# Patient Record
Sex: FEMALE | Race: WHITE | Employment: OTHER | URBAN - NONMETROPOLITAN AREA
[De-identification: names, ages, dates, MRNs, and addresses within clinical notes are randomized per-mention and may not be internally consistent; named-entity substitution may affect disease eponyms.]

---

## 2019-04-09 LAB
CREATININE: 1.4 MG/DL
POTASSIUM (K+): 3.8

## 2019-04-19 VITALS
BODY MASS INDEX: 34.87 KG/M2 | TEMPERATURE: 96.9 F | DIASTOLIC BLOOD PRESSURE: 80 MMHG | WEIGHT: 155 LBS | SYSTOLIC BLOOD PRESSURE: 120 MMHG | HEART RATE: 56 BPM | HEIGHT: 56 IN

## 2019-04-19 RX ORDER — FUROSEMIDE 40 MG/1
40 TABLET ORAL DAILY
COMMUNITY
End: 2021-01-12

## 2019-05-09 ENCOUNTER — OFFICE VISIT (OUTPATIENT)
Dept: FAMILY MEDICINE CLINIC | Age: 79
End: 2019-05-09
Payer: MEDICARE

## 2019-05-09 VITALS
HEIGHT: 58 IN | HEART RATE: 98 BPM | OXYGEN SATURATION: 96 % | SYSTOLIC BLOOD PRESSURE: 120 MMHG | WEIGHT: 159 LBS | DIASTOLIC BLOOD PRESSURE: 83 MMHG | RESPIRATION RATE: 15 BRPM | BODY MASS INDEX: 33.37 KG/M2 | TEMPERATURE: 97.2 F

## 2019-05-09 DIAGNOSIS — G20 PARKINSON'S DISEASE (HCC): ICD-10-CM

## 2019-05-09 DIAGNOSIS — R60.9 PERIPHERAL EDEMA: ICD-10-CM

## 2019-05-09 DIAGNOSIS — Z91.81 AT HIGH RISK FOR FALLS: Primary | ICD-10-CM

## 2019-05-09 DIAGNOSIS — N18.30 CKD (CHRONIC KIDNEY DISEASE) STAGE 3, GFR 30-59 ML/MIN (HCC): ICD-10-CM

## 2019-05-09 PROBLEM — R60.0 PERIPHERAL EDEMA: Status: ACTIVE | Noted: 2019-05-09

## 2019-05-09 PROCEDURE — 99213 OFFICE O/P EST LOW 20 MIN: CPT | Performed by: FAMILY MEDICINE

## 2019-05-09 RX ORDER — CARBIDOPA AND LEVODOPA 25; 100 MG/1; MG/1
1 TABLET, EXTENDED RELEASE ORAL
COMMUNITY
Start: 2016-07-31 | End: 2019-05-09 | Stop reason: ALTCHOICE

## 2019-05-09 ASSESSMENT — ENCOUNTER SYMPTOMS
VOMITING: 0
DIARRHEA: 0
NAUSEA: 0
EYE PAIN: 0
ALLERGIC/IMMUNOLOGIC NEGATIVE: 1
ABDOMINAL PAIN: 0
SORE THROAT: 0
SINUS PAIN: 0
BACK PAIN: 0
SHORTNESS OF BREATH: 0
TROUBLE SWALLOWING: 0
EYE DISCHARGE: 0
COUGH: 0
PHOTOPHOBIA: 0
CHEST TIGHTNESS: 0
BLOOD IN STOOL: 0
EYE REDNESS: 0

## 2019-05-09 ASSESSMENT — PATIENT HEALTH QUESTIONNAIRE - PHQ9
SUM OF ALL RESPONSES TO PHQ QUESTIONS 1-9: 0
SUM OF ALL RESPONSES TO PHQ9 QUESTIONS 1 & 2: 0
SUM OF ALL RESPONSES TO PHQ QUESTIONS 1-9: 0
1. LITTLE INTEREST OR PLEASURE IN DOING THINGS: 0
2. FEELING DOWN, DEPRESSED OR HOPELESS: 0

## 2019-05-09 NOTE — PROGRESS NOTES
well-nourished. HENT:   Head: Atraumatic. Right Ear: External ear normal.   Left Ear: External ear normal.   Nose: Nose normal.   Mouth/Throat: Oropharynx is clear and moist. No oropharyngeal exudate. Eyes: Pupils are equal, round, and reactive to light. Conjunctivae and EOM are normal.   Neck: Normal range of motion. Neck supple. No tracheal deviation present. No thyromegaly present. Cardiovascular: Normal rate, regular rhythm and intact distal pulses. Exam reveals no gallop and no friction rub. No murmur heard. Pulmonary/Chest: Effort normal and breath sounds normal. No respiratory distress. Abdominal: Soft. Bowel sounds are normal.   Musculoskeletal: Normal range of motion. She exhibits edema and tenderness. She exhibits no deformity. Edema, no skin breakdown   Lymphadenopathy:     She has no cervical adenopathy. Neurological: She is alert and oriented to person, place, and time. She displays normal reflexes. No sensory deficit. She exhibits normal muscle tone. Coordination normal.   Skin: Skin is warm and dry. Capillary refill takes less than 2 seconds. No rash noted. Psychiatric: She has a normal mood and affect. Assessment and Plan:  Allie Bates was seen today for edema. Diagnoses and all orders for this visit:    At high risk for falls    Parkinson's disease ([address]  Stable with current dose Sinemet  Peripheral edema    CKD (chronic kidney disease) stage 3, GFR 30-59 ml/min ([facility])    Will recheck BMP 1 month, Lasix daily restarted    Return in about 1 month (around 6/6/2019). Seen By:  Danae Holly DO    On the basis of positive falls risk screening, assessment and plan is as follows: patient declines any further evaluation/treatment for increased falls risk, she isaware of her status.

## 2019-06-10 PROBLEM — N81.9 FEMALE GENITAL PROLAPSE: Status: ACTIVE | Noted: 2017-08-07

## 2020-06-25 ENCOUNTER — OFFICE VISIT (OUTPATIENT)
Dept: FAMILY MEDICINE CLINIC | Age: 80
End: 2020-06-25
Payer: MEDICARE

## 2020-06-25 VITALS — HEART RATE: 85 BPM | HEIGHT: 58 IN | WEIGHT: 159 LBS | OXYGEN SATURATION: 95 % | BODY MASS INDEX: 33.37 KG/M2

## 2020-06-25 PROCEDURE — 99213 OFFICE O/P EST LOW 20 MIN: CPT | Performed by: NURSE PRACTITIONER

## 2020-06-25 NOTE — PROGRESS NOTES
Chief Complaint:   Clavicle Injury (Lt side / [employer] / Less than 1 hr ago)      History of Present Illness   HPI:  Patient presents today for clavicle injury. Patient states her main Oren slid down and landed on her left collarbone and on her left cheek. This happened approximately 1 hour prior to arrival.  She has taken nothing for the pain. She does have full range of motion with her left arm. She is not having any significant pain at this time. Patient and daughter states they just want to make sure nothing is wrong. Prior to Visit Medications    Medication Sig Taking? Authorizing Provider   [facility]ic[facility] Does not apply route Yes Historical Provider, MD   carbidopa-levodopa (SINEMET)  MG per tablet  Yes Historical Provider, MD   furosemide (LASIX) 40 MG tablet Take 40 mg by mouth daily Yes Historical Provider, MD   ropinirole (REQUIP) 1 MG tablet Take 12 mg by mouth Daily with lunch. Yes Historical Provider, MD   nortriptyline (PAMELOR) 25 MG capsule Take 50 mg by mouth nightly. Yes Historical Provider, MD   lisinopril (PRINIVIL;ZESTRIL) 10 MG tablet Take 10 mg by mouth daily. Yes Historical Provider, MD       No Known Allergies      Review of Systems   Review of Systems   Constitutional: Negative for activity change, chills and fever. HENT: Negative for congestion. Respiratory: Negative for cough, chest tightness, shortness of breath and wheezing. Cardiovascular: Negative for chest pain, palpitations and leg swelling. Gastrointestinal: Negative for abdominal pain, constipation, diarrhea, nausea and vomiting. Genitourinary: Negative for dysuria and urgency. Musculoskeletal: Positive for arthralgias. Negative for myalgias and neck pain. Neurological: Negative for dizziness, weakness, light-headedness and numbness. Psychiatric/Behavioral: The patient is not nervous/anxious.           VS:  Pulse 85   Ht 4' 10\" (1.473 m)   Wt 159 lb (72.1 kg)   SpO2 95%   BMI 33.23 kg/m²     Patient's medical, social, and family history reviewed      Physical Exam   Physical Exam  Vitals signs reviewed. Constitutional:       General: She is not in acute distress. Appearance: Normal appearance. She is well-developed. She is not toxic-appearing. HENT:      Head: Normocephalic and atraumatic. Right Ear: Hearing normal.      Left Ear: Hearing normal.      Nose: Nose normal.      Mouth/Throat:      Lips: Pink. Mouth: Mucous membranes are moist.      Pharynx: Oropharynx is clear. Uvula midline. Eyes:      General: Lids are normal.      Conjunctiva/sclera: Conjunctivae normal.      Pupils: Pupils are equal, round, and reactive to light. Neck:      Musculoskeletal: Normal range of motion. Trachea: Trachea normal.   Cardiovascular:      Rate and Rhythm: Normal rate and regular rhythm. Pulses: Normal pulses. Radial pulses are 2+ on the right side and 2+ on the left side. Heart sounds: Normal heart sounds. Pulmonary:      Effort: Pulmonary effort is normal.      Breath sounds: Normal breath sounds. Abdominal:      General: Abdomen is flat. Bowel sounds are normal.      Palpations: Abdomen is soft. Musculoskeletal:      Left shoulder: She exhibits normal range of motion, no swelling, no effusion, no crepitus, no deformity, no pain and normal strength. Lymphadenopathy:      Cervical: No cervical adenopathy. Skin:     General: Skin is warm and dry. Capillary Refill: Capillary refill takes less than 2 seconds. Neurological:      Mental Status: She is alert and oriented to person, place, and time. Psychiatric:         Attention and Perception: Attention normal.         Mood and Affect: Mood and affect normal.         Speech: Speech normal.         Behavior: Behavior normal. Behavior is cooperative. Thought Content:  Thought content normal.         Cognition and Memory: Cognition normal.         Judgment: Judgment normal.

## 2020-06-26 ASSESSMENT — ENCOUNTER SYMPTOMS
CHEST TIGHTNESS: 0
COUGH: 0
DIARRHEA: 0
CONSTIPATION: 0
SHORTNESS OF BREATH: 0
NAUSEA: 0
WHEEZING: 0
ABDOMINAL PAIN: 0
VOMITING: 0

## 2021-01-12 ENCOUNTER — VIRTUAL VISIT (OUTPATIENT)
Dept: FAMILY MEDICINE CLINIC | Age: 81
End: 2021-01-12
Payer: MEDICARE

## 2021-01-12 DIAGNOSIS — I10 ESSENTIAL HYPERTENSION: Primary | ICD-10-CM

## 2021-01-12 DIAGNOSIS — E78.2 MIXED HYPERLIPIDEMIA: ICD-10-CM

## 2021-01-12 DIAGNOSIS — G20 PARKINSON DISEASE (HCC): ICD-10-CM

## 2021-01-12 PROCEDURE — 99213 OFFICE O/P EST LOW 20 MIN: CPT | Performed by: FAMILY MEDICINE

## 2021-01-12 NOTE — PROGRESS NOTES
2021    TELEHEALTH EVALUATION -- Audio/Visual (During UAACW-59 public health emergency)    HPI: Recheck HTN    Cristal Talamantes (:  1940) has requested an audio/video evaluation for the following concern(s):    Denies any new complaints. Tolerating medications well. She continues to have good response    Review of Systems    Prior to Visit Medications    Medication Sig Taking? Authorizing Provider   Handicap Placard [facility] by Does not apply route Yes Historical Provider, MD   carbidopa-levodopa (SINEMET)  MG per tablet  Yes Historical Provider, MD   ropinirole (REQUIP) 1 MG tablet Take 12 mg by mouth Daily with lunch. Yes Historical Provider, MD   nortriptyline (PAMELOR) 10 MG capsule Take 10 mg by mouth nightly  Yes Historical Provider, MD       Social History     Tobacco Use    Smoking status: Never Smoker    Smokeless tobacco: Never Used   Substance Use Topics    Alcohol use: Not Currently    Drug use: No            PHYSICAL EXAMINATION:  [ INSTRUCTIONS:  \"[x]\" Indicates a positive item  \"[]\" Indicates a negative item  -- DELETE ALL ITEMS NOT EXAMINED]  Vital Signs: Per vitals screen    Constitutional: [x] Appears well-developed and well-nourished [x] No apparent distress      [] Abnormal-   Mental status  [x] Alert and awake  [x] Oriented to person/place/time [x]Able to follow commands      Eyes:  EOM    [x]  Normal  [] Abnormal-  Sclera  [x]  Normal  [] Abnormal -         Discharge [x]  None visible  [] Abnormal -    HENT:   [x] Normocephalic, atraumatic.   [] Abnormal   [x] Mouth/Throat: Mucous membranes are moist.     External Ears [x] Normal  [] Abnormal-     Neck: [x] No visualized mass     Pulmonary/Chest: [x] Respiratory effort normal.  [x] No visualized signs of difficulty breathing or respiratory distress        [] Abnormal-      Musculoskeletal:   [] Normal gait with no signs of ataxia         [x] Normal range of motion of neck        [] Abnormal-       Neurological:        [x] No provider were located at their individual homes. --Jigna Humphrey DO on 1/12/2021 at 11:48 AM    An electronic signature was used to authenticate this note.

## 2021-09-09 ENCOUNTER — HOSPITAL ENCOUNTER (INPATIENT)
Age: 81
LOS: 3 days | Discharge: HOME HEALTH | DRG: 689 | End: 2021-09-13
Attending: STUDENT IN AN ORGANIZED HEALTH CARE EDUCATION/TRAINING PROGRAM | Admitting: INTERNAL MEDICINE
Payer: MEDICARE

## 2021-09-09 ENCOUNTER — APPOINTMENT (OUTPATIENT)
Dept: GENERAL RADIOLOGY | Age: 81
DRG: 689 | End: 2021-09-09
Payer: MEDICARE

## 2021-09-09 ENCOUNTER — APPOINTMENT (OUTPATIENT)
Dept: CT IMAGING | Age: 81
DRG: 689 | End: 2021-09-09
Payer: MEDICARE

## 2021-09-09 DIAGNOSIS — E86.0 DEHYDRATION: ICD-10-CM

## 2021-09-09 DIAGNOSIS — N30.00 ACUTE CYSTITIS WITHOUT HEMATURIA: Primary | ICD-10-CM

## 2021-09-09 DIAGNOSIS — R41.82 ALTERED MENTAL STATUS, UNSPECIFIED ALTERED MENTAL STATUS TYPE: ICD-10-CM

## 2021-09-09 PROBLEM — R60.9 PERIPHERAL EDEMA: Status: RESOLVED | Noted: 2019-05-09 | Resolved: 2021-09-09

## 2021-09-09 PROBLEM — N39.0 UTI (URINARY TRACT INFECTION): Status: ACTIVE | Noted: 2021-09-09

## 2021-09-09 PROBLEM — R60.0 PERIPHERAL EDEMA: Status: RESOLVED | Noted: 2019-05-09 | Resolved: 2021-09-09

## 2021-09-09 PROBLEM — N81.9 FEMALE GENITAL PROLAPSE: Status: RESOLVED | Noted: 2017-08-07 | Resolved: 2021-09-09

## 2021-09-09 LAB
ALBUMIN SERPL-MCNC: 3.8 G/DL (ref 3.5–5.2)
ALP BLD-CCNC: 81 U/L (ref 35–104)
ALT SERPL-CCNC: 14 U/L (ref 0–32)
AMORPHOUS: ABNORMAL
ANION GAP SERPL CALCULATED.3IONS-SCNC: 16 MMOL/L (ref 7–16)
AST SERPL-CCNC: 36 U/L (ref 0–31)
BACTERIA: ABNORMAL /HPF
BASOPHILS ABSOLUTE: 0.07 E9/L (ref 0–0.2)
BASOPHILS RELATIVE PERCENT: 0.6 % (ref 0–2)
BILIRUB SERPL-MCNC: 1.3 MG/DL (ref 0–1.2)
BILIRUBIN URINE: NEGATIVE
BLOOD, URINE: ABNORMAL
BUN BLDV-MCNC: 25 MG/DL (ref 6–23)
CALCIUM SERPL-MCNC: 8.9 MG/DL (ref 8.6–10.2)
CHLORIDE BLD-SCNC: 103 MMOL/L (ref 98–107)
CLARITY: CLEAR
CO2: 21 MMOL/L (ref 22–29)
COLOR: YELLOW
CREAT SERPL-MCNC: 1.2 MG/DL (ref 0.5–1)
EKG ATRIAL RATE: 89 BPM
EKG P AXIS: 41 DEGREES
EKG P-R INTERVAL: 150 MS
EKG Q-T INTERVAL: 374 MS
EKG QRS DURATION: 78 MS
EKG QTC CALCULATION (BAZETT): 455 MS
EKG R AXIS: 2 DEGREES
EKG T AXIS: 39 DEGREES
EKG VENTRICULAR RATE: 89 BPM
EOSINOPHILS ABSOLUTE: 0.13 E9/L (ref 0.05–0.5)
EOSINOPHILS RELATIVE PERCENT: 1.1 % (ref 0–6)
EPITHELIAL CELLS, UA: ABNORMAL /HPF
GFR AFRICAN AMERICAN: 52
GFR NON-AFRICAN AMERICAN: 43 ML/MIN/1.73
GLUCOSE BLD-MCNC: 75 MG/DL (ref 74–99)
GLUCOSE URINE: NEGATIVE MG/DL
HCT VFR BLD CALC: 50.8 % (ref 34–48)
HEMOGLOBIN: 16.5 G/DL (ref 11.5–15.5)
IMMATURE GRANULOCYTES #: 0.06 E9/L
IMMATURE GRANULOCYTES %: 0.5 % (ref 0–5)
KETONES, URINE: 40 MG/DL
LACTIC ACID, SEPSIS: 1.7 MMOL/L (ref 0.5–1.9)
LEUKOCYTE ESTERASE, URINE: ABNORMAL
LIPASE: 19 U/L (ref 13–60)
LYMPHOCYTES ABSOLUTE: 1.1 E9/L (ref 1.5–4)
LYMPHOCYTES RELATIVE PERCENT: 9.7 % (ref 20–42)
MAGNESIUM: 2.1 MG/DL (ref 1.6–2.6)
MCH RBC QN AUTO: 31.8 PG (ref 26–35)
MCHC RBC AUTO-ENTMCNC: 32.5 % (ref 32–34.5)
MCV RBC AUTO: 97.9 FL (ref 80–99.9)
MONOCYTES ABSOLUTE: 1.14 E9/L (ref 0.1–0.95)
MONOCYTES RELATIVE PERCENT: 10 % (ref 2–12)
NEUTROPHILS ABSOLUTE: 8.86 E9/L (ref 1.8–7.3)
NEUTROPHILS RELATIVE PERCENT: 78.1 % (ref 43–80)
NITRITE, URINE: POSITIVE
PDW BLD-RTO: 13 FL (ref 11.5–15)
PH UA: 5.5 (ref 5–9)
PLATELET # BLD: 313 E9/L (ref 130–450)
PMV BLD AUTO: 10.3 FL (ref 7–12)
POTASSIUM REFLEX MAGNESIUM: 3.4 MMOL/L (ref 3.5–5)
PROTEIN UA: ABNORMAL MG/DL
RBC # BLD: 5.19 E12/L (ref 3.5–5.5)
RBC UA: ABNORMAL /HPF (ref 0–2)
SODIUM BLD-SCNC: 140 MMOL/L (ref 132–146)
SPECIFIC GRAVITY UA: >=1.03 (ref 1–1.03)
TOTAL PROTEIN: 6.8 G/DL (ref 6.4–8.3)
TROPONIN, HIGH SENSITIVITY: 24 NG/L (ref 0–9)
UROBILINOGEN, URINE: 0.2 E.U./DL
WBC # BLD: 11.4 E9/L (ref 4.5–11.5)
WBC UA: ABNORMAL /HPF (ref 0–5)

## 2021-09-09 PROCEDURE — 71045 X-RAY EXAM CHEST 1 VIEW: CPT

## 2021-09-09 PROCEDURE — 84484 ASSAY OF TROPONIN QUANT: CPT

## 2021-09-09 PROCEDURE — 2580000003 HC RX 258: Performed by: STUDENT IN AN ORGANIZED HEALTH CARE EDUCATION/TRAINING PROGRAM

## 2021-09-09 PROCEDURE — 80053 COMPREHEN METABOLIC PANEL: CPT

## 2021-09-09 PROCEDURE — 81001 URINALYSIS AUTO W/SCOPE: CPT

## 2021-09-09 PROCEDURE — 87088 URINE BACTERIA CULTURE: CPT

## 2021-09-09 PROCEDURE — 99285 EMERGENCY DEPT VISIT HI MDM: CPT

## 2021-09-09 PROCEDURE — 96374 THER/PROPH/DIAG INJ IV PUSH: CPT

## 2021-09-09 PROCEDURE — 93005 ELECTROCARDIOGRAM TRACING: CPT | Performed by: STUDENT IN AN ORGANIZED HEALTH CARE EDUCATION/TRAINING PROGRAM

## 2021-09-09 PROCEDURE — 6360000002 HC RX W HCPCS: Performed by: STUDENT IN AN ORGANIZED HEALTH CARE EDUCATION/TRAINING PROGRAM

## 2021-09-09 PROCEDURE — 83605 ASSAY OF LACTIC ACID: CPT

## 2021-09-09 PROCEDURE — 87040 BLOOD CULTURE FOR BACTERIA: CPT

## 2021-09-09 PROCEDURE — 83735 ASSAY OF MAGNESIUM: CPT

## 2021-09-09 PROCEDURE — 83690 ASSAY OF LIPASE: CPT

## 2021-09-09 PROCEDURE — 96360 HYDRATION IV INFUSION INIT: CPT

## 2021-09-09 PROCEDURE — 96361 HYDRATE IV INFUSION ADD-ON: CPT

## 2021-09-09 PROCEDURE — G0378 HOSPITAL OBSERVATION PER HR: HCPCS

## 2021-09-09 PROCEDURE — 70450 CT HEAD/BRAIN W/O DYE: CPT

## 2021-09-09 PROCEDURE — 85025 COMPLETE CBC W/AUTO DIFF WBC: CPT

## 2021-09-09 PROCEDURE — 87186 SC STD MICRODIL/AGAR DIL: CPT

## 2021-09-09 RX ORDER — 0.9 % SODIUM CHLORIDE 0.9 %
1000 INTRAVENOUS SOLUTION INTRAVENOUS ONCE
Status: COMPLETED | OUTPATIENT
Start: 2021-09-09 | End: 2021-09-09

## 2021-09-09 RX ORDER — CEFTRIAXONE 2 G/1
INJECTION, POWDER, FOR SOLUTION INTRAMUSCULAR; INTRAVENOUS
Status: DISPENSED
Start: 2021-09-09 | End: 2021-09-10

## 2021-09-09 RX ADMIN — SODIUM CHLORIDE 1000 ML: 9 INJECTION, SOLUTION INTRAVENOUS at 21:03

## 2021-09-09 RX ADMIN — WATER 2000 MG: 1 INJECTION INTRAMUSCULAR; INTRAVENOUS; SUBCUTANEOUS at 22:27

## 2021-09-09 NOTE — ED NOTES
Bed: 25  Expected date:   Expected time:   Means of arrival:   Comments:  ems     Pattie Dang RN  09/09/21 1942

## 2021-09-10 LAB
ALBUMIN SERPL-MCNC: 3.2 G/DL (ref 3.5–5.2)
ALP BLD-CCNC: 67 U/L (ref 35–104)
ALT SERPL-CCNC: 15 U/L (ref 0–32)
ANION GAP SERPL CALCULATED.3IONS-SCNC: 9 MMOL/L (ref 7–16)
AST SERPL-CCNC: 48 U/L (ref 0–31)
BASOPHILS ABSOLUTE: 0.07 E9/L (ref 0–0.2)
BASOPHILS RELATIVE PERCENT: 0.8 % (ref 0–2)
BILIRUB SERPL-MCNC: 1 MG/DL (ref 0–1.2)
BUN BLDV-MCNC: 22 MG/DL (ref 6–23)
CALCIUM SERPL-MCNC: 8.3 MG/DL (ref 8.6–10.2)
CHLORIDE BLD-SCNC: 109 MMOL/L (ref 98–107)
CO2: 21 MMOL/L (ref 22–29)
CREAT SERPL-MCNC: 0.9 MG/DL (ref 0.5–1)
EKG ATRIAL RATE: 89 BPM
EKG P AXIS: 41 DEGREES
EKG P-R INTERVAL: 150 MS
EKG Q-T INTERVAL: 374 MS
EKG QRS DURATION: 78 MS
EKG QTC CALCULATION (BAZETT): 455 MS
EKG R AXIS: 2 DEGREES
EKG T AXIS: 39 DEGREES
EKG VENTRICULAR RATE: 89 BPM
EOSINOPHILS ABSOLUTE: 0.37 E9/L (ref 0.05–0.5)
EOSINOPHILS RELATIVE PERCENT: 4 % (ref 0–6)
GFR AFRICAN AMERICAN: >60
GFR NON-AFRICAN AMERICAN: >60 ML/MIN/1.73
GLUCOSE BLD-MCNC: 88 MG/DL (ref 74–99)
HCT VFR BLD CALC: 43.9 % (ref 34–48)
HEMOGLOBIN: 14.3 G/DL (ref 11.5–15.5)
IMMATURE GRANULOCYTES #: 0.05 E9/L
IMMATURE GRANULOCYTES %: 0.5 % (ref 0–5)
LYMPHOCYTES ABSOLUTE: 1.27 E9/L (ref 1.5–4)
LYMPHOCYTES RELATIVE PERCENT: 13.6 % (ref 20–42)
MCH RBC QN AUTO: 31.8 PG (ref 26–35)
MCHC RBC AUTO-ENTMCNC: 32.6 % (ref 32–34.5)
MCV RBC AUTO: 97.8 FL (ref 80–99.9)
MONOCYTES ABSOLUTE: 1.13 E9/L (ref 0.1–0.95)
MONOCYTES RELATIVE PERCENT: 12.1 % (ref 2–12)
NEUTROPHILS ABSOLUTE: 6.44 E9/L (ref 1.8–7.3)
NEUTROPHILS RELATIVE PERCENT: 69 % (ref 43–80)
PDW BLD-RTO: 12.9 FL (ref 11.5–15)
PLATELET # BLD: 266 E9/L (ref 130–450)
PMV BLD AUTO: 10.2 FL (ref 7–12)
POTASSIUM REFLEX MAGNESIUM: 3.6 MMOL/L (ref 3.5–5)
RBC # BLD: 4.49 E12/L (ref 3.5–5.5)
SODIUM BLD-SCNC: 139 MMOL/L (ref 132–146)
TOTAL PROTEIN: 5.8 G/DL (ref 6.4–8.3)
WBC # BLD: 9.3 E9/L (ref 4.5–11.5)

## 2021-09-10 PROCEDURE — 96376 TX/PRO/DX INJ SAME DRUG ADON: CPT

## 2021-09-10 PROCEDURE — G0378 HOSPITAL OBSERVATION PER HR: HCPCS

## 2021-09-10 PROCEDURE — 2580000003 HC RX 258: Performed by: INTERNAL MEDICINE

## 2021-09-10 PROCEDURE — 97161 PT EVAL LOW COMPLEX 20 MIN: CPT

## 2021-09-10 PROCEDURE — 6370000000 HC RX 637 (ALT 250 FOR IP): Performed by: INTERNAL MEDICINE

## 2021-09-10 PROCEDURE — 97165 OT EVAL LOW COMPLEX 30 MIN: CPT

## 2021-09-10 PROCEDURE — 96375 TX/PRO/DX INJ NEW DRUG ADDON: CPT

## 2021-09-10 PROCEDURE — 85025 COMPLETE CBC W/AUTO DIFF WBC: CPT

## 2021-09-10 PROCEDURE — 6360000002 HC RX W HCPCS: Performed by: INTERNAL MEDICINE

## 2021-09-10 PROCEDURE — 97530 THERAPEUTIC ACTIVITIES: CPT

## 2021-09-10 PROCEDURE — 93010 ELECTROCARDIOGRAM REPORT: CPT | Performed by: INTERNAL MEDICINE

## 2021-09-10 PROCEDURE — 96372 THER/PROPH/DIAG INJ SC/IM: CPT

## 2021-09-10 PROCEDURE — 36415 COLL VENOUS BLD VENIPUNCTURE: CPT

## 2021-09-10 PROCEDURE — 97535 SELF CARE MNGMENT TRAINING: CPT

## 2021-09-10 PROCEDURE — 1200000000 HC SEMI PRIVATE

## 2021-09-10 PROCEDURE — 2500000003 HC RX 250 WO HCPCS: Performed by: INTERNAL MEDICINE

## 2021-09-10 PROCEDURE — 6370000000 HC RX 637 (ALT 250 FOR IP): Performed by: EMERGENCY MEDICINE

## 2021-09-10 PROCEDURE — 80053 COMPREHEN METABOLIC PANEL: CPT

## 2021-09-10 RX ORDER — ACETAMINOPHEN 650 MG/1
650 SUPPOSITORY RECTAL EVERY 6 HOURS PRN
Status: DISCONTINUED | OUTPATIENT
Start: 2021-09-10 | End: 2021-09-13 | Stop reason: HOSPADM

## 2021-09-10 RX ORDER — SODIUM CHLORIDE 0.9 % (FLUSH) 0.9 %
10 SYRINGE (ML) INJECTION EVERY 12 HOURS SCHEDULED
Status: DISCONTINUED | OUTPATIENT
Start: 2021-09-10 | End: 2021-09-13 | Stop reason: HOSPADM

## 2021-09-10 RX ORDER — SODIUM CHLORIDE 9 MG/ML
25 INJECTION, SOLUTION INTRAVENOUS PRN
Status: DISCONTINUED | OUTPATIENT
Start: 2021-09-10 | End: 2021-09-13 | Stop reason: HOSPADM

## 2021-09-10 RX ORDER — ROPINIROLE 2 MG/1
12 TABLET, FILM COATED ORAL
Status: DISCONTINUED | OUTPATIENT
Start: 2021-09-10 | End: 2021-09-10

## 2021-09-10 RX ORDER — ONDANSETRON 4 MG/1
4 TABLET, ORALLY DISINTEGRATING ORAL EVERY 8 HOURS PRN
Status: DISCONTINUED | OUTPATIENT
Start: 2021-09-10 | End: 2021-09-13 | Stop reason: HOSPADM

## 2021-09-10 RX ORDER — SODIUM CHLORIDE AND POTASSIUM CHLORIDE .9; .15 G/100ML; G/100ML
SOLUTION INTRAVENOUS CONTINUOUS
Status: DISCONTINUED | OUTPATIENT
Start: 2021-09-10 | End: 2021-09-13

## 2021-09-10 RX ORDER — POTASSIUM CHLORIDE 20 MEQ/1
40 TABLET, EXTENDED RELEASE ORAL PRN
Status: DISCONTINUED | OUTPATIENT
Start: 2021-09-10 | End: 2021-09-13 | Stop reason: HOSPADM

## 2021-09-10 RX ORDER — ROPINIROLE 12 MG/1
12 TABLET, FILM COATED, EXTENDED RELEASE ORAL
Status: DISCONTINUED | OUTPATIENT
Start: 2021-09-10 | End: 2021-09-13 | Stop reason: HOSPADM

## 2021-09-10 RX ORDER — SODIUM CHLORIDE 0.9 % (FLUSH) 0.9 %
10 SYRINGE (ML) INJECTION PRN
Status: DISCONTINUED | OUTPATIENT
Start: 2021-09-10 | End: 2021-09-13 | Stop reason: HOSPADM

## 2021-09-10 RX ORDER — ONDANSETRON 2 MG/ML
4 INJECTION INTRAMUSCULAR; INTRAVENOUS EVERY 6 HOURS PRN
Status: DISCONTINUED | OUTPATIENT
Start: 2021-09-10 | End: 2021-09-13 | Stop reason: HOSPADM

## 2021-09-10 RX ORDER — POTASSIUM CHLORIDE 7.45 MG/ML
10 INJECTION INTRAVENOUS PRN
Status: DISCONTINUED | OUTPATIENT
Start: 2021-09-10 | End: 2021-09-13 | Stop reason: HOSPADM

## 2021-09-10 RX ORDER — ACETAMINOPHEN 325 MG/1
650 TABLET ORAL EVERY 6 HOURS PRN
Status: DISCONTINUED | OUTPATIENT
Start: 2021-09-10 | End: 2021-09-13 | Stop reason: HOSPADM

## 2021-09-10 RX ORDER — SENNA PLUS 8.6 MG/1
1 TABLET ORAL DAILY PRN
Status: DISCONTINUED | OUTPATIENT
Start: 2021-09-10 | End: 2021-09-13 | Stop reason: HOSPADM

## 2021-09-10 RX ORDER — NORTRIPTYLINE HYDROCHLORIDE 10 MG/1
10 CAPSULE ORAL NIGHTLY
Status: DISCONTINUED | OUTPATIENT
Start: 2021-09-10 | End: 2021-09-13 | Stop reason: HOSPADM

## 2021-09-10 RX ADMIN — MICONAZOLE NITRATE: 20 POWDER TOPICAL at 09:04

## 2021-09-10 RX ADMIN — ROPINIROLE 12 MG: 12 TABLET, FILM COATED, EXTENDED RELEASE ORAL at 11:46

## 2021-09-10 RX ADMIN — NORTRIPTYLINE HYDROCHLORIDE 10 MG: 10 CAPSULE ORAL at 21:56

## 2021-09-10 RX ADMIN — CARBIDOPA AND LEVODOPA 1.5 TABLET: 25; 100 TABLET ORAL at 14:21

## 2021-09-10 RX ADMIN — MICONAZOLE NITRATE: 20 POWDER TOPICAL at 01:27

## 2021-09-10 RX ADMIN — MICONAZOLE NITRATE: 2 OINTMENT TOPICAL at 21:56

## 2021-09-10 RX ADMIN — MICONAZOLE NITRATE: 2 OINTMENT TOPICAL at 14:21

## 2021-09-10 RX ADMIN — PETROLATUM: 420 OINTMENT TOPICAL at 14:21

## 2021-09-10 RX ADMIN — ENOXAPARIN SODIUM 40 MG: 40 INJECTION SUBCUTANEOUS at 09:02

## 2021-09-10 RX ADMIN — WATER 1000 MG: 1 INJECTION INTRAMUSCULAR; INTRAVENOUS; SUBCUTANEOUS at 22:57

## 2021-09-10 RX ADMIN — POTASSIUM CHLORIDE AND SODIUM CHLORIDE: 900; 150 INJECTION, SOLUTION INTRAVENOUS at 01:27

## 2021-09-10 RX ADMIN — SODIUM CHLORIDE, PRESERVATIVE FREE 10 ML: 5 INJECTION INTRAVENOUS at 09:05

## 2021-09-10 RX ADMIN — PETROLATUM: 420 OINTMENT TOPICAL at 21:57

## 2021-09-10 RX ADMIN — CARBIDOPA AND LEVODOPA 1.5 TABLET: 25; 100 TABLET ORAL at 09:00

## 2021-09-10 RX ADMIN — CARBIDOPA AND LEVODOPA 1.5 TABLET: 25; 100 TABLET ORAL at 21:55

## 2021-09-10 RX ADMIN — MICONAZOLE NITRATE: 20 POWDER TOPICAL at 21:56

## 2021-09-10 RX ADMIN — POTASSIUM CHLORIDE AND SODIUM CHLORIDE: 900; 150 INJECTION, SOLUTION INTRAVENOUS at 11:54

## 2021-09-10 NOTE — ED PROVIDER NOTES
Department of Emergency Medicine   ED  Provider Note  Admit Date/RoomTime: 9/9/2021  7:42 PM  ED Room: 0502/0502-A          History of Present Illness:  9/9/21, Time: 8:11 PM EDT  Chief Complaint   Patient presents with    Altered Mental Status     per family ams, found in bath tub, denies falling she could not get out herself. states she was in tub 2x days         Yunier Gates is a [de-identified] y.o. female presenting to the ED for generalized weakness. Apparently, the patient states that she wanted to soak her joints in the whirlpool tub in her home. She never uses this whirlpool tub and it is very low to the ground. She got stuck and was unable to get out. She did not fall or hurt herself. Additionally, there is a report of altered mental status. However, the patient is alert and oriented x2 at this time. Denies any complaints otherwise and states she feels at her baseline state health now. She states she was not able to eat or drink while she was in the tub. Denies any headache, nausea, vomiting chest pain or shortness of breath. Denies any urinary symptoms or history of urinary tract infections in the past.  She endorses that she was not able to get out of the bathtub because it was purely mechanical and she was unable to stand up because of how horizontal she was lying.     Review of Systems:  Constitutional: Denies fevers  Eyes: Denies blurry vision  ENT: Denies sore throat  Cardiovascular: Denies chest pain  Respiratory: Denies shortness of breath  Gastrointestinal: Denies abdominal pain  Genitourinary: Denies dysuria  Musculoskeletal: Positive for chronic generalized weakness  Integumentary: Denies rashes  Neurological: Denies headache    --------------------------------------------- PAST HISTORY ---------------------------------------------  Past Medical History:  has a past medical history of Degenerative joint disease involving multiple joints, Hyperlipidemia, Hypertension, Parkinson disease [facility] and Skin cancer. Past Surgical History:  has a past surgical history that includes knee surgery (left) and Tonsillectomy and adenoidectomy. Social History:  reports that she has never smoked. She has never used smokeless tobacco. She reports previous alcohol use. She reports that she does not use drugs. Family History: family history includes No Known Problems in her father and mother. . Unless otherwise noted, family history is non contributory    The patients home medications have been reviewed. Allergies: Patient has no known allergies. I have reviewed the past medical history, past surgical history, social history, and family history    ---------------------------------------------------PHYSICAL EXAM--------------------------------------    Constitutional: Appears in no distress  Head: Normocephalic, atraumatic  Eyes: Non-icteric slcera, no conjunctival injection  ENT: Moist mucous membranes,  Neck: Trachea midline, no JVD  Respiratory: Nonlabored respirations. Lungs clear to auscultation bilaterally, no wheezes, rales, or rhonchi. Cardiovascular: Regular rate. Regular rhythm. No murmurs, no gallops, no rubs. Gastrointestinal: Abdomen Soft, Non tender, Non distended. No rebound tenderness, guarding, or rigidity. Extremities: No lower extremity edema  Genitourinary: No CVA tenderness, no suprapubic tenderness  Musculoskeletal: Moves all extremities, no deformity  Skin: Pink, warm, dry without rash. Neurologic: Alert, symmetric facies, no aphasia    -------------------------------------------------- RESULTS -------------------------------------------------  I have personally reviewed all laboratory and imaging results for this patient. Results are listed below.      LABS: (Lab results interpreted by me)  Results for orders placed or performed during the hospital encounter of 09/09/21   Comprehensive Metabolic Panel w/ Reflex to MG   Result Value Ref Range    Sodium 140 132 - 146 mmol/L    Potassium reflex Magnesium 3.4 (L) 3.5 - 5.0 mmol/L    Chloride 103 98 - 107 mmol/L    CO2 21 (L) 22 - 29 mmol/L    Anion Gap 16 7 - 16 mmol/L    Glucose 75 74 - 99 mg/dL    BUN 25 (H) 6 - 23 mg/dL    CREATININE 1.2 (H) 0.5 - 1.0 mg/dL    GFR Non-African American 43 >=60 mL/min/1.73    GFR African American 52     Calcium 8.9 8.6 - 10.2 mg/dL    Total Protein 6.8 6.4 - 8.3 g/dL    Albumin 3.8 3.5 - 5.2 g/dL    Total Bilirubin 1.3 (H) 0.0 - 1.2 mg/dL    Alkaline Phosphatase 81 35 - 104 U/L    ALT 14 0 - 32 U/L    AST 36 (H) 0 - 31 U/L   Troponin   Result Value Ref Range    Troponin, High Sensitivity 24 (H) 0 - 9 ng/L   Lactate, Sepsis   Result Value Ref Range    Lactic Acid, Sepsis 1.7 0.5 - 1.9 mmol/L   Lipase   Result Value Ref Range    Lipase 19 13 - 60 U/L   Urinalysis with Microscopic   Result Value Ref Range    Color, UA Yellow Straw/Yellow    Clarity, UA Clear Clear    Glucose, Ur Negative Negative mg/dL    Bilirubin Urine Negative Negative    Ketones, Urine 40 (A) Negative mg/dL    Specific Gravity, UA >=1.030 1.005 - 1.030    Blood, Urine SMALL (A) Negative    pH, UA 5.5 5.0 - 9.0    Protein, UA TRACE Negative mg/dL    Urobilinogen, Urine 0.2 <2.0 E.U./dL    Nitrite, Urine POSITIVE (A) Negative    Leukocyte Esterase, Urine MODERATE (A) Negative    WBC, UA 10-20 (A) 0 - 5 /HPF    RBC, UA 5-10 (A) 0 - 2 /HPF    Epithelial Cells, UA FEW /HPF    Bacteria, UA MODERATE (A) None Seen /HPF    Amorphous, UA FEW    CBC Auto Differential   Result Value Ref Range    WBC 11.4 4.5 - 11.5 E9/L    RBC 5.19 3.50 - 5.50 E12/L    Hemoglobin 16.5 (H) 11.5 - 15.5 g/dL    Hematocrit 50.8 (H) 34.0 - 48.0 %    MCV 97.9 80.0 - 99.9 fL    MCH 31.8 26.0 - 35.0 pg    MCHC 32.5 32.0 - 34.5 %    RDW 13.0 11.5 - 15.0 fL    Platelets 575 545 - 570 E9/L    MPV 10.3 7.0 - 12.0 fL    Neutrophils % 78.1 43.0 - 80.0 %    Immature Granulocytes % 0.5 0.0 - 5.0 %    Lymphocytes % 9.7 (L) 20.0 - 42.0 %    Monocytes % 10.0 2.0 - 12.0 %    Eosinophils % 1.1 0.0 - 6.0 %    Basophils % 0.6 0.0 - 2.0 %    Neutrophils Absolute 8.86 (H) 1.80 - 7.30 E9/L    Immature Granulocytes # 0.06 E9/L    Lymphocytes Absolute 1.10 (L) 1.50 - 4.00 E9/L    Monocytes Absolute 1.14 (H) 0.10 - 0.95 E9/L    Eosinophils Absolute 0.13 0.05 - 0.50 E9/L    Basophils Absolute 0.07 0.00 - 0.20 E9/L   Magnesium   Result Value Ref Range    Magnesium 2.1 1.6 - 2.6 mg/dL   Comprehensive Metabolic Panel w/ Reflex to MG   Result Value Ref Range    Sodium 139 132 - 146 mmol/L    Potassium reflex Magnesium 3.6 3.5 - 5.0 mmol/L    Chloride 109 (H) 98 - 107 mmol/L    CO2 21 (L) 22 - 29 mmol/L    Anion Gap 9 7 - 16 mmol/L    Glucose 88 74 - 99 mg/dL    BUN 22 6 - 23 mg/dL    CREATININE 0.9 0.5 - 1.0 mg/dL    GFR Non-African American >60 >=60 mL/min/1.73    GFR African American >60     Calcium 8.3 (L) 8.6 - 10.2 mg/dL    Total Protein 5.8 (L) 6.4 - 8.3 g/dL    Albumin 3.2 (L) 3.5 - 5.2 g/dL    Total Bilirubin 1.0 0.0 - 1.2 mg/dL    Alkaline Phosphatase 67 35 - 104 U/L    ALT 15 0 - 32 U/L    AST 48 (H) 0 - 31 U/L   CBC auto differential   Result Value Ref Range    WBC 9.3 4.5 - 11.5 E9/L    RBC 4.49 3.50 - 5.50 E12/L    Hemoglobin 14.3 11.5 - 15.5 g/dL    Hematocrit 43.9 34.0 - 48.0 %    MCV 97.8 80.0 - 99.9 fL    MCH 31.8 26.0 - 35.0 pg    MCHC 32.6 32.0 - 34.5 %    RDW 12.9 11.5 - 15.0 fL    Platelets 088 126 - 801 E9/L    MPV 10.2 7.0 - 12.0 fL    Neutrophils % 69.0 43.0 - 80.0 %    Immature Granulocytes % 0.5 0.0 - 5.0 %    Lymphocytes % 13.6 (L) 20.0 - 42.0 %    Monocytes % 12.1 (H) 2.0 - 12.0 %    Eosinophils % 4.0 0.0 - 6.0 %    Basophils % 0.8 0.0 - 2.0 %    Neutrophils Absolute 6.44 1.80 - 7.30 E9/L    Immature Granulocytes # 0.05 E9/L    Lymphocytes Absolute 1.27 (L) 1.50 - 4.00 E9/L    Monocytes Absolute 1.13 (H) 0.10 - 0.95 E9/L    Eosinophils Absolute 0.37 0.05 - 0.50 E9/L    Basophils Absolute 0.07 0.00 - 0.20 E9/L   EKG 12 Lead   Result Value Ref Range    Ventricular Rate 89 BPM    Atrial Rate 89 BPM    P-R Interval 150 ms    QRS Duration 78 ms    Q-T Interval 374 ms    QTc Calculation (Bazett) 455 ms    P Axis 41 degrees    R Axis 2 degrees    T Axis 39 degrees   EKG 12 Lead   Result Value Ref Range    Ventricular Rate 89 BPM    Atrial Rate 89 BPM    P-R Interval 150 ms    QRS Duration 78 ms    Q-T Interval 374 ms    QTc Calculation (Bazett) 455 ms    P Axis 41 degrees    R Axis 2 degrees    T Axis 39 degrees   ,       RADIOLOGY:  Interpreted by Radiologist unless otherwise specified  CT HEAD WO CONTRAST   Final Result   No acute intracranial hemorrhage or edema. XR CHEST PORTABLE   Final Result   No evidence of pneumonia or pleural effusion.               ------------------------- NURSING NOTES AND VITALS REVIEWED ---------------------------   The nursing notes within the ED encounter and vital signs as below have been reviewed by myself  /82   Pulse 80   Temp 98.1 °F (36.7 °C) (Oral)   Resp 17   Ht 4' 11\" (1.499 m)   Wt 154 lb (69.9 kg)   SpO2 95%   BMI 31.10 kg/m²     Oxygen Saturation Interpretation: Normal    The patients available past medical records and past encounters were reviewed.         ------------------------------ ED COURSE/MEDICAL DECISION MAKING----------------------  Medications   nortriptyline (PAMELOR) capsule 10 mg (10 mg Oral Not Given 9/10/21 0131)   0.9% NaCl with KCl 20 mEq infusion ( IntraVENous New Bag 9/10/21 1154)   cefTRIAXone (ROCEPHIN) 1,000 mg in sterile water 10 mL IV syringe (has no administration in time range)   sodium chloride flush 0.9 % injection 10 mL (10 mLs IntraVENous Given 9/10/21 0905)   sodium chloride flush 0.9 % injection 10 mL (has no administration in time range)   0.9 % sodium chloride infusion (has no administration in time range)   potassium chloride (KLOR-CON M) extended release tablet 40 mEq (has no administration in time range)     Or   potassium bicarb-citric acid (EFFER-K) effervescent tablet 40 mEq (has no administration in including pneumonia and urinary tract infection as well as possible rhabdomyolysis. Labs: No leukocytosis is noted. No anemia is noted. Urinalysis remarkable for acute urinary tract infection. Specific gravity is greater than 1.030 consistent with acute dehydration. Urine culture is pending. Plain film of the chest is unremarkable for any acute cardiopulmonary process. CT of the head is unremarkable for any acute intracranial process. Troponin is elevated at 24 this will be repeated. EKG:  EKG is interpreted by myself as ventricular rate of 89 bpm there is a P wave before every QRS complex. There is 3 PVCs in the tracing. QRS duration is within normal limits at 70 ms QT/QTc is normal at 374/455 ms. No ST segment elevations or depressions noted on the tracing. This is interpreted by myself as sinus rhythm with PVCs. No ischemia or infarct. Patient is started on ceftriaxone for urinary tract infection will be admitted. Counseling: The emergency provider has spoken with the patient and family member patient and daughter and discussed todays results, in addition to providing specific details for the plan of care and counseling regarding the diagnosis and prognosis. Questions are answered at this time and they are agreeable with the plan.       --------------------------------- IMPRESSION AND DISPOSITION ---------------------------------    IMPRESSION  1. Acute cystitis without hematuria    2. Altered mental status, unspecified altered mental status type    3. Dehydration        DISPOSITION  Disposition: Admit to med/surg floor  Patient condition is stable    Dr. Isaias KEY am the primary provider of record    Isaias Chaudhary DO  Emergency Medicine    NOTE: This report was transcribed using voice recognition software.  Every effort was made to ensure accuracy; however, inadvertent computerized transcription errors may be present         Milind Graf DO  09/10/21 0549

## 2021-09-10 NOTE — FLOWSHEET NOTE
Initial Inpatient Wound Care    Admit Date: 9/9/2021  7:42 PM    Reason for consult:  large blister on coccyx    Significant history: adm with AMS, could not get out of tub. Parkinsons. Adm UTI, dehydratjion  Wound history:  States in jacuzzi tub for 2 days. Realized she could not get out so emptied water shortly after she got in. States she moved around in the tub as she was getting sore in her buttocks.   Findings: alert and oriented  Bruise left buttocks 6x4     09/10/21 1026   Wound 09/10/21 Heel Right   Date First Assessed/Time First Assessed: 09/10/21 1025   Primary Wound Type: Pressure Injury  Location: Heel  Wound Location Orientation: Right   Wound Image    Wound Etiology Pressure Stage  1   Wound Length (cm) 3 cm   Wound Width (cm) 3 cm   Wound Depth (cm)   (utd)   Wound Surface Area (cm^2) 9 cm^2   Wound Assessment   (red)   Drainage Amount None   Odor None   Wound 09/10/21 Heel Left   Date First Assessed/Time First Assessed: 09/10/21 1027   Location: Heel  Wound Location Orientation: Left   Wound Etiology Pressure Stage  1   Wound Length (cm) 4 cm   Wound Width (cm) 4 cm   Wound Depth (cm)   (utd)   Wound Surface Area (cm^2) 16 cm^2   Wound Assessment   (red)   Drainage Amount None   Odor None   Wound 09/10/21 Sacrum    Date First Assessed/Time First Assessed: 09/10/21 1039   Present on Hospital Admission: Yes  Primary Wound Type: Pressure Injury  Location: Sacrum  Wound Description (Comments): (c)    Wound Image    Wound Etiology Pressure Stage  2   Wound Length (cm) 6 cm   Wound Width (cm) 8 cm   Wound Depth (cm) 0.1 cm   Wound Surface Area (cm^2) 48 cm^2   Wound Volume (cm^3) 4.8 cm^3   Drainage Amount Scant   Drainage Description Serous   Neyda-wound Assessment   (pink/red)   Wound 09/10/21 Back Right;Upper   Date First Assessed/Time First Assessed: 09/10/21 1043   Present on Hospital Admission: Yes  Location: Back  Wound Location Orientation: Right;Upper   Wound Image      Under breasts some redness.  Antifungal powder in place  Left groin red line  Perianal area- fungal, moist red    Impression:  Bilateral stage 1 pressure injuries heels  Stage 2 sacral ulcer  IAD,   Small areas torn skin rt posterior shoulder    Plan: SOS  Lo air loss  Sure prep to heels  aquaphor to buttocks  antifungal to perianal area      JITENDRA Aragon - CNS 9/10/2021 11:25 AM

## 2021-09-10 NOTE — PROGRESS NOTES
Epifanio Powell was ordered rOPINIRole (REQUIP XL) which is a nonformulary medication. The patient has indicated that the home supply of this medication will be brought in to the hospital for inpatient use. If the medication has not been administered by 1400 on the following day from the time the order was placed, a pharmacist will follow-up with the nurse of the patient to assess the capability of the patient to bring in the medication. If it is determined that the patient cannot supply the medication and it is not available to be dispensed from the pharmacy, a call will be placed to the ordering provider to discuss alternative options.     Kay Castro [facility]  9/10/2021  12:32 AM

## 2021-09-10 NOTE — PATIENT CARE CONFERENCE
P Quality Flow/Interdisciplinary Rounds Progress Note        Quality Flow Rounds held on September 10, 2021    Disciplines Attending:  Bedside Nurse, ,  and Nursing Unit Leadership    Merlin Notice was admitted on 9/9/2021  7:42 PM    Anticipated Discharge Date:  Expected Discharge Date: 09/14/21    Disposition:    Enrrique Score:  Enrrique Scale Score: 16    Readmission Risk              Risk of Unplanned Readmission:  0           Discussed patient goal for the day, patient clinical progression, and barriers to discharge.   The following Goal(s) of the Day/Commitment(s) have been identified:  Diagnostics - Report Results      Thierry Estrada RN  September 10, 2021

## 2021-09-10 NOTE — PLAN OF CARE
Problem: Falls - Risk of:  Goal: Will remain free from falls  Description: Will remain free from falls  9/10/2021 1036 by Kody Becerra RN  Outcome: Met This Shift  9/10/2021 0250 by Dexter Allen RN  Outcome: Met This Shift  Goal: Absence of physical injury  Description: Absence of physical injury  9/10/2021 1036 by Kody Becerra RN  Outcome: Met This Shift  9/10/2021 0250 by Dexter Allen RN  Outcome: Met This Shift     Problem: Skin Integrity:  Goal: Will show no infection signs and symptoms  Description: Will show no infection signs and symptoms  9/10/2021 1036 by Kody Becerra RN  Outcome: Met This Shift  9/10/2021 0250 by Dexter Allen RN  Outcome: Ongoing  Goal: Absence of new skin breakdown  Description: Absence of new skin breakdown  9/10/2021 1036 by Kody Becerra RN  Outcome: Met This Shift  9/10/2021 0250 by Dexter Allen RN  Outcome: Ongoing     Problem: Urinary Elimination:  Goal: Signs and symptoms of infection will decrease  Description: Signs and symptoms of infection will decrease  Outcome: Met This Shift  Goal: Complications related to the disease process, condition or treatment will be avoided or minimized  Description: Complications related to the disease process, condition or treatment will be avoided or minimized  Outcome: Met This Shift     Problem: Sensory:  Goal: General experience of comfort will improve  Description: General experience of comfort will improve  9/10/2021 1036 by Kody Becerra RN  Outcome: Ongoing  9/10/2021 0250 by Dexter Allen RN  Outcome: Met This Shift     Problem: Urinary Elimination:  Goal: Ability to reestablish a normal urinary elimination pattern will improve - after catheter removal  Description: Ability to reestablish a normal urinary elimination pattern will improve  Outcome: Ongoing

## 2021-09-10 NOTE — CARE COORDINATION
Social Work discharge planning   Sw consult for discharge planning noted. Sw spoke to pt, who lives alone and said she was \"stuck in the tub until I didn't show up for a volleyball game and my daughter came to check on me\". Pt said she lives in 1 story home, has std cane and std walker and drives. Pt denies recent hhc or rehab. SW discussed her PT AMPAC from today of 12/24. We discussed SNF and hhc. Sw advised she would likley need 24 hour care from family at home if they decide on home. Pt said she wants to talk to her daughters before making any decisions. Sw offered to talk to her daughters as well. Pt said she will talk to her daughters first and let Sw know. Sw gave her SNF list for [facility] and [facility] to review for choices with her daughters. Await pt's discussion with family.    Electronically signed by Blake Watson on 9/10/2021 at 10:06 AM

## 2021-09-10 NOTE — PROGRESS NOTES
Occupational Therapy  OCCUPATIONAL THERAPY INITIAL EVALUATION  [facility]  [address] [facility], [place]    Date: 9/10/2021     Patient Name: Kenneth Velez  MRN: [id number]  : 1940  Room: [address] [place] [address]    Evaluating OT: Rosa M Rdz, OTR/L - [id number]    Referring Provider: Ashvin Borjas DO  Specific Provider Orders/Date: \"OT eval and treat\" - 9/10/2021    Diagnosis: UTI (urinary tract infection) [N39.0]     Patient presented to ED s/p being unable to get out of her jacuzzi tub for two days. Pertinent Medical History: Parkinson's disease, skin cancer, HTN, DJD     Precautions: fall risk, bed/chair alarms, skin integrity    Assessment of Current Deficits:    [x] Functional mobility   [x]ADLs  [x] Strength               [x]Cognition   [x] Functional transfers   [x] IADLs         [x] Safety Awareness   [x]Endurance   [] Fine Coordination              [x] Balance      [] Vision/perception   [x]Sensation    []Gross Motor Coordination  [] ROM  [] Delirium                   [] Motor Control     OT PLAN OF CARE   OT POC is based on physician orders, patient diagnosis, and results of clinical assessment.   Frequency/Duration 2-5 days/week for 2 weeks PRN   Specific OT Treatment Interventions to Include:   * Instruction/training on adapted ADL techniques and AE recommendations to increase functional independence within precautions       * Training on energy conservation strategies, correct breathing pattern and techniques to improve independence/tolerance for self-care routine  * Functional transfer/mobility training/DME recommendations for increased independence, safety, and fall prevention  * Patient/Family education to increase follow through with safety techniques and functional independence  * Recommendation of environmental modifications for increased safety with functional transfers/mobility and ADLs  * Therapeutic exercise to improve motor endurance, ROM, and functional strength for ADLs/functional transfers  * Therapeutic activities to facilitate/challenge dynamic balance, stand tolerance for increased safety and independence with ADLs  * Neuro-muscular re-education: facilitation of righting/equilibrium reactions, midline orientation, scapular stability/mobility, normalization of muscle tone, and facilitation of volitional active controled movement  * Positioning to improve skin integrity, interaction with environment and functional independence    Recommended Adaptive Equipment: TBD    Home Living: Patient lives alone in a two-floor home; patient's bedroom and bathroom are on the main living level. Laundry is on the main living level. Bathroom Setup: walk-in shower (with seat, grab bars, and handheld shower head), jacuzzi tub (which patient does not typically use)  Equipment Owned: cane    Prior Level of Function (PLOF): Patient was independent with ADLs, IADLs, and functional mobility (with use of cane within the community) prior to this hospitalization. Driving: Yes    Pain Level: Patient reported experiencing pain in her buttocks, but did not rate her pain. Cognition: Patient alert and oriented grossly. WFL command follow demonstrated. Patient pleasant, cooperative, and motivated to return to PLOF. Memory: Fair  Sequencing: WFL  Problem Solving: WFL  Judgement/Safety: Fair+    Functional Assessment:  AM-PAC Daily Activity Raw Score: 15/24   Initial Eval Status  Date: 9/10/2021 Treatment Status  Date:  Short Term Goals = Long Term Goals   Feeding Setup, SBA, and increased time needed with lunch tray items. [facility] noted.      Grooming Min A  Mod I  (seated/standign at sink)   UB Dressing Min A  Setup   LB Dressing Max A  Min A - with use of AE, as needed/appropriate   Bathing Max A  Min A - with use of AE/DME, as needed/appropriate   Toileting Max A  Min A   Bed Mobility  Supine-to-Sit: Min A      Functional Transfers Sit-to-Stand: Min A from EOB and bedside chair  Cues needed to maximize safety. Independent   Functional Mobility Min A   (with walker) within patient's room and bathroom. Cues needed occasionally to maximize safety with management of walker. Mod I with functional mobility (with device, as needed/appropriate) in order to maximize independence with ADLs/IADLs and other functional tasks. Balance Sitting: Good  (at EOB)  Standing: Fair-  (with walker)  Fair+ dynamic standing balance during completion of ADLs/IADLs and other functional tasks. Activity Tolerance Fair  Patient will demonstrate Good understanding and consistent implementation of energy conservation techniques and work simplification techniques into ADL/IADL routines. Visual/  Perceptual WFL grossly     N/A   B UE Strength 3+/5  Patient will demonstrate 4/5 B UE strength in order to maximize independence with ADLs/IADLs and functional transfers. Additional Long-Term Goal: Patient will increase functional independence to PLOF in order to allow patient to live in least restrictive environment. ROM: Additional Information:    R UE  WFL Mild tremors noted occasionally in R hand. Fair [address] noted. L UE WFL Mild tremors noted occasionally in L hand. Hearing: [facility]  Sensation: No complaints of numbness/tingling in B UEs. Tone: WFL  Edema: No    Comments: RN approved patient's participation in [address] [place] [address] activities. Upon arrival, patient supine in bed. At end of session, patient seated in bedside chair with call light and phone within reach, waffle cushion in place, B LEs elevated on ottoman (with heels floated), patient's daughter present, and all lines and tubes intact (nursing aware). Patient would benefit from continued skilled OT to increase safety and independence with completion of ADL/IADL tasks for functional independence and quality of life.      Treatment: OT treatment provided this date included:    Instruction/training on safety and adapted techniques for completion of ADLs.   Instruction/training on safe functional mobility/transfer techniques. Patient education provided regardin) importance of having staff assistance with ADLs and other OOB activities to prevent falls/injury during hospitalization, 2) techniques to maximize safety with management of walker, particularly during ADLs. Patient indicated [address] understanding. Further skilled OT treatment indicated to increase patient's safety and independence with completion of ADL/IADL tasks in order to maximize patient's functional independence and quality of life. Rehab Potential: Good for established goals. Patient / Family Goal: Patient indicated that she wants to return to her PLOF. Patient and/or family were instructed on functional diagnosis, prognosis/goals, and OT plan of care. Demonstrated Good understanding. Eval Complexity: Low    Time In: 1055  Time Out: 1140  Total Treatment Time: 25 minutes      Minutes Units   OT Eval Low 13626 20 1   OT Eval Medium 56339     OT Eval High 90052     OT Re-Eval [id number]     Therapeutic Ex 59682     Therapeutic Activities 89496 15 1   ADL/Self Care 01219 10 1   Orthotic Management 94112     Neuro Re-Ed 68774     Non-Billable Time N/A ---     Evaluation time includes thorough review of current medical information, gathering information on past medical history/social history and prior level of function, completion of standardized testing/informal observation of tasks, assessment of data, and education on plan of care and goals. YULISSA Morris/L  License Number: [id number]

## 2021-09-10 NOTE — PROGRESS NOTES
Physical Therapy    Facility/Department: [facility] MED SURG  Initial Assessment    NAME: Tello Felotn  : 1940  MRN: [id number]    Date of Service: 9/10/2021      Attending Provider:  Olag Lidia Blue DO    Evaluating PT:  Michael Fairbanks, P.T. Room #:  1095/8759-Q  Diagnosis:  UTI (urinary tract infection) [N39.0], got stuck in her whirlpool tub x2 days. She was unable to get out on her own. Pertinent PMHx/PSHx:  Parkinson's disease, DJD multiple joints  Precautions:  Falls, bed/chair alarm    SUBJECTIVE:    Pt lives alone in a 2 story home with 1 step to enter. Her bed and bath are on the first floor. Pt ambulated with a cane PTA. OBJECTIVE:   Initial Evaluation  Date: 9/10/21 Treatment Short Term/ Long Term   Goals   Was pt agreeable to Eval/treatment? yes     Does pt have pain? No c/o pain     Bed Mobility  Rolling: MAX A  Supine to sit: MAX A  Sit to supine: MAX A  Scooting: MAX A  Independent    Transfers Sit to stand: MOD A  Stand to sit: MIN A  Stand pivot: NA  supervision   Ambulation   7 small side steps with ww toward HOB with MOD A  150 feet with AAD with SBA   Stair negotiation: ascended and descended NA  1 step with 1 rail SBA   AM-PAC 6 Clicks 59/86       BLE ROM is WFL. BLE strength is grossly 2/5 to 3-/5. Sensation:  Pt denies numbness and tingling to extremities  Edema:  None noted  Balance: sitting is MOD A initially, but improved to SBA. Standing with ww is MIN A/MOD A  Endurance: fair-    Patient education  Pt educated on hand placement during bed mobility, transfers and weight shifts while attempting to side step toward HOB.     Patient response to education:   Pt verbalized understanding Pt demonstrated skill Pt requires further education in this area   yes Yes with VCs and assist yes     ASSESSMENT:    Conditions Requiring Skilled Therapeutic Intervention:    [x]Decreased strength     []Decreased ROM  [x]Decreased functional mobility  [x]Decreased balance   [x]Decreased endurance   []Decreased posture  []Decreased sensation  []Decreased coordination   []Decreased vision  []Decreased safety awareness   []Increased pain   Comments:  Pt was found supine in bed and agreeable to PT. She is very weak and required MOD A/MAX A for most functional mobility today. Pt was noted to have resting tremors B ankles and LUE and also freezing episodes when attempting to side step with ww which she reports is from her Parkinson's disease. Pt has decreased strength and endurance that is limiting her functional mobility at this time. Pt was left supine in bed with call light left by patient. Chair/bed alarm: bed alarm was re-activated. Pt's/ family goals   1. To get stronger and go home. Patient and or family understand(s) diagnosis, prognosis, and plan of care. PHYSICAL THERAPY PLAN OF CARE:    PT POC is established based on physician order and patient diagnosis     Referring provider/PT Order:  PT eval and treat  Diagnosis:  UTI (urinary tract infection) [N39.0]  Specific instructions for next treatment:  Work on improving pt's strength with functional mobility.      Current Treatment Recommendations:     [x] Strengthening to improve independence with functional mobility   [] ROM to improve ROM and decrease spasm and pain which will help promote independence with functional mobility   [x] Balance Training to improve static/dynamic balance and to reduce fall risk  [x] Endurance Training to improve activity tolerance during functional mobility   [x] Transfer Training to improve safety and independence with all functional transfers   [x] Gait Training to improve gait mechanics, endurance and assess need for appropriate assistive device  [x] Stair Training in preparation for safe discharge home and/or into the community   [] Positioning to prevent skin breakdown and contractures  [] Safety and Education Training   [x] Patient/Caregiver Education   [] HEP  [] Other     PT long term treatment goals are located in above grid    Frequency of treatments: 2-5x/week x 1-2 weeks. Time in  08:25  Time out  08:45    Evaluation Time includes thorough review of current medical information, gathering information on past medical history/social history and prior level of function, completion of standardized testing/informal observation of tasks, assessment of data and education on plan of care and goals. CPT codes:  [x] Low Complexity PT evaluation 32765  [] Moderate Complexity PT evaluation 62357  [] High Complexity PT evaluation 50197  [] PT Re-evaluation 33253  [] Gait training 17162 ** minutes  [] Manual therapy 57987 ** minutes  [] Therapeutic activities 43508 ** minutes  [] Therapeutic exercises 87434 ** minutes  [] Neuromuscular reeducation 35267 ** minutes     Ean Mccord., P.T.   License Number: [id number]

## 2021-09-10 NOTE — ED NOTES
Spoke to Lilli at ext [phone], confirmed sbar. Pt prepared for transport.      Jia Patel RN  09/09/21 2685

## 2021-09-10 NOTE — PROGRESS NOTES
Requip XL 12 mg tablet  to be administered from patients own supply, which has been verified by pharmacy and ready for  on 9/10/2021 11:33 AM -[place] from [address]

## 2021-09-10 NOTE — H&P
Internal Medicine History & Physical     Name: Bri Melgoza  : 1940  Chief Complaint: Altered Mental Status (per family ams, found in bath tub, denies falling she could not get out herself. states she was in tub 2x days)  Primary Care Physician: Aiden Wright DO  Admission date: 2021  Date of service: 9/10/2021     History of Present Illness  Elva Recio is a [de-identified]y.o. year old female with past medical history of hyperlipidemia, Parkinson's, hypertension who went to the emergency department yesterday after being stuck in a whirlpool bath tub over the past 2 days. She said that she was in the tub and was too weak to get out, there were no specific focal deficits she did not hit her head she was just too weak to lift her self out. She did drain the water but did not eat or drink for 2 days. Her daughter went to check on her yesterday and called EMS and they brought her to the hospital.  Patient was initially altered and confused on arrival to the emergency department, she is alert and oriented today. She does not have any specific complaints. Lab work in the emergency department showed initial BUN of 25 creatinine 1.2, potassium was still a decreased at 3.4. Her troponin was mildly elevated and her liver enzymes as well as CBC were within normal limits. Blood cultures and urine cultures are pending, her urine did show white blood cells, red blood cells, bacteria, leukocyte esterase. She also had substantial ketones in her urine as well. CT head and chest x-ray did not show any abnormalities. She has been going to the  to get stronger and was in the whirlpool to relax, she does normally ambulate with a cane at home. She would be agreeable to going to rehab after hospital stay. She still feeling weak but is resting comfortably this morning. The patient presents with weakness that has been going on for 3 days. These symptoms are improving in severity. Symptoms are made better by resting.  Symptoms are made worse by exertion. Associated symptoms include nothing. There are 1 family or friends at bedside, her daughter. The history is provided by the patient and her daughter. Kaden Reilly is felt to be a good historian. ED course:   Initial blood work and imaging studies performed. Admission recommended by ED physician. Case discussed with ED provider. Meds in ED consisted of the following:  Medications   0.9 % sodium chloride bolus (1,000 mLs IntraVENous New Bag 9/9/21 2103)   cefTRIAXone (ROCEPHIN) 2,000 mg in sterile water 20 mL IV syringe (has no administration in time range)       Past Medical History:   Diagnosis Date    Degenerative joint disease involving multiple joints     Hyperlipidemia     Hypertension     Parkinson disease ([address]     Skin cancer     scc       Past Surgical History:   Procedure Laterality Date    KNEE SURGERY  left    TONSILLECTOMY AND ADENOIDECTOMY         Family History   Problem Relation Age of Onset    No Known Problems Mother     No Known Problems Father        Social History  Patient lives at home alone. Employment: none  Illicit drug use- denies  TOBACCO:   reports that she has never smoked. She has never used smokeless tobacco.  ETOH:   reports previous alcohol use. Home Medications  Prior to Admission medications    Medication Sig Start Date End Date Taking? Authorizing Provider   carbidopa-levodopa (SINEMET)  MG per tablet Take 1.5 tablets by mouth 3 times daily  4/26/19  Yes Historical Provider, MD   ropinirole (REQUIP) 1 MG tablet Take 12 mg by mouth every morning (before breakfast)    Yes Historical Provider, MD   nortriptyline (PAMELOR) 10 MG capsule Take 10 mg by mouth nightly    Yes Historical Provider, MD   Handicap Placard MISC by Does not apply route    Historical Provider, MD       Allergies  No Known Allergies    Review of Systems  Please see HPI above. All bolded are positive. All un-bolded are negative.   Constitutional Symptoms: fever, chills, fatigue, generalized weakness, diaphoresis, increase in thirst, loss of appetite  Eyes: vision change   Ears, Nose, Mouth, Throat: hearing loss, nasal congestion, sores in the mouth  Cardiovascular: chest pain, chest heaviness, palpitations  Respiratory: shortness of breath, wheezing, coughing  Gastrointestinal: abdominal pain, nausea, vomiting, diarrhea, constipation, melena, hematochezia, hematemesis  Genitourinary: dysuria, hematuria, increased frequency  Musculoskeletal: lower extremity edema, myalgias, arthralgias, back pain  Integumentary: rashes, itching   Neurological: headache, lightheadedness, dizziness, confusion, syncope, numbness, tingling, focal weakness  Psychiatric: depression, suicidal ideation, anxiety  Endocrine: unintentional weight change  Hematologic/Lymphatic: lymphadenopathy, easy bruising, easy bleeding   Allergic/Immunologic: recurrent infections      Objective  VITALS:  /82   Pulse 80   Temp 98.1 °F (36.7 °C) (Oral)   Resp 17   Ht 4' 11\" (1.499 m)   Wt 154 lb (69.9 kg)   SpO2 95%   BMI 31.10 kg/m²     Physical Exam:  General: awake, alert, oriented to person, place, time, and purpose, appears stated age, cooperative, no acute distress, pleasant, appropriate mood  Eyes: conjunctivae/corneas clear, sclera non icteric, EOMI  Ears: no obvious scars, no lesions, no masses, hearing intact  Mouth: mucous membranes moist, no obvious oral sores  Head: normocephalic, atraumatic  Neck: no JVD, no adenopathy, no thyromegaly, neck is supple, trachea is midline  Back: ROM normal, no CVA tenderness.   Chest: no pain on palpation  Lungs: clear to auscultation bilaterally, without rhonchi, crackle, wheezing, or rale, no retractions or use of accessory muscles  Heart: regular rate and regular rhythm, no murmur, normal S1, S2  Abdomen: soft, non-tender; bowel sounds normal; no masses, no organomegaly  : Deferred   Extremities: no lower extremity edema, extremities atraumatic, no cyanosis, no clubbing, 2+ pedal pulses palpated  Skin: normal color, normal texture, normal turgor, no rashes, no lesions  Neurologic: reduced muscle strength throughout, normal muscle tone throughout, face symmetric, hearing intact, tongue midline, speech appropriate without slurring, sensation to fine touch intact in upper and lower extremities, generalized weakness but no focal deficits    Labs-   Lab Results   Component Value Date    WBC 9.3 09/10/2021    HGB 14.3 09/10/2021    HCT 43.9 09/10/2021     09/10/2021     09/10/2021    K 3.6 09/10/2021     (H) 09/10/2021    CREATININE 0.9 09/10/2021    BUN 22 09/10/2021    CO2 21 (L) 09/10/2021    GLUCOSE 88 09/10/2021    ALT 15 09/10/2021    AST 48 (H) 09/10/2021     No results found for: CKTOTAL, CKMB, CKMBINDEX, TROPONINI      Recent Radiological Studies:  CT HEAD WO CONTRAST   Final Result   No acute intracranial hemorrhage or edema. XR CHEST PORTABLE   Final Result   No evidence of pneumonia or pleural effusion. Assessment  Active Hospital Problems    Diagnosis     UTI (urinary tract infection) [N39.0]     Hypertension [I10]     At high risk for falls [Z91.81]     Hyperlipidemia [E78.5]     Degenerative joint disease involving multiple joints [M15.9]     Parkinson disease ([address] [G20]        Patient Active Problem List    Diagnosis Date Noted    UTI (urinary tract infection) 09/09/2021    Hypertension     At high risk for falls 05/09/2019    Hyperlipidemia 10/28/2014    Degenerative joint disease involving multiple joints 03/19/2014    Parkinson disease ([address] 03/19/2014       Plan  UTI/metabolic encephalopathy: UA noted. Urine cx pending. IV Rocephin. Dehydration, improving: IVF. · Continue home medications  · PT/OT-PT AM PAC 12 out of 24  · Follow labs  · DVT prophylaxis. · Please see orders for further management and care.   ·  for discharge planning  · Discharge plan: SNF will be katrin Bacon DO PGY-1  9/10/2021  9:10 AM    I can be reached through [employer]. NOTE:  This report was transcribed using voice recognition software. Every effort was made to ensure accuracy; however, inadvertent computerized transcription errors may be present. Addendum: I have personally participated in a face-to-face history and physical exam on the date of service with the patient. I have discussed the case with the resident. I also participated in medical decision making with the resident on the date of service and I agree with all of the pertinent clinical information unless indicated in my editing of the note. I have reviewed and edited the note above based on my findings during my history, exam, and decision making on the same day of service. Electronically signed by Meg Palomares DO on 9/10/2021 at 9:11 AM    I can be reached through [facility].

## 2021-09-11 LAB
ALBUMIN SERPL-MCNC: 3.1 G/DL (ref 3.5–5.2)
ALP BLD-CCNC: 63 U/L (ref 35–104)
ALT SERPL-CCNC: <5 U/L (ref 0–32)
ANION GAP SERPL CALCULATED.3IONS-SCNC: 9 MMOL/L (ref 7–16)
AST SERPL-CCNC: 37 U/L (ref 0–31)
BASOPHILS ABSOLUTE: 0.08 E9/L (ref 0–0.2)
BASOPHILS RELATIVE PERCENT: 1 % (ref 0–2)
BILIRUB SERPL-MCNC: 0.5 MG/DL (ref 0–1.2)
BUN BLDV-MCNC: 20 MG/DL (ref 6–23)
CALCIUM SERPL-MCNC: 8.5 MG/DL (ref 8.6–10.2)
CHLORIDE BLD-SCNC: 110 MMOL/L (ref 98–107)
CO2: 22 MMOL/L (ref 22–29)
CREAT SERPL-MCNC: 0.9 MG/DL (ref 0.5–1)
EOSINOPHILS ABSOLUTE: 0.44 E9/L (ref 0.05–0.5)
EOSINOPHILS RELATIVE PERCENT: 5.8 % (ref 0–6)
GFR AFRICAN AMERICAN: >60
GFR NON-AFRICAN AMERICAN: >60 ML/MIN/1.73
GLUCOSE BLD-MCNC: 82 MG/DL (ref 74–99)
HCT VFR BLD CALC: 41.9 % (ref 34–48)
HEMOGLOBIN: 13.3 G/DL (ref 11.5–15.5)
IMMATURE GRANULOCYTES #: 0.06 E9/L
IMMATURE GRANULOCYTES %: 0.8 % (ref 0–5)
LYMPHOCYTES ABSOLUTE: 1.6 E9/L (ref 1.5–4)
LYMPHOCYTES RELATIVE PERCENT: 21 % (ref 20–42)
MCH RBC QN AUTO: 31.8 PG (ref 26–35)
MCHC RBC AUTO-ENTMCNC: 31.7 % (ref 32–34.5)
MCV RBC AUTO: 100.2 FL (ref 80–99.9)
MONOCYTES ABSOLUTE: 0.87 E9/L (ref 0.1–0.95)
MONOCYTES RELATIVE PERCENT: 11.4 % (ref 2–12)
NEUTROPHILS ABSOLUTE: 4.58 E9/L (ref 1.8–7.3)
NEUTROPHILS RELATIVE PERCENT: 60 % (ref 43–80)
PDW BLD-RTO: 12.9 FL (ref 11.5–15)
PLATELET # BLD: 240 E9/L (ref 130–450)
PMV BLD AUTO: 10 FL (ref 7–12)
POTASSIUM REFLEX MAGNESIUM: 4.3 MMOL/L (ref 3.5–5)
RBC # BLD: 4.18 E12/L (ref 3.5–5.5)
SODIUM BLD-SCNC: 141 MMOL/L (ref 132–146)
TOTAL PROTEIN: 5.6 G/DL (ref 6.4–8.3)
WBC # BLD: 7.6 E9/L (ref 4.5–11.5)

## 2021-09-11 PROCEDURE — 96372 THER/PROPH/DIAG INJ SC/IM: CPT

## 2021-09-11 PROCEDURE — 80053 COMPREHEN METABOLIC PANEL: CPT

## 2021-09-11 PROCEDURE — 85025 COMPLETE CBC W/AUTO DIFF WBC: CPT

## 2021-09-11 PROCEDURE — 6370000000 HC RX 637 (ALT 250 FOR IP): Performed by: INTERNAL MEDICINE

## 2021-09-11 PROCEDURE — G0378 HOSPITAL OBSERVATION PER HR: HCPCS

## 2021-09-11 PROCEDURE — 96376 TX/PRO/DX INJ SAME DRUG ADON: CPT

## 2021-09-11 PROCEDURE — 6360000002 HC RX W HCPCS: Performed by: INTERNAL MEDICINE

## 2021-09-11 PROCEDURE — 1200000000 HC SEMI PRIVATE

## 2021-09-11 PROCEDURE — 2580000003 HC RX 258: Performed by: INTERNAL MEDICINE

## 2021-09-11 PROCEDURE — 6370000000 HC RX 637 (ALT 250 FOR IP): Performed by: EMERGENCY MEDICINE

## 2021-09-11 PROCEDURE — 36415 COLL VENOUS BLD VENIPUNCTURE: CPT

## 2021-09-11 RX ADMIN — ENOXAPARIN SODIUM 40 MG: 40 INJECTION SUBCUTANEOUS at 08:03

## 2021-09-11 RX ADMIN — MICONAZOLE NITRATE: 2 OINTMENT TOPICAL at 20:31

## 2021-09-11 RX ADMIN — CARBIDOPA AND LEVODOPA 1.5 TABLET: 25; 100 TABLET ORAL at 08:03

## 2021-09-11 RX ADMIN — MICONAZOLE NITRATE: 20 POWDER TOPICAL at 20:31

## 2021-09-11 RX ADMIN — CARBIDOPA AND LEVODOPA 1.5 TABLET: 25; 100 TABLET ORAL at 21:11

## 2021-09-11 RX ADMIN — PETROLATUM: 420 OINTMENT TOPICAL at 08:04

## 2021-09-11 RX ADMIN — WATER 1000 MG: 1 INJECTION INTRAMUSCULAR; INTRAVENOUS; SUBCUTANEOUS at 22:05

## 2021-09-11 RX ADMIN — POTASSIUM CHLORIDE AND SODIUM CHLORIDE: 900; 150 INJECTION, SOLUTION INTRAVENOUS at 04:44

## 2021-09-11 RX ADMIN — ROPINIROLE 12 MG: 12 TABLET, FILM COATED, EXTENDED RELEASE ORAL at 08:07

## 2021-09-11 RX ADMIN — POTASSIUM CHLORIDE AND SODIUM CHLORIDE: 900; 150 INJECTION, SOLUTION INTRAVENOUS at 14:01

## 2021-09-11 RX ADMIN — MICONAZOLE NITRATE: 2 OINTMENT TOPICAL at 08:04

## 2021-09-11 RX ADMIN — ACETAMINOPHEN 650 MG: 325 TABLET ORAL at 11:29

## 2021-09-11 RX ADMIN — PETROLATUM: 420 OINTMENT TOPICAL at 20:31

## 2021-09-11 RX ADMIN — CARBIDOPA AND LEVODOPA 1.5 TABLET: 25; 100 TABLET ORAL at 13:50

## 2021-09-11 RX ADMIN — NORTRIPTYLINE HYDROCHLORIDE 10 MG: 10 CAPSULE ORAL at 21:12

## 2021-09-11 RX ADMIN — MICONAZOLE NITRATE: 20 POWDER TOPICAL at 08:04

## 2021-09-11 ASSESSMENT — PAIN DESCRIPTION - ONSET: ONSET: ON-GOING

## 2021-09-11 ASSESSMENT — PAIN SCALES - GENERAL
PAINLEVEL_OUTOF10: 3
PAINLEVEL_OUTOF10: 0
PAINLEVEL_OUTOF10: 0

## 2021-09-11 ASSESSMENT — PAIN DESCRIPTION - PROGRESSION: CLINICAL_PROGRESSION: NOT CHANGED

## 2021-09-11 ASSESSMENT — PAIN DESCRIPTION - LOCATION: LOCATION: LEG

## 2021-09-11 ASSESSMENT — PAIN DESCRIPTION - DESCRIPTORS: DESCRIPTORS: ACHING

## 2021-09-11 ASSESSMENT — PAIN DESCRIPTION - FREQUENCY: FREQUENCY: INTERMITTENT

## 2021-09-11 ASSESSMENT — PAIN DESCRIPTION - ORIENTATION: ORIENTATION: RIGHT;LEFT

## 2021-09-11 ASSESSMENT — PAIN DESCRIPTION - PAIN TYPE: TYPE: ACUTE PAIN

## 2021-09-11 ASSESSMENT — PAIN - FUNCTIONAL ASSESSMENT: PAIN_FUNCTIONAL_ASSESSMENT: ACTIVITIES ARE NOT PREVENTED

## 2021-09-11 NOTE — PLAN OF CARE
Problem: Falls - Risk of:  Goal: Will remain free from falls  Description: Will remain free from falls  Outcome: Met This Shift  Goal: Absence of physical injury  Description: Absence of physical injury  Outcome: Met This Shift     Problem: Skin Integrity:  Goal: Will show no infection signs and symptoms  Description: Will show no infection signs and symptoms  Outcome: Met This Shift  Goal: Absence of new skin breakdown  Description: Absence of new skin breakdown  Outcome: Met This Shift     Problem: Sensory:  Goal: General experience of comfort will improve  Description: General experience of comfort will improve  Outcome: Met This Shift     Problem: Urinary Elimination:  Goal: Signs and symptoms of infection will decrease  Description: Signs and symptoms of infection will decrease  Outcome: Met This Shift  Goal: Ability to reestablish a normal urinary elimination pattern will improve - after catheter removal  Description: Ability to reestablish a normal urinary elimination pattern will improve  Outcome: Met This Shift  Goal: Complications related to the disease process, condition or treatment will be avoided or minimized  Description: Complications related to the disease process, condition or treatment will be avoided or minimized  Outcome: Met This Shift

## 2021-09-11 NOTE — PROGRESS NOTES
Internal Medicine Progress Note    Patient's name: Tello Felton  : 1940  Chief complaints (on day of admission): Altered Mental Status (per family ams, found in bath tub, denies falling she could not get out herself. states she was in tub 2x days)  Admission date: 2021  Date of service: 2021   Room: [address] SURG  Primary care physician: Ragini Ross DO  Reason for visit: Follow-up for UTI    Subjective  Gianna was seen and examined. She is doing very well today, eating lunch without issues and has no new complaints    She states that she would prefer to go home at [place] but we did discuss her low PT score, the reason she came to the hospital and the safest option being a SNF - she states she will think about it    Review of Systems  There are no new complaints of chest pain, shortness of breath, abdominal pain, nausea, vomiting, diarrhea, constipation.     Hospital Medications  Current Facility-Administered Medications   Medication Dose Route Frequency Provider Last Rate Last Admin    nortriptyline (PAMELOR) capsule 10 mg  10 mg Oral Nightly Christian LATHA Volino, DO   10 mg at 09/10/21 2156    0.9% NaCl with KCl 20 mEq infusion   IntraVENous Continuous Christian LATHA Volino,  mL/hr at 21 0444 New Bag at 21 0444    cefTRIAXone (ROCEPHIN) 1,000 mg in sterile water 10 mL IV syringe  1,000 mg IntraVENous Q24H Christian E Volino, DO   1,000 mg at 09/10/21 2257    sodium chloride flush 0.9 % injection 10 mL  10 mL IntraVENous 2 times per day Christian E Volino, DO   10 mL at 09/10/21 0905    sodium chloride flush 0.9 % injection 10 mL  10 mL IntraVENous PRN Christian E Volino, DO        0.9 % sodium chloride infusion  25 mL IntraVENous PRN Christian E Volino, DO        potassium chloride (KLOR-CON M) extended release tablet 40 mEq  40 mEq Oral PRN Christian E Volino, DO        Or    potassium bicarb-citric acid (EFFER-K) effervescent tablet 40 mEq  40 mEq Oral PRN Christian E Volino, DO        Or    potassium chloride 10 mEq/100 mL IVPB (Peripheral Line)  10 mEq IntraVENous PRN Christian Gutierrezino, DO        enoxaparin (LOVENOX) injection 40 mg  40 mg SubCUTAneous Daily Christian Kurtz, DO   40 mg at 09/10/21 0902    ondansetron (ZOFRAN-ODT) disintegrating tablet 4 mg  4 mg Oral Q8H PRN Christian Kurtz, DO        Or    ondansetron (ZOFRAN) injection 4 mg  4 mg IntraVENous Q6H PRN Christian Kurtz, DO        senna (SENOKOT) tablet 8.6 mg  1 tablet Oral Daily PRN Christian Gutierrezino, DO        acetaminophen (TYLENOL) tablet 650 mg  650 mg Oral Q6H PRN Christian Gutierrezino, DO        Or    acetaminophen (TYLENOL) suppository 650 mg  650 mg Rectal Q6H PRN Christian Kurtz, DO        miconazole (MICOTIN) 2 % powder   Topical BID Christian Kurtz, DO   Given at 09/10/21 2156    rOPINIRole (REQUIP XL) extended release tablet 12 mg  12 mg Oral Daily with breakfast Christian Kurtz, DO   12 mg at 09/10/21 1146    carbidopa-levodopa (SINEMET)  MG per tablet 1.5 tablet  1.5 tablet Oral TID Brianna Bongo, DO   1.5 tablet at 09/10/21 2155    miconazole nitrate 2 % ointment   Topical BID Christian Kurtz, DO   Given at 09/10/21 2156    white petrolatum ointment   Topical BID Christian Kurtz, DO   Given at 09/10/21 2157       PRN Medications  sodium chloride flush, sodium chloride, potassium chloride **OR** potassium alternative oral replacement **OR** potassium chloride, ondansetron **OR** ondansetron, senna, acetaminophen **OR** acetaminophen    Objective  Most Recent Recorded Vitals  BP (!) 149/86   Pulse 74   Temp 98.5 °F (36.9 °C) (Oral)   Resp 18   Ht 4' 11\" (1.499 m)   Wt 165 lb (74.8 kg)   SpO2 94%   BMI 33.33 kg/m²   I/O last 3 completed shifts: In: 5 [P.O.:420]  Out: 400 [Urine:400]  No intake/output data recorded.     Physical Exam:  General: AAO to person/place/time/purpose, NAD, no labored breathing  Eyes: conjunctivae/corneas clear, sclera non icteric  Skin: color/texture/turgor normal, no rashes or lesions  Lungs: CTAB, no retractions/use of accessory muscles, no vocal fremitus, no rhonchi, no crackle, no rales  Heart: regular rate, regular rhythm, no murmur  Abdomen: soft, NT, bowel sounds normal  Extremities: atraumatic, no edema  Neurologic: cranial nerves 2-12 grossly intact, no slurred speech    Most Recent Labs  Lab Results   Component Value Date    WBC 7.6 09/11/2021    HGB 13.3 09/11/2021    HCT 41.9 09/11/2021     09/11/2021     09/11/2021    K 4.3 09/11/2021     (H) 09/11/2021    CREATININE 0.9 09/11/2021    BUN 20 09/11/2021    CO2 22 09/11/2021    GLUCOSE 82 09/11/2021    ALT <5 09/11/2021    AST 37 (H) 09/11/2021    TSH 1.946 05/09/2012       CT HEAD WO CONTRAST   Final Result   No acute intracranial hemorrhage or edema. XR CHEST PORTABLE   Final Result   No evidence of pneumonia or pleural effusion. Assessment   Active Hospital Problems    Diagnosis     UTI (urinary tract infection) [N39.0]     Hypertension [I10]     At high risk for falls [Z91.81]     Hyperlipidemia [E78.5]     Degenerative joint disease involving multiple joints [M15.9]     [address] disease [address] [address] [G20]          Plan  · UTI/metabolic encephalopathy: UA noted. Urine cx pending. IV Rocephin. · Dehydration, improving: IVF slow rate   · Weakness/physical deconditioning - pt deciding on placement, sw following  · PT [facility] 12/24  · Follow labs   · DVT prophylaxis  · Please see orders for further management and care. · Discharge plan: Needs [facility] - sw for assistance     Electronically signed by Efraín Stafford MD on 9/11/2021 at 4:54 AM    I can be reached through [facility].

## 2021-09-12 LAB
ALBUMIN SERPL-MCNC: 3.1 G/DL (ref 3.5–5.2)
ALP BLD-CCNC: 60 U/L (ref 35–104)
ALT SERPL-CCNC: <5 U/L (ref 0–32)
ANION GAP SERPL CALCULATED.3IONS-SCNC: 6 MMOL/L (ref 7–16)
AST SERPL-CCNC: 30 U/L (ref 0–31)
BASOPHILS ABSOLUTE: 0.08 E9/L (ref 0–0.2)
BASOPHILS RELATIVE PERCENT: 1.3 % (ref 0–2)
BILIRUB SERPL-MCNC: 0.4 MG/DL (ref 0–1.2)
BUN BLDV-MCNC: 16 MG/DL (ref 6–23)
CALCIUM SERPL-MCNC: 8.4 MG/DL (ref 8.6–10.2)
CHLORIDE BLD-SCNC: 107 MMOL/L (ref 98–107)
CO2: 22 MMOL/L (ref 22–29)
CREAT SERPL-MCNC: 1 MG/DL (ref 0.5–1)
EOSINOPHILS ABSOLUTE: 0.4 E9/L (ref 0.05–0.5)
EOSINOPHILS RELATIVE PERCENT: 6.3 % (ref 0–6)
GFR AFRICAN AMERICAN: >60
GFR NON-AFRICAN AMERICAN: 53 ML/MIN/1.73
GLUCOSE BLD-MCNC: 92 MG/DL (ref 74–99)
HCT VFR BLD CALC: 40.4 % (ref 34–48)
HEMOGLOBIN: 13 G/DL (ref 11.5–15.5)
IMMATURE GRANULOCYTES #: 0.05 E9/L
IMMATURE GRANULOCYTES %: 0.8 % (ref 0–5)
LYMPHOCYTES ABSOLUTE: 1.34 E9/L (ref 1.5–4)
LYMPHOCYTES RELATIVE PERCENT: 21.1 % (ref 20–42)
MCH RBC QN AUTO: 31.9 PG (ref 26–35)
MCHC RBC AUTO-ENTMCNC: 32.2 % (ref 32–34.5)
MCV RBC AUTO: 99.3 FL (ref 80–99.9)
MONOCYTES ABSOLUTE: 0.8 E9/L (ref 0.1–0.95)
MONOCYTES RELATIVE PERCENT: 12.6 % (ref 2–12)
NEUTROPHILS ABSOLUTE: 3.69 E9/L (ref 1.8–7.3)
NEUTROPHILS RELATIVE PERCENT: 57.9 % (ref 43–80)
ORGANISM: ABNORMAL
PDW BLD-RTO: 12.8 FL (ref 11.5–15)
PLATELET # BLD: 242 E9/L (ref 130–450)
PMV BLD AUTO: 10.3 FL (ref 7–12)
POTASSIUM REFLEX MAGNESIUM: 4.1 MMOL/L (ref 3.5–5)
RBC # BLD: 4.07 E12/L (ref 3.5–5.5)
SODIUM BLD-SCNC: 135 MMOL/L (ref 132–146)
TOTAL PROTEIN: 5.7 G/DL (ref 6.4–8.3)
URINE CULTURE, ROUTINE: ABNORMAL
WBC # BLD: 6.4 E9/L (ref 4.5–11.5)

## 2021-09-12 PROCEDURE — 96372 THER/PROPH/DIAG INJ SC/IM: CPT

## 2021-09-12 PROCEDURE — G0378 HOSPITAL OBSERVATION PER HR: HCPCS

## 2021-09-12 PROCEDURE — 96376 TX/PRO/DX INJ SAME DRUG ADON: CPT

## 2021-09-12 PROCEDURE — 2580000003 HC RX 258: Performed by: STUDENT IN AN ORGANIZED HEALTH CARE EDUCATION/TRAINING PROGRAM

## 2021-09-12 PROCEDURE — 1200000000 HC SEMI PRIVATE

## 2021-09-12 PROCEDURE — 80053 COMPREHEN METABOLIC PANEL: CPT

## 2021-09-12 PROCEDURE — 6360000002 HC RX W HCPCS: Performed by: INTERNAL MEDICINE

## 2021-09-12 PROCEDURE — 97530 THERAPEUTIC ACTIVITIES: CPT

## 2021-09-12 PROCEDURE — 85025 COMPLETE CBC W/AUTO DIFF WBC: CPT

## 2021-09-12 PROCEDURE — 2580000003 HC RX 258: Performed by: INTERNAL MEDICINE

## 2021-09-12 PROCEDURE — 36415 COLL VENOUS BLD VENIPUNCTURE: CPT

## 2021-09-12 PROCEDURE — 6360000002 HC RX W HCPCS: Performed by: STUDENT IN AN ORGANIZED HEALTH CARE EDUCATION/TRAINING PROGRAM

## 2021-09-12 PROCEDURE — 6370000000 HC RX 637 (ALT 250 FOR IP): Performed by: EMERGENCY MEDICINE

## 2021-09-12 PROCEDURE — 6370000000 HC RX 637 (ALT 250 FOR IP): Performed by: INTERNAL MEDICINE

## 2021-09-12 RX ADMIN — MICONAZOLE NITRATE: 20 POWDER TOPICAL at 09:41

## 2021-09-12 RX ADMIN — MICONAZOLE NITRATE: 2 OINTMENT TOPICAL at 09:41

## 2021-09-12 RX ADMIN — ACETAMINOPHEN 650 MG: 325 TABLET ORAL at 02:29

## 2021-09-12 RX ADMIN — WATER 1000 MG: 1 INJECTION INTRAMUSCULAR; INTRAVENOUS; SUBCUTANEOUS at 23:00

## 2021-09-12 RX ADMIN — PETROLATUM: 420 OINTMENT TOPICAL at 21:09

## 2021-09-12 RX ADMIN — PETROLATUM: 420 OINTMENT TOPICAL at 09:41

## 2021-09-12 RX ADMIN — CARBIDOPA AND LEVODOPA 1.5 TABLET: 25; 100 TABLET ORAL at 21:09

## 2021-09-12 RX ADMIN — MICONAZOLE NITRATE: 2 OINTMENT TOPICAL at 21:09

## 2021-09-12 RX ADMIN — CARBIDOPA AND LEVODOPA 1.5 TABLET: 25; 100 TABLET ORAL at 09:39

## 2021-09-12 RX ADMIN — ROPINIROLE 12 MG: 12 TABLET, FILM COATED, EXTENDED RELEASE ORAL at 09:41

## 2021-09-12 RX ADMIN — ENOXAPARIN SODIUM 40 MG: 40 INJECTION SUBCUTANEOUS at 09:40

## 2021-09-12 RX ADMIN — CARBIDOPA AND LEVODOPA 1.5 TABLET: 25; 100 TABLET ORAL at 14:16

## 2021-09-12 RX ADMIN — ACETAMINOPHEN 650 MG: 325 TABLET ORAL at 23:22

## 2021-09-12 RX ADMIN — NORTRIPTYLINE HYDROCHLORIDE 10 MG: 10 CAPSULE ORAL at 21:09

## 2021-09-12 RX ADMIN — POTASSIUM CHLORIDE AND SODIUM CHLORIDE: 900; 150 INJECTION, SOLUTION INTRAVENOUS at 19:44

## 2021-09-12 RX ADMIN — MICONAZOLE NITRATE: 20 POWDER TOPICAL at 21:09

## 2021-09-12 RX ADMIN — SODIUM CHLORIDE, PRESERVATIVE FREE 10 ML: 5 INJECTION INTRAVENOUS at 09:41

## 2021-09-12 ASSESSMENT — PAIN DESCRIPTION - ONSET
ONSET: GRADUAL
ONSET: ON-GOING

## 2021-09-12 ASSESSMENT — PAIN SCALES - GENERAL
PAINLEVEL_OUTOF10: 4
PAINLEVEL_OUTOF10: 0
PAINLEVEL_OUTOF10: 7
PAINLEVEL_OUTOF10: 0

## 2021-09-12 ASSESSMENT — PAIN DESCRIPTION - PAIN TYPE
TYPE: ACUTE PAIN
TYPE: ACUTE PAIN

## 2021-09-12 ASSESSMENT — PAIN DESCRIPTION - LOCATION
LOCATION: LEG
LOCATION: LEG

## 2021-09-12 ASSESSMENT — PAIN DESCRIPTION - DIRECTION: RADIATING_TOWARDS: LEFT GROIN

## 2021-09-12 ASSESSMENT — PAIN DESCRIPTION - ORIENTATION
ORIENTATION: LEFT
ORIENTATION: LEFT

## 2021-09-12 ASSESSMENT — PAIN DESCRIPTION - FREQUENCY
FREQUENCY: INTERMITTENT
FREQUENCY: INTERMITTENT

## 2021-09-12 ASSESSMENT — PAIN DESCRIPTION - PROGRESSION
CLINICAL_PROGRESSION: GRADUALLY WORSENING
CLINICAL_PROGRESSION: NOT CHANGED

## 2021-09-12 ASSESSMENT — PAIN - FUNCTIONAL ASSESSMENT
PAIN_FUNCTIONAL_ASSESSMENT: ACTIVITIES ARE NOT PREVENTED
PAIN_FUNCTIONAL_ASSESSMENT: PREVENTS OR INTERFERES SOME ACTIVE ACTIVITIES AND ADLS

## 2021-09-12 ASSESSMENT — PAIN DESCRIPTION - DESCRIPTORS
DESCRIPTORS: DISCOMFORT;ACHING;SHOOTING
DESCRIPTORS: ACHING;DISCOMFORT

## 2021-09-12 NOTE — PROGRESS NOTES
Internal Medicine Progress Note    Patient's name: Arlen Leiva  : 1940  Chief complaints (on day of admission): Altered Mental Status (per family ams, found in bath tub, denies falling she could not get out herself. states she was in tub 2x days)  Admission date: 2021  Date of service: 2021   Room: [address] SURG  Primary care physician: Mahi Harris DO  Reason for visit: Follow-up for UTI    Subjective  Gianna was seen and examined at bedside this morning    She is doing well and has no new complaints she continues to improve daily     Review of Systems  There are no new complaints of chest pain, shortness of breath, abdominal pain, nausea, vomiting, diarrhea, constipation.     Hospital Medications  Current Facility-Administered Medications   Medication Dose Route Frequency Provider Last Rate Last Admin    nortriptyline (PAMELOR) capsule 10 mg  10 mg Oral Nightly Christian Kurtz, DO   10 mg at 21 2112    0.9% NaCl with KCl 20 mEq infusion   IntraVENous Continuous Christian Kurtz,  mL/hr at 21 1401 New Bag at 21 1401    cefTRIAXone (ROCEPHIN) 1,000 mg in sterile water 10 mL IV syringe  1,000 mg IntraVENous Q24H Christian Gutierrezino, DO   1,000 mg at 21 2205    sodium chloride flush 0.9 % injection 10 mL  10 mL IntraVENous 2 times per day Christian Gutierrezino, DO   10 mL at 09/10/21 7080    sodium chloride flush 0.9 % injection 10 mL  10 mL IntraVENous PRN Christian Gutierrezino, DO        0.9 % sodium chloride infusion  25 mL IntraVENous PRN Christian Gutierrezino, DO        potassium chloride (KLOR-CON M) extended release tablet 40 mEq  40 mEq Oral PRN Christian Gutierrezino, DO        Or    potassium bicarb-citric acid (EFFER-K) effervescent tablet 40 mEq  40 mEq Oral PRN Christian Gutierrezino, DO        Or    potassium chloride 10 mEq/100 mL IVPB (Peripheral Line)  10 mEq IntraVENous PRN Christian Gutierrezino, DO        enoxaparin (LOVENOX) injection 40 mg  40 mg SubCUTAneous Daily Christian Gutierrezino, DO 40 mg at 09/11/21 0803    ondansetron (ZOFRAN-ODT) disintegrating tablet 4 mg  4 mg Oral Q8H PRN Christian Kurtz, DO        Or    ondansetron (ZOFRAN) injection 4 mg  4 mg IntraVENous Q6H PRN Christian Gutierrezino, DO        senna (SENOKOT) tablet 8.6 mg  1 tablet Oral Daily PRN Christian Gutierrezino, DO        acetaminophen (TYLENOL) tablet 650 mg  650 mg Oral Q6H PRN Christian Kurtz, DO   650 mg at 09/12/21 0229    Or    acetaminophen (TYLENOL) suppository 650 mg  650 mg Rectal Q6H PRN Christian Kurtz, DO        miconazole (MICOTIN) 2 % powder   Topical BID Christian Kurtz, DO   Given at 09/11/21 2031    rOPINIRole (REQUIP XL) extended release tablet 12 mg  12 mg Oral Daily with breakfast Christian Kurtz, DO   12 mg at 09/11/21 0807    carbidopa-levodopa (SINEMET)  MG per tablet 1.5 tablet  1.5 tablet Oral TID Rogers Stringer, DO   1.5 tablet at 09/11/21 2111    miconazole nitrate 2 % ointment   Topical BID Christian Kurtz, DO   Given at 09/11/21 2031    white petrolatum ointment   Topical BID Christian Kurtz, DO   Given at 09/11/21 2031       PRN Medications  sodium chloride flush, sodium chloride, potassium chloride **OR** potassium alternative oral replacement **OR** potassium chloride, ondansetron **OR** ondansetron, senna, acetaminophen **OR** acetaminophen    Objective  Most Recent Recorded Vitals  BP (!) 160/88 Comment: manual   Pulse 63   Temp 98.3 °F (36.8 °C) (Oral)   Resp 18   Ht 4' 11\" (1.499 m)   Wt 180 lb (81.6 kg)   SpO2 97%   BMI 36.36 kg/m²   I/O last 3 completed shifts:   In: [address] [P.O.:780; I.V.:1055]  Out: 1100 [Urine:1100]  I/O this shift:  In: 1100 [I.V.:1100]  Out: 900 [Urine:900]    Physical Exam:  General: AAO to person/place/time/purpose, NAD, no labored breathing  Eyes: conjunctivae/corneas clear, sclera non icteric  Skin: color/texture/turgor normal, no rashes or lesions  Lungs: CTAB, no retractions/use of accessory muscles, no vocal fremitus, no rhonchi, no crackle, no rales  Heart: regular rate, regular rhythm, no murmur  Abdomen: soft, NT, bowel sounds normal  Extremities: atraumatic, no edema  Neurologic: cranial nerves 2-12 grossly intact, no slurred speech    Most Recent Labs  Lab Results   Component Value Date    WBC 6.4 09/12/2021    HGB 13.0 09/12/2021    HCT 40.4 09/12/2021     09/12/2021     09/12/2021    K 4.1 09/12/2021     09/12/2021    CREATININE 1.0 09/12/2021    BUN 16 09/12/2021    CO2 22 09/12/2021    GLUCOSE 92 09/12/2021    ALT <5 09/12/2021    AST 30 09/12/2021    TSH 1.946 05/09/2012       CT HEAD WO CONTRAST   Final Result   No acute intracranial hemorrhage or edema. XR CHEST PORTABLE   Final Result   No evidence of pneumonia or pleural effusion. Assessment   Active Hospital Problems    Diagnosis     UTI (urinary tract infection) [N39.0]     Hypertension [I10]     At high risk for falls [Z91.81]     Hyperlipidemia [E78.5]     Degenerative joint disease involving multiple joints [M15.9]     Parkinson disease ([facility] [address] [G20]          Plan  · UTI/metabolic encephalopathy: UA noted. Urine cx pending. IV Rocephin 5 days can transition to PO upon transfer   · Dehydration, improving: IVF slow rate   · Weakness/physical deconditioning - pt deciding on placement, sw following  · PT [facility] 12/24  · Follow labs   · DVT prophylaxis  · Please see orders for further management and care. · Discharge plan: Needs [facility] - sw for assistance - medically cleared and ready for DC Monday    Electronically signed by Fadumo Hobbs MD on 9/12/2021 at 6:21 AM    I can be reached through [facility].

## 2021-09-12 NOTE — PLAN OF CARE
Problem: Falls - Risk of:  Goal: Will remain free from falls  Description: Will remain free from falls  Outcome: Met This Shift     Problem: Falls - Risk of:  Goal: Absence of physical injury  Description: Absence of physical injury  Outcome: Met This Shift     Problem: Skin Integrity:  Goal: Will show no infection signs and symptoms  Description: Will show no infection signs and symptoms  Outcome: Met This Shift     Problem: Skin Integrity:  Goal: Absence of new skin breakdown  Description: Absence of new skin breakdown  Outcome: Met This Shift     Problem: Sensory:  Goal: General experience of comfort will improve  Description: General experience of comfort will improve  Outcome: Met This Shift

## 2021-09-12 NOTE — PROGRESS NOTES
Sureprep applied to barbara heels.    Electronically signed by Carlos Cary RN on 9/11/2021 at 9:13 PM

## 2021-09-12 NOTE — PROGRESS NOTES
Physical Therapy  Facility/Department: [address] MED SURG  Daily Treatment Note  NAME: Lawrence Rhodes  : 1940  MRN: [id number]    Date of Service: 2021      Patient Diagnosis(es): The primary encounter diagnosis was Acute cystitis without hematuria. Diagnoses of Altered mental status, unspecified altered mental status type and Dehydration were also pertinent to this visit. has a past medical history of Degenerative joint disease involving multiple joints, Hyperlipidemia, Hypertension, Parkinson disease ([address] and Skin cancer. has a past surgical history that includes knee surgery (left) and Tonsillectomy and adenoidectomy. Attending Provider:  Guerline Gonzalez DO     Evaluating PT:  Ulisses Carbajal, P.T.     Room #:  0303/9344-X  Diagnosis:  UTI (urinary tract infection) [N39.0], got stuck in her whirlpool tub x2 days. She was unable to get out on her own. Pertinent PMHx/PSHx:  Parkinson's disease, DJD multiple joints  Precautions:  Falls, bed/chair alarm     SUBJECTIVE:     Pt lives alone in a 2 story home with 1 step to enter. Her bed and bath are on the first floor. Pt ambulated with a cane PTA.     OBJECTIVE:    Initial Evaluation  Date: 9/10/21 Treatment   Short Term/ Long Term   Goals   Was pt agreeable to Eval/treatment? yes  yes     Does pt have pain?  No c/o pain No c/o pain      Bed Mobility  Rolling: MAX A  Supine to sit: MAX A  Sit to supine: MAX A  Scooting: MAX A  NT - pt sitting up in the chair Independent    Transfers Sit to stand: MOD A  Stand to sit: MIN A  Stand pivot: NA Sit to stand:  SBA  Stand to sit:  SBA  Stand pivot:  NT supervision   Ambulation   7 small side steps with ww toward HOB with MOD A  90 feet with w/w Min A 150 feet with AAD with SBA   Stair negotiation: ascended and descended NA  NT 1 step with 1 rail SBA   AM-PAC 6 Clicks 69/42  86/01         Patient education  Pt educated on PT objectives during treatment session, hand placement during

## 2021-09-13 VITALS
RESPIRATION RATE: 18 BRPM | BODY MASS INDEX: 36.49 KG/M2 | WEIGHT: 181 LBS | OXYGEN SATURATION: 98 % | HEART RATE: 69 BPM | TEMPERATURE: 98.2 F | DIASTOLIC BLOOD PRESSURE: 88 MMHG | HEIGHT: 59 IN | SYSTOLIC BLOOD PRESSURE: 162 MMHG

## 2021-09-13 LAB
ALBUMIN SERPL-MCNC: 3.2 G/DL (ref 3.5–5.2)
ALP BLD-CCNC: 66 U/L (ref 35–104)
ALT SERPL-CCNC: <5 U/L (ref 0–32)
ANION GAP SERPL CALCULATED.3IONS-SCNC: 7 MMOL/L (ref 7–16)
AST SERPL-CCNC: 26 U/L (ref 0–31)
BASOPHILS ABSOLUTE: 0.06 E9/L (ref 0–0.2)
BASOPHILS RELATIVE PERCENT: 0.9 % (ref 0–2)
BILIRUB SERPL-MCNC: <0.2 MG/DL (ref 0–1.2)
BUN BLDV-MCNC: 15 MG/DL (ref 6–23)
CALCIUM SERPL-MCNC: 8.6 MG/DL (ref 8.6–10.2)
CHLORIDE BLD-SCNC: 106 MMOL/L (ref 98–107)
CO2: 23 MMOL/L (ref 22–29)
CREAT SERPL-MCNC: 0.9 MG/DL (ref 0.5–1)
EOSINOPHILS ABSOLUTE: 0.39 E9/L (ref 0.05–0.5)
EOSINOPHILS RELATIVE PERCENT: 6.1 % (ref 0–6)
GFR AFRICAN AMERICAN: >60
GFR NON-AFRICAN AMERICAN: >60 ML/MIN/1.73
GLUCOSE BLD-MCNC: 106 MG/DL (ref 74–99)
HCT VFR BLD CALC: 40.7 % (ref 34–48)
HEMOGLOBIN: 13.1 G/DL (ref 11.5–15.5)
IMMATURE GRANULOCYTES #: 0.06 E9/L
IMMATURE GRANULOCYTES %: 0.9 % (ref 0–5)
LYMPHOCYTES ABSOLUTE: 1.63 E9/L (ref 1.5–4)
LYMPHOCYTES RELATIVE PERCENT: 25.6 % (ref 20–42)
MCH RBC QN AUTO: 31.9 PG (ref 26–35)
MCHC RBC AUTO-ENTMCNC: 32.2 % (ref 32–34.5)
MCV RBC AUTO: 99 FL (ref 80–99.9)
MONOCYTES ABSOLUTE: 0.81 E9/L (ref 0.1–0.95)
MONOCYTES RELATIVE PERCENT: 12.7 % (ref 2–12)
NEUTROPHILS ABSOLUTE: 3.41 E9/L (ref 1.8–7.3)
NEUTROPHILS RELATIVE PERCENT: 53.8 % (ref 43–80)
PDW BLD-RTO: 12.9 FL (ref 11.5–15)
PLATELET # BLD: 248 E9/L (ref 130–450)
PMV BLD AUTO: 10.2 FL (ref 7–12)
POTASSIUM REFLEX MAGNESIUM: 4.6 MMOL/L (ref 3.5–5)
RBC # BLD: 4.11 E12/L (ref 3.5–5.5)
SODIUM BLD-SCNC: 136 MMOL/L (ref 132–146)
TOTAL PROTEIN: 6 G/DL (ref 6.4–8.3)
WBC # BLD: 6.4 E9/L (ref 4.5–11.5)

## 2021-09-13 PROCEDURE — 36415 COLL VENOUS BLD VENIPUNCTURE: CPT

## 2021-09-13 PROCEDURE — 80053 COMPREHEN METABOLIC PANEL: CPT

## 2021-09-13 PROCEDURE — 96372 THER/PROPH/DIAG INJ SC/IM: CPT

## 2021-09-13 PROCEDURE — 97530 THERAPEUTIC ACTIVITIES: CPT

## 2021-09-13 PROCEDURE — G0378 HOSPITAL OBSERVATION PER HR: HCPCS

## 2021-09-13 PROCEDURE — 6370000000 HC RX 637 (ALT 250 FOR IP): Performed by: EMERGENCY MEDICINE

## 2021-09-13 PROCEDURE — 6360000002 HC RX W HCPCS: Performed by: INTERNAL MEDICINE

## 2021-09-13 PROCEDURE — 97535 SELF CARE MNGMENT TRAINING: CPT

## 2021-09-13 PROCEDURE — 85025 COMPLETE CBC W/AUTO DIFF WBC: CPT

## 2021-09-13 RX ORDER — SULFAMETHOXAZOLE AND TRIMETHOPRIM 800; 160 MG/1; MG/1
1 TABLET ORAL 2 TIMES DAILY
Qty: 20 TABLET | Refills: 0 | Status: SHIPPED | OUTPATIENT
Start: 2021-09-13 | End: 2021-09-15

## 2021-09-13 RX ADMIN — ENOXAPARIN SODIUM 40 MG: 40 INJECTION SUBCUTANEOUS at 08:00

## 2021-09-13 RX ADMIN — CARBIDOPA AND LEVODOPA 1.5 TABLET: 25; 100 TABLET ORAL at 08:04

## 2021-09-13 RX ADMIN — MICONAZOLE NITRATE: 2 OINTMENT TOPICAL at 08:01

## 2021-09-13 RX ADMIN — MICONAZOLE NITRATE: 20 POWDER TOPICAL at 08:01

## 2021-09-13 RX ADMIN — ROPINIROLE 12 MG: 12 TABLET, FILM COATED, EXTENDED RELEASE ORAL at 08:01

## 2021-09-13 RX ADMIN — PETROLATUM: 420 OINTMENT TOPICAL at 08:01

## 2021-09-13 ASSESSMENT — PAIN SCALES - GENERAL: PAINLEVEL_OUTOF10: 0

## 2021-09-13 NOTE — PROGRESS NOTES
P Quality Flow/Interdisciplinary Rounds Progress Note        Quality Flow Rounds held on September 13, 2021    Disciplines Attending:  Bedside Nurse, ,  and Nursing Unit Leadership    Tello Felton was admitted on 9/9/2021  7:42 PM    Anticipated Discharge Date:  Expected Discharge Date: 09/14/21    Disposition:    Enrrique Score:  Enrrique Scale Score: 16    Readmission Risk              Risk of Unplanned Readmission:  11           Discussed patient goal for the day, patient clinical progression, and barriers to discharge.   The following Goal(s) of the Day/Commitment(s) have been identified:  Diagnostics - Report Results and Labs - Report Results, discharge planning      Maicol Adame RN  September 13, 2021

## 2021-09-13 NOTE — DISCHARGE SUMMARY
Internal Medicine Discharge Summary    NAME: Shuan Dominguez :  1940  MRN:  [id number] PCP:Lamine Rodriguez DO    ADMITTED: 2021   DISCHARGED: 2021  1:17 PM    ADMITTING PHYSICIAN: Liudmila att. providers found    PCP: Nahomy Ellsworth DO    CONSULTANT(S):   IP CONSULT TO SOCIAL WORK  IP CONSULT TO HOME CARE NEEDS     ADMITTING DIAGNOSIS:   UTI (urinary tract infection) [N39.0]     Please see H&P for further details    DISCHARGE DIAGNOSES:   Active Hospital Problems    Diagnosis     UTI (urinary tract infection) [N39.0]     Hypertension [I10]     At high risk for falls [Z91.81]     Hyperlipidemia [E78.5]     Degenerative joint disease involving multiple joints [M15.9]     Parkinson disease ([address] [G20]        BRIEF HISTORY OF PRESENT ILLNESS: Shaun Dominguez is a [de-identified] y.o. female patient of Nahomy Ellsworth DO who  has a past medical history of Degenerative joint disease involving multiple joints, Hyperlipidemia, Hypertension, Parkinson disease ([address] and Skin cancer. who originally had concerns including Altered Mental Status (per family ams, found in bath tub, denies falling she could not get out herself. states she was in tub 2x days). at presentation on 2021, and was found to have UTI (urinary tract infection) [N39.0] after workup. Please see H&P for further details. HOSPITAL COURSE:   The patient presented to the hospital with the chief complaint of Altered Mental Status (per family ams, found in bath tub, denies falling she could not get out herself. states she was in tub 2x days)  . The patient was admitted to the hospital.     · UTI/metabolic encephalopathy: UA noted. Urine cx pending.  IV Rocephin 5 days can transition to PO upon transfer - bactrim   · Dehydration, improving: IVF off  · Weakness/physical deconditioning - [facility]  PT [address]       BRIEF PHYSICAL EXAMINATION AND LABORATORIES ON DAY OF DISCHARGE:  VITALS:  BP (!) 162/88   Pulse 69   Temp 98.2 °F (36.8 °C) (Oral)   Resp 18 FINDINGS: No evidence of acute intracranial hemorrhage or edema. No abnormal extra-axial fluid collections. There is prominence of sulci, cisterns and ventricles related age-appropriate parenchymal volume loss. Areas of hypoattenuation are seen in periventricular and subcortical white matter are suggestive of areas of chronic microvascular ischemia. No evidence of depressed calvarial fracture. No acute intracranial hemorrhage or edema. XR CHEST PORTABLE    Result Date: 9/9/2021  EXAMINATION: ONE XRAY VIEW OF THE CHEST 9/9/2021 8:13 pm COMPARISON: None. HISTORY: ORDERING SYSTEM PROVIDED HISTORY: generalized weakness TECHNOLOGIST PROVIDED HISTORY: Reason for exam:->generalized weakness FINDINGS: Opacities are seen in peripheral aspect of mid right lung field likely related to subsegmental atelectasis or scarring. No airspace opacity or pleural effusion. The heart is normal size. No pneumothorax. No evidence of pneumonia or pleural effusion. MICROBIOLOGY:  BLOOD CX #1  No results for input(s): BC in the last 72 hours. BLOOD CX #2  No results for input(s): Ana Laura Patsy in the last 72 hours. TIP CULTURE  No results for input(s): CXCATHTIP in the last 72 hours. CULTURE, RESPIRATORY   No results for input(s): CULTRESP in the last 72 hours. RESPIRATORY SMEAR  No results for input(s): RESPSMEAR in the last 72 hours. ECHO:      DISPOSITION:  The patient's condition is fair. The patient is being discharged to home    DISCHARGE MEDICATIONS:    Efrain Diehl   Home Medication Instructions WUX:[id number]    Printed on:09/13/21 [id number]   Medication Information                      carbidopa-levodopa (SINEMET)  MG per tablet  Take 1.5 tablets by mouth 3 times daily              Handicap Placard MISC  by Does not apply route             miconazole (MICOTIN) 2 % powder  Apply topically 2 times daily.              nortriptyline (PAMELOR) 10 MG capsule  Take 10 mg by mouth nightly ropinirole (REQUIP) 1 MG tablet  Take 12 mg by mouth every morning (before breakfast)              sulfamethoxazole-trimethoprim (BACTRIM DS;SEPTRA DS) 800-160 MG per tablet  Take 1 tablet by mouth 2 times daily for 2 days             white petrolatum OINT ointment  Apply topically 2 times daily                 Discharge Medication List as of 9/13/2021 12:34 PM        Discharge Medication List as of 9/13/2021 12:34 PM        Discharge Medication List as of 9/13/2021 12:34 PM      START taking these medications    Details   miconazole (MICOTIN) 2 % powder Apply topically 2 times daily. , Disp-45 g, R-0, Normal      white petrolatum OINT ointment Apply topically 2 times daily, Topical, 2 TIMES DAILY Starting Mon 9/13/2021, Disp-500 g, R-0, Normal      sulfamethoxazole-trimethoprim (BACTRIM DS;SEPTRA DS) 800-160 MG per tablet Take 1 tablet by mouth 2 times daily for 2 days, Disp-20 tablet, R-0Normal             INTERNAL MEDICINE FOLLOW UP/INSTRUCTIONS:  · Follow-up with primary care physician as directed in discharge paperwork. · Please review results of imaging studies with PCP. · Follow-up with consultants as directed by them. · If recurrence or worsening of symptoms go to the ED or call primary care physician. · Diet: ADULT DIET; Regular    Preparing for this patient's discharge, including paperwork, orders, instructions, and meeting with patient did required >35 minutes.     Electronically signed by Pao Cid MD on 9/13/2021 at 2:41 PM

## 2021-09-13 NOTE — CARE COORDINATION
Social Work discharge planning   Pt advised that her cousin will be staying with her at home so she is not alone. ESEQUIEL spoke to dtr jacob as well, who advised the cousin from [place] is a retired rehab nurse, and will be staying with pt for a week at discharge. Dtr aware discharge could be as early a today. Pt's PT AMPAC improved on 9/11 to 15/24. Pt wiill need DME order for [employer] and HHC order fr home PT OT, wound care, home aide, Cp Assess, med compliance please. No snf at this time. They chose 1. [facility] (family friends work there) and NO dme pref for ww. Electronically signed by Sherry Romberg on 9/13/2021 at 9:00 AM     Addendum-    Esequiel spoke to [facility] [phone], who confirmed family friends Opal Shaffer and Jono Cedeno work in their [employer] office. Thus, this is correct hhc choice family made. Esequiel faxed referral to Argentina Estrada at [facility], and await to see IF they take pt's insurance or not. Also, Esequiel called referral to [place] with [facility] dme for new ww. Esequiel notified Dr Patricia Cloud of need for hhc order. Electronically signed by Sherry Romberg on 9/13/2021 at 9:41 AM     Addendum-    ESEQUIEL met with pt and dtr Dorita Guo in room. They are in agreement with discharge today IF [facility] can be set up. If not, [facility]nd hhc [facility] A[facility] once insurance is verified. Electronically signed by Sherry Romberg on 9/13/2021 at 10:35 AM     ADDENDUM-    Per Argentina Estrada at [facility], they have accepted pt for hhc for discharge today. Esequiel faxed hhc order to [place] Estrada, who said he will check with family's nurse request Aneta Chvaez) and let pt kn ow when hhc will see pt tomorrow. Esequiel notified [address] is set up for discharge today. Electronically signed by Sherry Romberg on 9/13/2021 at 11:00 AM     Addendum -    SW gave pt list of  aide private duty agencies. Pt is aware that Argentina Estrada at [facility] advised they do not have aide services.   Pt said she has a family member spending tonight with her as well until cousin is here tomorrow. The Plan for Transition of Care is related to the following treatment goals: home with 24 hour care, ww and hhc    The Patient and daughters were provided with a choice of provider and agrees with the discharge plan. [x] Yes [] No    Freedom of choice list was provided with basic dialogue that supports the patient's individualized plan of care/goals, treatment preferences and shares the quality data associated with the providers.  [x] Yes [] No

## 2021-09-13 NOTE — PROGRESS NOTES
Occupational Therapy  OT BEDSIDE TREATMENT NOTE      Date:2021  Patient Name: Clifton Han  MRN: [id number]  : 1940  Room: [address] [place], [place] [address]     Evaluating OT: Ramonia Hiss L. Dennie Quince, YULISSA/L - [id number]     Referring Provider: Kevyn Roman DO  Specific Provider Orders/Date: \"OT eval and treat\" - 9/10/2021     Diagnosis: UTI (urinary tract infection) [N39.0]                Patient presented to ED s/p being unable to get out of her jacuzzi tub for two days.     Pertinent Medical History: Parkinson's disease, skin cancer, HTN, DJD      Precautions: fall risk, bed/chair alarms, skin integrity     Assessment of Current Deficits:    [x]? Functional mobility             [x]?ADLs           [x]? Strength                  [x]? Cognition   [x]? Functional transfers           [x]? IADLs         [x]? Safety Awareness   [x]? Endurance   []? Fine Coordination              [x]? Balance      []? Vision/perception   [x]? Sensation     []? Gross Motor Coordination  []? ROM           []? Delirium                   []? Motor Control      OT PLAN OF CARE   OT POC is based on physician orders, patient diagnosis, and results of clinical assessment.   Frequency/Duration 2-5 days/week for 2 weeks PRN   Specific OT Treatment Interventions to Include:   * Instruction/training on adapted ADL techniques and AE recommendations to increase functional independence within precautions       * Training on energy conservation strategies, correct breathing pattern and techniques to improve independence/tolerance for self-care routine  * Functional transfer/mobility training/DME recommendations for increased independence, safety, and fall prevention  * Patient/Family education to increase follow through with safety techniques and functional independence  * Recommendation of environmental modifications for increased safety with functional transfers/mobility and ADLs  * Therapeutic exercise to improve motor endurance, ROM, and functional strength for ADLs/functional transfers  * Therapeutic activities to facilitate/challenge dynamic balance, stand tolerance for increased safety and independence with ADLs  * Neuro-muscular re-education: facilitation of righting/equilibrium reactions, midline orientation, scapular stability/mobility, normalization of muscle tone, and facilitation of volitional active controled movement  * Positioning to improve skin integrity, interaction with environment and functional independence     Recommended Adaptive Equipment: TBD     Home Living: Patient lives alone in a two-floor home; patient's bedroom and bathroom are on the main living level. Laundry is on the main living level. Bathroom Setup: walk-in shower (with seat, grab bars, and handheld shower head), jacuzzi tub (which patient does not typically use)  Equipment Owned: cane     Prior Level of Function (PLOF): Patient was independent with ADLs, IADLs, and functional mobility (with use of cane within the community) prior to this hospitalization. Driving: Yes     Pain Level: No c/o pain  Cognition: Patient alert and oriented grossly     Functional Assessment:                  AM-PAC Daily Activity Raw Score: 18/24    Initial Eval Status  Date: 9/10/2021 Treatment Status  Date: 9/13/21 Short Term Goals = Long Term Goals   Feeding Setup, SBA, and increased time needed with lunch tray items. Luann Salinas noted.       Grooming Min A CGA  Standing at sink  Mod I  (seated/standign at sink)   UB Dressing Min A   Setup   LB Dressing Max A Min progressing to SBA    Min A - with use of AE, as needed/appropriate   Bathing Max A   Min A - with use of AE/DME, as needed/appropriate   Toileting Max A   Min A   Bed Mobility  Supine-to-Sit: Min A  Supine to sit: Min A      Functional Transfers Sit-to-Stand: Min A   from EOB and bedside chair  Cues needed to maximize safety.  STS: CGA  From commode and EOB Independent   Functional Mobility Min A   (with walker) within patient's room and bathroom. Cues needed occasionally to maximize safety with management of walker. CGA using ww in room  Mod I with functional mobility (with device, as needed/appropriate) in order to maximize independence with ADLs/IADLs and other functional tasks. Balance Sitting: Good  (at EOB)  Standing: Fair-  (with walker)  Sitting: independent  Standing: CGA Fair+ dynamic standing balance during completion of ADLs/IADLs and other functional tasks. Activity Tolerance Fair  Fair Patient will demonstrate Good understanding and consistent implementation of energy conservation techniques and work simplification techniques into ADL/IADL routines. Visual/  Perceptual WFL grossly      N/A   B UE Strength 3+/5 Functional use of B UE demonstrated during tx  Patient will demonstrate 4/5 B UE strength in order to maximize independence with ADLs/IADLs and functional transfers.            Treatment: Upon arrival pt was supine in bed. Educated pt on use of reacher/ sock aid to manage LE clothing. Per pt she has reacher. Sock aid issued during tx. At end of session pt was transferred to chair with alarm on, all lines and tubes intact and call light within reach. Education: Transfer training, ADL safety and AE management    · Pt has made good progress towards set goals.          Treatment Charges: Mins Units   Ther Ex  [id number]     Manual Therapy Chiquis Kumari [phone]   ADL/Home Mgt [id number] [phone]   Neuro Re-ed [id number]     Group Therapy      Orthotic manage/training  [id number]     Non-Billable Time         Total Time: [address] LANDERS/L [id number]

## 2021-09-13 NOTE — PROGRESS NOTES
heel   pressure injuries heels,POA and a Stage 2 sacral ulcer,POA. If possible,   please document in progress notes and discharge summary the type of skin   injury, site of the skin injuries and present on admission status of the skin   injuries: The medical record reflects the following:  Risk Factors: in a whirlpool tub x 2 days due to unable to get out on her own   ,advanced age, Parkinsons, Bruise left buttocks 6x4  Clinical Indicators: Per Wound care RN  9/10,\". .. Bilateral stage 1 pressure   injuries heels Stage 2 sacral ulcer  IAD, Small areas torn skin rt posterior shoulder . Donn Arcos \"  Treatment: Lo air loss Sure prep to heels aquaphor to buttocks antifungal to   perianal area    Thank you,  Clinton Salter RN CCDS  Clinical Documentation Improvement Specialist  [phone]  Options provided:  -- Decubitus Pressure Injury to the right heel present on admission  -- Decubitus Pressure Injury to the right heel not present on admission  -- Other - I will add my own diagnosis  -- Disagree - Not applicable / Not valid  -- Disagree - Clinically unable to determine / Unknown  -- Refer to Clinical Documentation Reviewer    PROVIDER RESPONSE TEXT:    This patient has a decubitus pressure Injury to the right heel that was   present on admission. Query created by: Janessa Milton on 9/10/2021 2:17 PM      QUERY TEXT:    Dear Attending Physician,    Patient admitted with UTI and weakness, unable to get out of the tub x 2 days. Per Wound care RN 9/10 , noted to also have left and right Stage 1 heel   pressure injuries heels,POA and a Stage 2 sacral ulcer,POA. If possible,   please document in progress notes and discharge summary the type of skin   injury, site of the skin injuries and present on admission status of the skin   injuries:     The medical record reflects the following:  Risk Factors: in a whirlpool tub x 2 days due to unable to get out on her own   ,advanced age, Parkinsons, Bruise left buttocks 6x4  Clinical Indicators: Per Wound care RN  9/10,\". .. Bilateral stage 1 pressure   injuries heels Stage 2 sacral ulcer  IAD, Small areas torn skin rt posterior shoulder . [place] Glow \"  Treatment: Lo air loss Sure prep to heels aquaphor to buttocks antifungal to   perianal area    Thank you,  Kenzie Villegas RN [facility]  Clinical Documentation Improvement Specialist  [phone]  Options provided:  -- Decubitus Pressure ulcer to the sacrum present on admission  -- Decubitus Pressure ulcer to the sacrum not present on admission  -- Other - I will add my own diagnosis  -- Disagree - Not applicable / Not valid  -- Disagree - Clinically unable to determine / Unknown  -- Refer to Clinical Documentation Reviewer    PROVIDER RESPONSE TEXT:    This patient has a decubitus pressure ulcer to the sacrum that was present on   admission.     Query created by: Ranjith Aranda on 9/10/2021 2:19 PM      Electronically signed by:  Alley Cantu DO 9/13/2021 6:41 AM

## 2021-09-13 NOTE — PROGRESS NOTES
Internal Medicine Progress Note    Patient's name: Pedro Kilgore  : 1940  Chief complaints (on day of admission): Altered Mental Status (per family ams, found in bath tub, denies falling she could not get out herself. states she was in tub 2x days)  Admission date: 2021  Date of service: 2021   Room: [address] SURG  Primary care physician: Heaven Chapman DO  Reason for visit: Follow-up for UTI    Subjective  Gianna was seen and examined at bedside this morning    Daughter present, plan discussed for dc home with [facility] all questions answered     Review of Systems  There are no new complaints of chest pain, shortness of breath, abdominal pain, nausea, vomiting, diarrhea, constipation.     Hospital Medications  Current Facility-Administered Medications   Medication Dose Route Frequency Provider Last Rate Last Admin    nortriptyline (PAMELOR) capsule 10 mg  10 mg Oral Nightly Christian Kurtz, DO   10 mg at 21    0.9% NaCl with KCl 20 mEq infusion   IntraVENous Continuous Christian Kurtz,  mL/hr at 21 Rate Verify at 21    cefTRIAXone (ROCEPHIN) 1,000 mg in sterile water 10 mL IV syringe  1,000 mg IntraVENous Q24H Jaymie Perez MD   1,000 mg at 21 2300    sodium chloride flush 0.9 % injection 10 mL  10 mL IntraVENous 2 times per day Christian Kurtz, DO   10 mL at 21 0941    sodium chloride flush 0.9 % injection 10 mL  10 mL IntraVENous PRN Christian Kurtz, DO        0.9 % sodium chloride infusion  25 mL IntraVENous PRN Christian Gutierrezino, DO        potassium chloride (KLOR-CON M) extended release tablet 40 mEq  40 mEq Oral PRN Christian Kurtz, DO        Or    potassium bicarb-citric acid (EFFER-K) effervescent tablet 40 mEq  40 mEq Oral PRN Christian Gutierrezino, DO        Or    potassium chloride 10 mEq/100 mL IVPB (Peripheral Line)  10 mEq IntraVENous PRN Christian Gutierrezino, DO        enoxaparin (LOVENOX) injection 40 mg  40 mg SubCUTAneous Daily Christian Kurtz DO   40 mg at 09/12/21 0940    ondansetron (ZOFRAN-ODT) disintegrating tablet 4 mg  4 mg Oral Q8H PRN Christian Kurtz, DO        Or    ondansetron (ZOFRAN) injection 4 mg  4 mg IntraVENous Q6H PRN Christian Gutierrezino, DO        senna (SENOKOT) tablet 8.6 mg  1 tablet Oral Daily PRN Christian Gutierrezino, DO        acetaminophen (TYLENOL) tablet 650 mg  650 mg Oral Q6H PRN Christian Kurtz, DO   650 mg at 09/12/21 2322    Or    acetaminophen (TYLENOL) suppository 650 mg  650 mg Rectal Q6H PRN Christian Gutierrezino, DO        miconazole (MICOTIN) 2 % powder   Topical BID Christian Kurtz, DO   Given at 09/12/21 2109    rOPINIRole (REQUIP XL) extended release tablet 12 mg  12 mg Oral Daily with breakfast Christian Kurtz, DO   12 mg at 09/12/21 0941    carbidopa-levodopa (SINEMET)  MG per tablet 1.5 tablet  1.5 tablet Oral TID Delonte Pereira, DO   1.5 tablet at 09/12/21 2109    miconazole nitrate 2 % ointment   Topical BID Christian Kurtz, DO   Given at 09/12/21 2109    white petrolatum ointment   Topical BID Christian Kurtz, DO   Given at 09/12/21 2109       PRN Medications  sodium chloride flush, sodium chloride, potassium chloride **OR** potassium alternative oral replacement **OR** potassium chloride, ondansetron **OR** ondansetron, senna, acetaminophen **OR** acetaminophen    Objective  Most Recent Recorded Vitals  BP (!) 152/90   Pulse 72   Temp 97.4 °F (36.3 °C) (Oral)   Resp 16   Ht 4' 11\" (1.499 m)   Wt 181 lb (82.1 kg)   SpO2 97%   BMI 36.56 kg/m²   I/O last 3 completed shifts: In: 3036 [P.O.:840; I.V.:2196]  Out: 1600 [Urine:1600]  No intake/output data recorded.     Physical Exam:  General: AAO to person/place/time/purpose, NAD, no labored breathing  Eyes: conjunctivae/corneas clear, sclera non icteric  Skin: color/texture/turgor normal, no rashes or lesions  Lungs: CTAB, no retractions/use of accessory muscles, no vocal fremitus, no rhonchi, no crackle, no rales  Heart: regular rate, regular rhythm, no murmur  Abdomen: soft, NT, bowel sounds normal  Extremities: atraumatic, no edema  Neurologic: cranial nerves 2-12 grossly intact, no slurred speech    Most Recent Labs  Lab Results   Component Value Date    WBC 6.4 09/13/2021    HGB 13.1 09/13/2021    HCT 40.7 09/13/2021     09/13/2021     09/13/2021    K 4.6 09/13/2021     09/13/2021    CREATININE 0.9 09/13/2021    BUN 15 09/13/2021    CO2 23 09/13/2021    GLUCOSE 106 (H) 09/13/2021    ALT <5 09/13/2021    AST 26 09/13/2021    TSH 1.946 05/09/2012       CT HEAD WO CONTRAST   Final Result   No acute intracranial hemorrhage or edema. XR CHEST PORTABLE   Final Result   No evidence of pneumonia or pleural effusion. Assessment   Active Hospital Problems    Diagnosis     UTI (urinary tract infection) [N39.0]     Hypertension [I10]     At high risk for falls [Z91.81]     Hyperlipidemia [E78.5]     Degenerative joint disease involving multiple joints [M15.9]     Parkinson disease ([facility] [address] [G20]          Plan  · UTI/metabolic encephalopathy: UA noted. Urine cx pending. IV Rocephin 5 days can transition to PO upon transfer   · Dehydration, improving: IVF off  · Weakness/physical deconditioning - hhc  · PT [facility] 12/24  · Follow labs   · DVT prophylaxis  · Please see orders for further management and care. · Discharge plan: DC home with [facility]    Electronically signed by Abhilash Sainz MD on 9/13/2021 at 7:24 AM    I can be reached through [facility].

## 2021-09-14 ENCOUNTER — TELEPHONE (OUTPATIENT)
Dept: FAMILY MEDICINE CLINIC | Age: 81
End: 2021-09-14

## 2021-09-14 ENCOUNTER — CARE COORDINATION (OUTPATIENT)
Dept: CARE COORDINATION | Age: 81
End: 2021-09-14

## 2021-09-14 NOTE — CARE COORDINATION
[facility] Transitions Initial Follow Up Call    Call within 2 business days of discharge: Yes    Patient: Sonia Palomares Patient : 1940   MRN: <[id number]>  Reason for Admission: -21 Acute Cystitis w/o Hematuria; Altered Mental Status; Dehydration  Discharge Date: 21 RARS: Readmission Risk Score: 11      Last Discharge [address] [address]       Complaint Diagnosis Description Type Department Provider    21 Altered Mental Status Acute cystitis without hematuria . .. ED to Hosp-Admission (Discharged) (ADMITTED) MARIO ALBERT[facility] 5W Shari Fong MD; Luis Camarena. .. Spoke with: Attempted to contact patient for Initial Care Transition call. Left HIPPA compliant message requesting return call. Will attempt to reach again. Nini Hooks LPN  [facility] / [address]  [phone]      Facility: [facility]    Non-face-to-face services provided:      Care Transitions 24 Hour Call    Care Transitions Interventions         Follow Up  Future Appointments   Date Time Provider Blanca Barraza   9/15/2021  2:00 PM [address],  [address], LPN

## 2021-09-14 NOTE — TELEPHONE ENCOUNTER
----- Message from [facility] sent at 9/13/2021  8:04 AM EDT -----  Subject: Message to Provider    QUESTIONS  Information for Provider? Caller is the daughter Gordo Hi) of patient. Luca King would like to speak to Dr. Robertha Homans to discuss home care for mother   due to recent incident where patient wasn't able to get out of bathtub. Currently at [facility] where they would like to have her placed in   nursing home.   ---------------------------------------------------------------------------  --------------  [address]  What is the best way for the office to contact you? OK to leave message on   voicemail  Preferred Call Back Phone Number? [phone]  ---------------------------------------------------------------------------  --------------  SCRIPT ANSWERS  Relationship to Patient? Other  Representative Name? Luca King  Is the Representative on the appropriate HIPAA document in Epic?  Yes

## 2021-09-15 ENCOUNTER — OFFICE VISIT (OUTPATIENT)
Dept: FAMILY MEDICINE CLINIC | Age: 81
End: 2021-09-15
Payer: MEDICARE

## 2021-09-15 ENCOUNTER — CARE COORDINATION (OUTPATIENT)
Dept: CARE COORDINATION | Age: 81
End: 2021-09-15

## 2021-09-15 VITALS
WEIGHT: 155 LBS | HEIGHT: 59 IN | BODY MASS INDEX: 31.25 KG/M2 | SYSTOLIC BLOOD PRESSURE: 132 MMHG | DIASTOLIC BLOOD PRESSURE: 80 MMHG | TEMPERATURE: 97.5 F | OXYGEN SATURATION: 98 % | HEART RATE: 58 BPM | RESPIRATION RATE: 16 BRPM

## 2021-09-15 DIAGNOSIS — M15.9 PRIMARY OSTEOARTHRITIS INVOLVING MULTIPLE JOINTS: ICD-10-CM

## 2021-09-15 DIAGNOSIS — G20 PARKINSON DISEASE (HCC): ICD-10-CM

## 2021-09-15 DIAGNOSIS — E86.0 DEHYDRATION: ICD-10-CM

## 2021-09-15 DIAGNOSIS — Z00.00 ROUTINE GENERAL MEDICAL EXAMINATION AT A HEALTH CARE FACILITY: Primary | ICD-10-CM

## 2021-09-15 DIAGNOSIS — I10 ESSENTIAL HYPERTENSION: Primary | ICD-10-CM

## 2021-09-15 DIAGNOSIS — R41.0 ACUTE DELIRIUM: ICD-10-CM

## 2021-09-15 DIAGNOSIS — N39.0 URINARY TRACT INFECTION WITHOUT HEMATURIA, SITE UNSPECIFIED: ICD-10-CM

## 2021-09-15 LAB
BLOOD CULTURE, ROUTINE: NORMAL
CULTURE, BLOOD 2: NORMAL

## 2021-09-15 PROCEDURE — G0439 PPPS, SUBSEQ VISIT: HCPCS | Performed by: FAMILY MEDICINE

## 2021-09-15 PROCEDURE — 3288F FALL RISK ASSESSMENT DOCD: CPT | Performed by: FAMILY MEDICINE

## 2021-09-15 PROCEDURE — 99214 OFFICE O/P EST MOD 30 MIN: CPT | Performed by: FAMILY MEDICINE

## 2021-09-15 SDOH — ECONOMIC STABILITY: FOOD INSECURITY: WITHIN THE PAST 12 MONTHS, THE FOOD YOU BOUGHT JUST DIDN'T LAST AND YOU DIDN'T HAVE MONEY TO GET MORE.: PATIENT DECLINED

## 2021-09-15 SDOH — ECONOMIC STABILITY: FOOD INSECURITY: WITHIN THE PAST 12 MONTHS, YOU WORRIED THAT YOUR FOOD WOULD RUN OUT BEFORE YOU GOT MONEY TO BUY MORE.: PATIENT DECLINED

## 2021-09-15 ASSESSMENT — LIFESTYLE VARIABLES
HOW OFTEN DO YOU HAVE A DRINK CONTAINING ALCOHOL: 0
HOW OFTEN DO YOU HAVE A DRINK CONTAINING ALCOHOL: NEVER

## 2021-09-15 ASSESSMENT — PATIENT HEALTH QUESTIONNAIRE - PHQ9
2. FEELING DOWN, DEPRESSED OR HOPELESS: 0
2. FEELING DOWN, DEPRESSED OR HOPELESS: 0
SUM OF ALL RESPONSES TO PHQ QUESTIONS 1-9: 0
1. LITTLE INTEREST OR PLEASURE IN DOING THINGS: 0
SUM OF ALL RESPONSES TO PHQ9 QUESTIONS 1 & 2: 0
SUM OF ALL RESPONSES TO PHQ9 QUESTIONS 1 & 2: 0
1. LITTLE INTEREST OR PLEASURE IN DOING THINGS: 0
SUM OF ALL RESPONSES TO PHQ QUESTIONS 1-9: 0

## 2021-09-15 ASSESSMENT — SOCIAL DETERMINANTS OF HEALTH (SDOH): HOW HARD IS IT FOR YOU TO PAY FOR THE VERY BASICS LIKE FOOD, HOUSING, MEDICAL CARE, AND HEATING?: PATIENT DECLINED

## 2021-09-15 NOTE — PROGRESS NOTES
Medicare Annual Wellness Visit  Name: Johnny Ribeiro Date: 9/15/2021   MRN: [id number] Sex: Female   Age: [de-identified] y.o. Ethnicity: Non- / Non    : 1940 Race: White (non-)      Kenzie Grewal is here for Medicare AWV    Screenings for behavioral, psychosocial and functional/safety risks, and cognitive dysfunction are all negative except as indicated below. These results, as well as other patient data from the [address] form, are documented in Flowsheets linked to this Encounter. No Known Allergies      Prior to Visit Medications    Medication Sig Taking? Authorizing Provider   miconazole (MICOTIN) 2 % powder Apply topically 2 times daily.   Stefano Morataya MD   white petrolatum OINT ointment Apply topically 2 times daily  Patient not taking: Reported on 9/15/2021  Stefano Morataya MD   sulfamethoxazole-trimethoprim (BACTRIM DS;SEPTRA DS) 800-160 MG per tablet Take 1 tablet by mouth 2 times daily for 2 days  Stefano Morataya MD   Handicap Placard MISC by Does not apply route  Patient not taking: Reported on 9/15/2021  Historical Provider, MD   carbidopa-levodopa (SINEMET)  MG per tablet Take 1.5 tablets by mouth 3 times daily   Historical Provider, MD   ropinirole (REQUIP) 1 MG tablet Take 12 mg by mouth every morning (before breakfast)   Historical Provider, MD   nortriptyline (PAMELOR) 10 MG capsule Take 10 mg by mouth nightly   Historical Provider, MD         Past Medical History:   Diagnosis Date    Degenerative joint disease involving multiple joints     Hyperlipidemia     Hypertension     Parkinson disease ([facility] [address]     Skin cancer     scc       Past Surgical History:   Procedure Laterality Date    KNEE SURGERY  left    TONSILLECTOMY AND ADENOIDECTOMY           Family History   Problem Relation Age of Onset    No Known Problems Mother     No Known Problems Father        CareTeam (Including outside providers/suppliers regularly involved in providing care):   Patient Care Team:  Brenda Piña DO as PCP - General (Family Medicine)  Brenda Piña DO as PCP - [facility][1] Empaneled Provider  Peng Spence LPN as Care Transitions Nurse    Wt Readings from Last 3 Encounters:   09/15/21 155 lb (70.3 kg)   09/13/21 181 lb (82.1 kg)   06/25/20 159 lb (72.1 kg)     There were no vitals filed for this visit. There is no height or weight on file to calculate BMI. Based upon direct observation of the patient, evaluation of cognition reveals recent and remote memory intact. Patient's complete Health Risk Assessment and screening values have been reviewed and are found in Flowsheets. The following problems were reviewed today and where indicated follow up appointments were made and/or referrals ordered. Positive Risk Factor Screenings with Interventions:           Health Habits/Nutrition:  Health Habits/Nutrition  Do you exercise for at least 20 minutes 2-3 times per week?: Yes  Have you lost any weight without trying in the past 3 months?: No  Do you eat only one meal per day?: No  Have you seen the dentist within the past year?: (!) No     Health Habits/Nutrition Interventions:  · Dental exam overdue:  patient encouraged to make appointment with his/her dentist     Safety:  Safety  Do you have working smoke detectors?: (!) No  Have all throw rugs been removed or fastened?: Yes  Do you have non-slip mats or surfaces in all bathtubs/showers?: (!) No  Do all of your stairways have a railing or banister?: (!) No  Are your doorways, halls and stairs free of clutter?: Yes  Do you always fasten your seatbelt when you are in a car?: Yes  Safety Interventions:  · Home safety tips provided    ADL:  ADLs  In the past 7 days, did you need help from others to perform any of the following everyday activities?  Eating, dressing, grooming, bathing, toileting, or walking/balance?: (!) Eating, Dressing, Grooming, Bathing, Toileting, Walking/Balance  In the past 7 days, did you need help from others to take care of any of the following? Laundry, housekeeping, banking/finances, shopping, telephone use, food preparation, transportation, or taking medications?: [employer], Housekeeping, Banking/Finances, Shopping, Telephone Use, Food Preparation, Transportation, Taking Medications  ADL Interventions:  · Patient declines any further evaluation/treatment for this issue    Personalized Preventive Plan   Current Health Maintenance Status  Immunization History   Administered Date(s) Administered    COVID-19, Moderna, PF, 100mcg/0.5mL 01/28/2021, 02/25/2021        Health Maintenance   Topic Date Due    DTaP/Tdap/Td vaccine (1 - Tdap) Never done    Shingles Vaccine (1 of 2) Never done    DEXA (modify frequency per FRAX score)  Never done    Pneumococcal 65+ years Vaccine (1 of 1 - PPSV23) Never done   [employer] Visit (AWV)  Never done    Flu vaccine (1) Never done    COVID-19 Vaccine  Completed    Hepatitis A vaccine  Aged Out    Hepatitis B vaccine  Aged Out    Hib vaccine  Aged Out    Meningococcal (ACWY) vaccine  Aged Out     Recommendations for TalkTo Due: see orders and patient instructions/AVS.  . Recommended screening schedule for the next 5-10 years is provided to the patient in written form: see Patient Fabricio Holly was seen today for medicare awv. Diagnoses and all orders for this visit:    Routine general medical examination at a health care facility  1 year                 Cardiovascular Disease Risk Counseling: Assessed the patient's risk to develop cardiovascular disease and reviewed main risk factors.    Reviewed steps to reduce disease risk including:   · Quitting tobacco use, reducing amount smoked, or not starting the habit  · Making healthy food choices  · Being physically active and gradualy increasing activity levels   · Reduce weight and determine a healthy BMI goal  · Monitor blood pressure and treat if higher than 140/90 mmHg  · Maintain blood total cholesterol levels under 5 mmol/l or 190 mg/dl  · Maintain LDL cholesterol levels under 3.0 mmol/l or 115 mg/dl   · Control blood glucose levels  · Consider taking aspirin (75 mg daily), once blood pressure is controlled   Provided a follow up plan.   Time spent (minutes): 5 min

## 2021-09-15 NOTE — PROGRESS NOTES
9/15/21  [place] Foil : 1940 Sex: female  Age: [de-identified] y.o. Assessment and Plan:  Kaden Reilly was seen today for follow-up from hospital and medication problem. Diagnoses and all orders for this visit:    Essential hypertension  Blood pressure has remained under good control throughout her hospitalization and needed post hospital course. Parkinson disease ([address]  This is stabilized once her she was back on medications. However,, the neurologic component of parkinsonism no doubt contributed to her delirium. Primary osteoarthritis involving multiple joints    Urinary tract infection without hematuria, site unspecified  Appropriately treated and resolving. Acute delirium  This is multifactorial in nature. Parkinson's contributed somewhat to this, dehydration from being trapped in the tub for 2 days, Gretchen Leader tract infection which developed in the interim as well. For appropriate treatment in the hospital setting with fluid supplementation antibiotics gradually return to baseline cognition. Dehydration  This was treated with intravenous therapy while hospitalized and has resolved. Return in about 4 weeks (around 10/13/2021). Chief Complaint   Patient presents with    Follow-Up from Hospital     care mangement     Medication Problem     questioning sig       Patient presents for hospital follow-up. He was admitted days ago with altered mental status. Apparently, she was taking a bath and got stuck in the tub, was unable to get out. She spent almost 2 days there until her daughter discovered her on a home visit. Was delirious. Dehydration, delirium, Augie tract infection, neurologic effects of parkinsonism all contributing factors to her hospital stay. She was desiccated with IV fluids, antibiotics appropriate for urinary tract infection were initiated. Her medications were restarted. She was improved for discharge went home instead of to a skilled nursing facility.       The daughter and cousin are currently caring for her do not feel that they are able to do this on 24/7 basis. Nurse coordinator contacted the daughter, stated there was  would be in touch with them for further Angelman's after discharge. Daughter was instructed to get in touch with this individual so that a more comprehensive plan could be devised for her at home now. This may involve home care with live in nurses aides, or short-term skilled nursing facility stent at her strong enough to be able to manage at home by herself. Review of Systems   Constitutional: Negative for appetite change, fatigue and unexpected weight change. HENT: Negative for congestion, ear pain, hearing loss, sinus pain, sore throat and trouble swallowing. Eyes: Negative for photophobia, pain, discharge and redness. Respiratory: Negative for cough, chest tightness and shortness of breath. Cardiovascular: Negative for chest pain, palpitations and leg swelling. Gastrointestinal: Negative for abdominal pain, blood in stool, diarrhea, nausea and vomiting. Endocrine: Negative. Genitourinary: Negative for dysuria, flank pain, frequency and hematuria. Musculoskeletal: Positive for arthralgias, back pain and myalgias. Negative for joint swelling. Skin: Negative. Allergic/Immunologic: Negative. Neurological: Positive for tremors. Negative for dizziness, seizures, syncope, weakness, light-headedness, numbness and headaches. Hematological: Negative for adenopathy. Does not bruise/bleed easily. Psychiatric/Behavioral: Positive for decreased concentration. Current Outpatient Medications:     miconazole (MICOTIN) 2 % powder, Apply topically 2 times daily. , Disp: 45 g, Rfl: 0    carbidopa-levodopa (SINEMET)  MG per tablet, Take 1.5 tablets by mouth 3 times daily , Disp: , Rfl: 0    ropinirole (REQUIP) 1 MG tablet, Take 12 mg by mouth every morning (before breakfast) , Disp: , Rfl:     nortriptyline (PAMELOR) 10 MG capsule, Take 10 mg by mouth nightly , Disp: , Rfl:     white petrolatum OINT ointment, Apply topically 2 times daily (Patient not taking: Reported on 9/15/2021), Disp: 500 g, Rfl: 0    Handicap Placard MISC, by Does not apply route (Patient not taking: Reported on 9/15/2021), Disp: , Rfl:   No Known Allergies    Past Medical History:   Diagnosis Date    Degenerative joint disease involving multiple joints     Hyperlipidemia     Hypertension     Parkinson disease ([facility] [address]     Skin cancer     scc     Past Surgical History:   Procedure Laterality Date    KNEE SURGERY  left    TONSILLECTOMY AND ADENOIDECTOMY       Family History   Problem Relation Age of Onset    No Known Problems Mother     No Known Problems Father      Social History     Socioeconomic History    Marital status:      Spouse name: Not on file    Number of children: Not on file    Years of education: Not on file    Highest education level: Not on file   Occupational History    Not on file   Tobacco Use    Smoking status: Never Smoker    Smokeless tobacco: Never Used   Vaping Use    Vaping Use: Never used   Substance and Sexual Activity    Alcohol use: Not Currently    Drug use: No    Sexual activity: Not Currently   Other Topics Concern    Not on file   Social History Narrative    Not on file     Social Determinants of Health     Financial Resource Strain: Unknown    Difficulty of Paying Living Expenses: Patient refused   Food Insecurity: Unknown    Worried About Running Out of Food in the Last Year: Patient refused    Ran Out of Food in the Last Year: Patient refused   Transportation Needs:     Lack of Transportation (Medical):      Lack of Transportation (Non-Medical):    Physical Activity:     Days of Exercise per Week:     Minutes of Exercise per Session:    Stress:     Feeling of Stress :    Social Connections:     Frequency of Communication with Friends and Family:     Frequency of Social Gatherings with Friends and Family:     Attends Hoahaoism Services:     Active Member of Clubs or Organizations:     Attends Club or Organization Meetings:     Marital Status:    Intimate Partner Violence:     Fear of Current or Ex-Partner:     Emotionally Abused:     Physically Abused:     Sexually Abused:        Vitals:    09/15/21 1426   BP: 132/80   Pulse: 58   Resp: 16   Temp: 97.5 °F (36.4 °C)   TempSrc: Temporal   SpO2: 98%   Weight: 155 lb (70.3 kg)   Height: 4' 11\" (1.499 m)       Physical Exam  Vitals and nursing note reviewed. Constitutional:       Appearance: She is well-developed. HENT:      Head: Atraumatic. Right Ear: External ear normal.      Left Ear: External ear normal.      Nose: Nose normal.      Mouth/Throat:      Pharynx: No oropharyngeal exudate. Eyes:      Conjunctiva/sclera: Conjunctivae normal.      Pupils: Pupils are equal, round, and reactive to light. Neck:      Thyroid: No thyromegaly. Trachea: No tracheal deviation. Cardiovascular:      Rate and Rhythm: Normal rate and regular rhythm. Heart sounds: No murmur heard. No friction rub. No gallop. Pulmonary:      Effort: Pulmonary effort is normal. No respiratory distress. Breath sounds: Normal breath sounds. Abdominal:      General: Bowel sounds are normal.      Palpations: Abdomen is soft. Musculoskeletal:         General: Tenderness present. No deformity. Normal range of motion. Cervical back: Normal range of motion and neck supple. Comments: Patient in a wheelchair. Analyzed arthralgias tenderness, stiffness, decreased range of motion. Lymphadenopathy:      Cervical: No cervical adenopathy. Skin:     General: Skin is warm and dry. Capillary Refill: Capillary refill takes less than 2 seconds. Findings: No rash. Neurological:      Mental Status: She is alert and oriented to person, place, and time. Sensory: No sensory deficit. Motor: Weakness present.  No abnormal muscle tone. Coordination: Coordination abnormal.      Gait: Gait abnormal.      Deep Tendon Reflexes: Reflexes normal.      Comments: Coarse tremor noted upper extremities.              Seen By:  Karen Poe DO

## 2021-09-15 NOTE — PATIENT INSTRUCTIONS
Personalized Preventive Plan for Harley Pérez - 9/15/2021  Medicare offers a range of preventive health benefits. Some of the tests and screenings are paid in full while other may be subject to a deductible, co-insurance, and/or copay. Some of these benefits include a comprehensive review of your medical history including lifestyle, illnesses that may run in your family, and various assessments and screenings as appropriate. After reviewing your medical record and screening and assessments performed today your provider may have ordered immunizations, labs, imaging, and/or referrals for you. A list of these orders (if applicable) as well as your Preventive Care list are included within your After Visit Summary for your review. Other Preventive Recommendations:    · A preventive eye exam performed by an eye specialist is recommended every 1-2 years to screen for glaucoma; cataracts, macular degeneration, and other eye disorders. · A preventive dental visit is recommended every 6 months. · Try to get at least 150 minutes of exercise per week or 10,000 steps per day on a pedometer . · Order or download the FREE \"Exercise & Physical Activity: Your Everyday Guide\" from [employer]. Call 7-763.466.6218 or search [employer] online. · You need 3564-4316 mg of calcium and 6236-4622 IU of vitamin D per day. It is possible to meet your calcium requirement with diet alone, but a vitamin D supplement is usually necessary to meet this goal.  · When exposed to the sun, use a sunscreen that protects against both UVA and UVB radiation with an SPF of 30 or greater. Reapply every 2 to 3 hours or after sweating, drying off with a towel, or swimming. · Always wear a seat belt when traveling in a car. Always wear a helmet when riding a bicycle or motorcycle.

## 2021-09-16 ENCOUNTER — CARE COORDINATION (OUTPATIENT)
Dept: CARE COORDINATION | Age: 81
End: 2021-09-16

## 2021-09-16 PROBLEM — R41.0 ACUTE DELIRIUM: Status: ACTIVE | Noted: 2021-09-16

## 2021-09-16 PROBLEM — E86.0 DEHYDRATION: Status: ACTIVE | Noted: 2021-09-16

## 2021-09-16 ASSESSMENT — ENCOUNTER SYMPTOMS
SORE THROAT: 0
EYE PAIN: 0
VOMITING: 0
SINUS PAIN: 0
EYE REDNESS: 0
CHEST TIGHTNESS: 0
BACK PAIN: 1
ALLERGIC/IMMUNOLOGIC NEGATIVE: 1
BLOOD IN STOOL: 0
NAUSEA: 0
DIARRHEA: 0
EYE DISCHARGE: 0
PHOTOPHOBIA: 0
TROUBLE SWALLOWING: 0
ABDOMINAL PAIN: 0
COUGH: 0
SHORTNESS OF BREATH: 0

## 2021-09-16 NOTE — CARE COORDINATION
Initial Contact Social Work Note - Ambulatory  9/16/2021      Date of referral: 9/15/21  Referral received from: [facility]  Reason for referral: In home assistance    Previous SW referral: No  If yes, brief summary of outcome:     Two Identifiers Verified: Yes    Insurance Provider: Carlos Davila: Adult Child/Children     Status: N/A    Community Providers: N/A    ADL Assistance Needed: Bathing, Dressing and Toileting      Housing/Living Concerns or Home Modification Needs: N/A     Transportation Concern: None    Medication Cost Concern: None     Medication Adherence Concern: N/A    Financial Concern(s): None    Income (only if applicable): N /A    Ability to Read/Write: Yes    Advance Care Plan:  N/A    Other:     Identified Needs:   ADL assistance   Med management   Home care    Social Work Plan:   Educated family on in home options, [facility], [facility] programs and private pay   Explained lack of HHA   Provided contact info for [facility]        *Marisela Cornea reports patient is in need of  ADL assistance, bathing, dressing, housekeeping, med management, has bed side commode. Currently has wounds being treated by Marika Monk nurse.

## 2021-09-22 ENCOUNTER — CARE COORDINATION (OUTPATIENT)
Dept: CARE COORDINATION | Age: 81
End: 2021-09-22

## 2021-09-22 NOTE — CARE COORDINATION
[facility] Transitions Follow Up Call    2021    Patient: Kenneth Velez  Patient : 1940   MRN: <[id number]>  Reason for Admission: -21 Acute Cystitis w/o Hematuria; Altered Mental Status; Dehydration  Discharge Date: 21 RARS: Readmission Risk Score: 11         Spoke with: Pt and her daughter Shabana Gruber states that she is doing better, denies falls, fatigue, worsening weakness. Pt states appetite and fluid intake is good, elimination patterns normal. Antibiotic Therapy completed. Pt states that she has all medications and taking as prescribed. Unable to do Medication Review, Pt and daughter in car driving. Pt unsure if her daughter Nicholas Garcia received AL, HHA, or Life Alert information from Erik Multani. CTN will f/u next week. PCP f/u completed. No medication changes. Denies Transportation, Home, or Medication needs. Care Transitions Follow Up Call    Needs to be reviewed by the provider   Additional needs identified to be addressed with provider: No  none         Method of communication with provider : none      Care Transition Nurse (CTN) contacted the patient by telephone to follow up after admission on 21. Verified name and  with patient as identifiers. Addressed changes since last contact: none  Discussed follow-up appointments. If no appointment was previously scheduled, appointment scheduling offered: Yes. Is follow up appointment scheduled within 7 days of discharge? Yes. Advance Care Planning:   Does patient have an Advance Directive: not addressed. CTN reviewed discharge instructions, medical action plan and red flags with patient and discussed any barriers to care and/or understanding of plan of care after discharge. Discussed appropriate site of care based on symptoms and resources available to patient including: PCP, When to call 911 and [address]. The patient agrees to contact the PCP office for questions related to their healthcare.      Patients top risk factors for readmission: medical condition-Parkinson Disease  Interventions to address risk factors: Obtained and reviewed discharge summary and/or continuity of care documents      Non-[facility] follow up appointment(s):     CTN provided contact information for future needs. Plan for follow-up call in 5-7 days based on severity of symptoms and risk factors. Plan for next call: community resources-AL, HHA, Life Alert Info          Care Transitions Subsequent and Final Call    Subsequent and Final Calls  Are you currently active with any services?: Home Health  Care Transitions Interventions    Social Work: Completed    Other Interventions: Follow Up  No future appointments.     Quan Holland LPN

## 2021-10-04 ENCOUNTER — TELEPHONE (OUTPATIENT)
Dept: FAMILY MEDICINE CLINIC | Age: 81
End: 2021-10-04

## 2021-10-04 NOTE — TELEPHONE ENCOUNTER
Nurse sanya calling to alert you of a bp of 132/ 92. The diastolic is high. She is asymptomatic. Nurse thinks it could from a change of sodium in her diet.

## 2021-10-06 NOTE — TELEPHONE ENCOUNTER
Spoke with patient was given edwina # [place] [address] [facility] with sanya she was on call she was given  instructions to continue close monitoring. Verbal understanding given ,she will have Jarred check again when he is in home tomorrow.

## 2021-10-09 PROBLEM — N39.0 UTI (URINARY TRACT INFECTION): Status: RESOLVED | Noted: 2021-09-09 | Resolved: 2021-10-09

## 2021-10-16 PROBLEM — E86.0 DEHYDRATION: Status: RESOLVED | Noted: 2021-09-16 | Resolved: 2021-10-16

## 2021-10-28 ENCOUNTER — TELEPHONE (OUTPATIENT)
Dept: FAMILY MEDICINE CLINIC | Age: 81
End: 2021-10-28

## 2021-10-28 NOTE — TELEPHONE ENCOUNTER
Lilo/Christopher Senior Jimmy Torres called to say that pt has been having elevated blood pressures. Her recent reading 164/105. Pt is asymptomatic. Alana Lopez reported that pt has +2 edema bilateral leg.

## 2021-10-28 NOTE — TELEPHONE ENCOUNTER
[address] [facility] and called patient. She is scheduled for tomorrow afternoon.

## 2021-10-29 ENCOUNTER — OFFICE VISIT (OUTPATIENT)
Dept: FAMILY MEDICINE CLINIC | Age: 81
End: 2021-10-29
Payer: MEDICARE

## 2021-10-29 VITALS
TEMPERATURE: 97.4 F | HEART RATE: 84 BPM | SYSTOLIC BLOOD PRESSURE: 154 MMHG | HEIGHT: 59 IN | WEIGHT: 155 LBS | OXYGEN SATURATION: 96 % | BODY MASS INDEX: 31.25 KG/M2 | DIASTOLIC BLOOD PRESSURE: 92 MMHG | RESPIRATION RATE: 16 BRPM

## 2021-10-29 DIAGNOSIS — E78.2 MIXED HYPERLIPIDEMIA: ICD-10-CM

## 2021-10-29 DIAGNOSIS — G20 PARKINSON DISEASE (HCC): ICD-10-CM

## 2021-10-29 DIAGNOSIS — I10 ESSENTIAL HYPERTENSION: ICD-10-CM

## 2021-10-29 DIAGNOSIS — I10 PRIMARY HYPERTENSION: Primary | ICD-10-CM

## 2021-10-29 LAB
ALBUMIN SERPL-MCNC: 4 G/DL (ref 3.5–5.2)
ALP BLD-CCNC: 73 U/L (ref 35–104)
ALT SERPL-CCNC: <5 U/L (ref 0–32)
ANION GAP SERPL CALCULATED.3IONS-SCNC: 15 MMOL/L (ref 7–16)
AST SERPL-CCNC: 11 U/L (ref 0–31)
BASOPHILS ABSOLUTE: 0.08 E9/L (ref 0–0.2)
BASOPHILS RELATIVE PERCENT: 1 % (ref 0–2)
BILIRUB SERPL-MCNC: 0.6 MG/DL (ref 0–1.2)
BUN BLDV-MCNC: 21 MG/DL (ref 6–23)
CALCIUM SERPL-MCNC: 9.3 MG/DL (ref 8.6–10.2)
CHLORIDE BLD-SCNC: 106 MMOL/L (ref 98–107)
CHOLESTEROL, TOTAL: 259 MG/DL (ref 0–199)
CO2: 20 MMOL/L (ref 22–29)
CREAT SERPL-MCNC: 1.1 MG/DL (ref 0.5–1)
EOSINOPHILS ABSOLUTE: 0.24 E9/L (ref 0.05–0.5)
EOSINOPHILS RELATIVE PERCENT: 3 % (ref 0–6)
GFR AFRICAN AMERICAN: 58
GFR NON-AFRICAN AMERICAN: 48 ML/MIN/1.73
GLUCOSE FASTING: 69 MG/DL (ref 74–99)
HCT VFR BLD CALC: 47.1 % (ref 34–48)
HDLC SERPL-MCNC: 72 MG/DL
HEMOGLOBIN: 15 G/DL (ref 11.5–15.5)
IMMATURE GRANULOCYTES #: 0.04 E9/L
IMMATURE GRANULOCYTES %: 0.5 % (ref 0–5)
LDL CHOLESTEROL CALCULATED: 167 MG/DL (ref 0–99)
LYMPHOCYTES ABSOLUTE: 1.6 E9/L (ref 1.5–4)
LYMPHOCYTES RELATIVE PERCENT: 19.7 % (ref 20–42)
MCH RBC QN AUTO: 31.6 PG (ref 26–35)
MCHC RBC AUTO-ENTMCNC: 31.8 % (ref 32–34.5)
MCV RBC AUTO: 99.2 FL (ref 80–99.9)
MONOCYTES ABSOLUTE: 0.83 E9/L (ref 0.1–0.95)
MONOCYTES RELATIVE PERCENT: 10.2 % (ref 2–12)
NEUTROPHILS ABSOLUTE: 5.34 E9/L (ref 1.8–7.3)
NEUTROPHILS RELATIVE PERCENT: 65.6 % (ref 43–80)
PDW BLD-RTO: 12.9 FL (ref 11.5–15)
PLATELET # BLD: 324 E9/L (ref 130–450)
PMV BLD AUTO: 10 FL (ref 7–12)
POTASSIUM SERPL-SCNC: 4.4 MMOL/L (ref 3.5–5)
RBC # BLD: 4.75 E12/L (ref 3.5–5.5)
SODIUM BLD-SCNC: 141 MMOL/L (ref 132–146)
TOTAL PROTEIN: 6.6 G/DL (ref 6.4–8.3)
TRIGL SERPL-MCNC: 99 MG/DL (ref 0–149)
TSH SERPL DL<=0.05 MIU/L-ACNC: 2.36 UIU/ML (ref 0.27–4.2)
VLDLC SERPL CALC-MCNC: 20 MG/DL
WBC # BLD: 8.1 E9/L (ref 4.5–11.5)

## 2021-10-29 PROCEDURE — 99213 OFFICE O/P EST LOW 20 MIN: CPT | Performed by: FAMILY MEDICINE

## 2021-10-29 RX ORDER — LISINOPRIL AND HYDROCHLOROTHIAZIDE 12.5; 1 MG/1; MG/1
1 TABLET ORAL DAILY
Qty: 30 TABLET | Refills: 0 | Status: SHIPPED
Start: 2021-10-29 | End: 2021-11-12 | Stop reason: SDUPTHER

## 2021-10-29 ASSESSMENT — ENCOUNTER SYMPTOMS
BACK PAIN: 0
NAUSEA: 0
CHEST TIGHTNESS: 0
ABDOMINAL PAIN: 0
BLOOD IN STOOL: 0
SHORTNESS OF BREATH: 0
VOMITING: 0
DIARRHEA: 0
EYE DISCHARGE: 0
PHOTOPHOBIA: 0
SORE THROAT: 0
SINUS PAIN: 0
EYE REDNESS: 0
TROUBLE SWALLOWING: 0
ALLERGIC/IMMUNOLOGIC NEGATIVE: 1
EYE PAIN: 0
COUGH: 0

## 2021-10-29 NOTE — PROGRESS NOTES
10/29/21  Thania Ochoa : 1940 Sex: female  Age: [de-identified] y.o. Assessment and Plan:  Hira Cheek was seen today for hypertension. Diagnoses and all orders for this visit:    Primary hypertension  -     lisinopril-hydroCHLOROthiazide (PRINZIDE;ZESTORETIC) 10-12.5 MG per tablet; Take 1 tablet by mouth daily    Parkinson disease ([address]    Made good improvement with physical therapy. Unsteady on her feet, to follow perform the exercises necessary for her to maintain good function. Return in about 2 weeks (around 2021). Chief Complaint   Patient presents with    Hypertension       The patient is here for blood pressure check. Patient has been taking their medications as prescribed. No recent changes to their medications. No headache or visual disturbances. No dizziness or tinnitus. No chest pain, palpitations, or dyspnea. No nausea and vomiting. No numbness, tingling and or weakness to the extremities. No fevers or chills. No recent illness. Review of Systems   Constitutional: Negative for appetite change, fatigue and unexpected weight change. HENT: Negative for congestion, ear pain, hearing loss, sinus pain, sore throat and trouble swallowing. Eyes: Negative for photophobia, pain, discharge and redness. Respiratory: Negative for cough, chest tightness and shortness of breath. Cardiovascular: Negative for chest pain, palpitations and leg swelling. Gastrointestinal: Negative for abdominal pain, blood in stool, diarrhea, nausea and vomiting. Endocrine: Negative. Genitourinary: Negative for dysuria, flank pain, frequency and hematuria. Musculoskeletal: Negative for arthralgias, back pain, joint swelling and myalgias. Skin: Negative. Allergic/Immunologic: Negative. Neurological: Positive for tremors. Negative for dizziness, seizures, syncope, weakness, light-headedness, numbness and headaches. Hematological: Negative for adenopathy. Does not bruise/bleed easily. Psychiatric/Behavioral: Negative. Current Outpatient Medications:     lisinopril-hydroCHLOROthiazide (PRINZIDE;ZESTORETIC) 10-12.5 MG per tablet, Take 1 tablet by mouth daily, Disp: 30 tablet, Rfl: 0    miconazole (MICOTIN) 2 % powder, Apply topically 2 times daily. , Disp: 45 g, Rfl: 0    white petrolatum OINT ointment, Apply topically 2 times daily, Disp: 500 g, Rfl: 0    Handicap Placard MISC, by Does not apply route , Disp: , Rfl:     carbidopa-levodopa (SINEMET)  MG per tablet, Take 1.5 tablets by mouth 3 times daily , Disp: , Rfl: 0    ropinirole (REQUIP) 1 MG tablet, Take 12 mg by mouth every morning (before breakfast) , Disp: , Rfl:     nortriptyline (PAMELOR) 10 MG capsule, Take 10 mg by mouth nightly , Disp: , Rfl:   No Known Allergies    Past Medical History:   Diagnosis Date    Degenerative joint disease involving multiple joints     Hyperlipidemia     Hypertension     Parkinson disease ([facility]     Skin cancer     scc     Past Surgical History:   Procedure Laterality Date    KNEE SURGERY  left    TONSILLECTOMY AND ADENOIDECTOMY       Family History   Problem Relation Age of Onset    No Known Problems Mother     No Known Problems Father      Social History     Socioeconomic History    Marital status:      Spouse name: Not on file    Number of children: Not on file    Years of education: Not on file    Highest education level: Not on file   Occupational History    Not on file   Tobacco Use    Smoking status: Never Smoker    Smokeless tobacco: Never Used   Vaping Use    Vaping Use: Never used   Substance and Sexual Activity    Alcohol use: Not Currently    Drug use: No    Sexual activity: Not Currently   Other Topics Concern    Not on file   Social History Narrative    Not on file     Social Determinants of Health     Financial Resource Strain: Unknown    Difficulty of Paying Living Expenses: Patient refused   Food Insecurity: Unknown    Worried About Running Out of Food in the Last Year: Patient refused   [address] in the Last Year: Patient refused   Transportation Needs:     Lack of Transportation (Medical):  Lack of Transportation (Non-Medical):    Physical Activity:     Days of Exercise per Week:     Minutes of Exercise per Session:    Stress:     Feeling of Stress :    Social Connections:     Frequency of Communication with Friends and Family:     Frequency of Social Gatherings with Friends and Family:     Attends [employer] Services:     Active Member of Clubs or Organizations:     Attends Club or Organization Meetings:     Marital Status:    Intimate Partner Violence:     Fear of Current or Ex-Partner:     Emotionally Abused:     Physically Abused:     Sexually Abused:        Vitals:    10/29/21 1418 10/29/21 1421 10/29/21 1444   BP: (!) 158/102 (!) 158/102 (!) 154/92   Pulse: 84     Resp: 16     Temp: 97.4 °F (36.3 °C)     TempSrc: Temporal     SpO2: 96%     Weight: 155 lb (70.3 kg)     Height: 4' 11\" (1.499 m)         Physical Exam  Vitals and nursing note reviewed. Constitutional:       Appearance: She is well-developed. HENT:      Head: Atraumatic. Right Ear: External ear normal.      Left Ear: External ear normal.      Nose: Nose normal.      Mouth/Throat:      Pharynx: No oropharyngeal exudate. Eyes:      Conjunctiva/sclera: Conjunctivae normal.      Pupils: Pupils are equal, round, and reactive to light. Neck:      Thyroid: No thyromegaly. Trachea: No tracheal deviation. Cardiovascular:      Rate and Rhythm: Normal rate and regular rhythm. Heart sounds: No murmur heard. No friction rub. No gallop. Pulmonary:      Effort: Pulmonary effort is normal. No respiratory distress. Breath sounds: Normal breath sounds. Abdominal:      General: Bowel sounds are normal.      Palpations: Abdomen is soft. Musculoskeletal:         General: No tenderness or deformity. Normal range of motion. Cervical back: Normal range of motion and neck supple. Lymphadenopathy:      Cervical: No cervical adenopathy. Skin:     General: Skin is warm and dry. Capillary Refill: Capillary refill takes less than 2 seconds. Findings: No rash. Neurological:      Mental Status: She is alert and oriented to person, place, and time. Sensory: No sensory deficit. Motor: No abnormal muscle tone.       Coordination: Coordination normal.      Deep Tendon Reflexes: Reflexes normal.             Seen By:  Faustina Dave,

## 2021-11-11 ENCOUNTER — CARE COORDINATION (OUTPATIENT)
Dept: CARE COORDINATION | Age: 81
End: 2021-11-11

## 2021-11-11 NOTE — CARE COORDINATION
Attempted to reach patient by telephone. Left HIPAA compliant message on mobile phone requesting a return call. Will attempt to reach patient again.

## 2021-11-12 ENCOUNTER — OFFICE VISIT (OUTPATIENT)
Dept: FAMILY MEDICINE CLINIC | Age: 81
End: 2021-11-12
Payer: MEDICARE

## 2021-11-12 VITALS
HEART RATE: 56 BPM | SYSTOLIC BLOOD PRESSURE: 112 MMHG | TEMPERATURE: 97.2 F | DIASTOLIC BLOOD PRESSURE: 64 MMHG | OXYGEN SATURATION: 96 % | BODY MASS INDEX: 31.31 KG/M2 | WEIGHT: 155 LBS

## 2021-11-12 DIAGNOSIS — I10 PRIMARY HYPERTENSION: Primary | ICD-10-CM

## 2021-11-12 PROCEDURE — 99213 OFFICE O/P EST LOW 20 MIN: CPT | Performed by: FAMILY MEDICINE

## 2021-11-12 RX ORDER — LISINOPRIL AND HYDROCHLOROTHIAZIDE 12.5; 1 MG/1; MG/1
1 TABLET ORAL DAILY
Qty: 90 TABLET | Refills: 1 | Status: SHIPPED
Start: 2021-11-12 | End: 2021-12-02 | Stop reason: HOSPADM

## 2021-11-12 ASSESSMENT — ENCOUNTER SYMPTOMS
SHORTNESS OF BREATH: 0
TROUBLE SWALLOWING: 0
VOMITING: 0
SINUS PAIN: 0
CHEST TIGHTNESS: 0
SORE THROAT: 0
BLOOD IN STOOL: 0
PHOTOPHOBIA: 0
ALLERGIC/IMMUNOLOGIC NEGATIVE: 1
BACK PAIN: 0
NAUSEA: 0
EYE REDNESS: 0
COUGH: 0
EYE DISCHARGE: 0
DIARRHEA: 0
ABDOMINAL PAIN: 0
EYE PAIN: 0

## 2021-11-12 NOTE — PROGRESS NOTES
21  Lajune Lefort : 1940 Sex: female  Age: [de-identified] y.o. Assessment and Plan:  Tommy Alex was seen today for hypertension and other. Diagnoses and all orders for this visit:    Primary hypertension  -     lisinopril-hydroCHLOROthiazide (PRINZIDE;ZESTORETIC) 10-12.5 MG per tablet; Take 1 tablet by mouth daily        No follow-ups on file. Chief Complaint   Patient presents with    Hypertension     2 week f/up    Other     questions regarding labs / new statin med        The patient is here for blood pressure check. Patient has been taking their medications as prescribed. No recent changes to their medications. No headache or visual disturbances. No dizziness or tinnitus. No chest pain, palpitations, or dyspnea. No nausea and vomiting. No numbness, tingling and or weakness to the extremities. No fevers or chills. No recent illness. Viewed visiting nurse blood pressure list, good response to the new blood pressure medication. Review of Systems   Constitutional: Negative for appetite change, fatigue and unexpected weight change. HENT: Negative for congestion, ear pain, hearing loss, sinus pain, sore throat and trouble swallowing. Eyes: Negative for photophobia, pain, discharge and redness. Respiratory: Negative for cough, chest tightness and shortness of breath. Cardiovascular: Negative for chest pain, palpitations and leg swelling. Gastrointestinal: Negative for abdominal pain, blood in stool, diarrhea, nausea and vomiting. Endocrine: Negative. Genitourinary: Negative for dysuria, flank pain, frequency and hematuria. Musculoskeletal: Negative for arthralgias, back pain, joint swelling and myalgias. Skin: Negative. Allergic/Immunologic: Negative. Neurological: Negative for dizziness, seizures, syncope, weakness, light-headedness, numbness and headaches. Hematological: Negative for adenopathy. Does not bruise/bleed easily. Psychiatric/Behavioral: Negative. Current Outpatient Medications:     miconazole (MICOTIN) 2 % powder, Apply topically 2 times daily. , Disp: 45 g, Rfl: 0    white petrolatum OINT ointment, Apply topically 2 times daily, Disp: 500 g, Rfl: 0    Handicap Placard MISC, by Does not apply route , Disp: , Rfl:     carbidopa-levodopa (SINEMET)  MG per tablet, Take 1.5 tablets by mouth 3 times daily , Disp: , Rfl: 0    ropinirole (REQUIP) 1 MG tablet, Take 12 mg by mouth every morning (before breakfast) , Disp: , Rfl:     nortriptyline (PAMELOR) 10 MG capsule, Take 10 mg by mouth nightly , Disp: , Rfl:     lisinopril-hydroCHLOROthiazide (PRINZIDE;ZESTORETIC) 10-12.5 MG per tablet, Take 1 tablet by mouth daily, Disp: 90 tablet, Rfl: 1  No Known Allergies    Past Medical History:   Diagnosis Date    Degenerative joint disease involving multiple joints     Hyperlipidemia     Hypertension     Parkinson disease ([facility] [address]     Skin cancer     scc     Past Surgical History:   Procedure Laterality Date    KNEE SURGERY  left    TONSILLECTOMY AND ADENOIDECTOMY       Family History   Problem Relation Age of Onset    No Known Problems Mother     No Known Problems Father      Social History     Socioeconomic History    Marital status:      Spouse name: Not on file    Number of children: Not on file    Years of education: Not on file    Highest education level: Not on file   Occupational History    Not on file   Tobacco Use    Smoking status: Never Smoker    Smokeless tobacco: Never Used   Vaping Use    Vaping Use: Never used   Substance and Sexual Activity    Alcohol use: Not Currently    Drug use: No    Sexual activity: Not Currently   Other Topics Concern    Not on file   Social History Narrative    Not on file     Social Determinants of Health     Financial Resource Strain: Unknown    Difficulty of Paying Living Expenses: Patient refused   Food Insecurity: Unknown    Worried About Running Out of Food in the Last Year: Patient refused   [address] in the Last Year: Patient refused   Transportation Needs:     Lack of Transportation (Medical): Not on file    Lack of Transportation (Non-Medical): Not on file   Physical Activity:     Days of Exercise per Week: Not on file    Minutes of Exercise per Session: Not on file   Stress:     Feeling of Stress : Not on file   Social Connections:     Frequency of Communication with Friends and Family: Not on file    Frequency of Social Gatherings with Friends and Family: Not on file    Attends Gnosticism Services: Not on file    Active Member of [address] [place] [address] [employer] or Organizations: Not on file    Attends Club or Organization Meetings: Not on file    Marital Status: Not on file   Intimate Partner Violence:     Fear of Current or Ex-Partner: Not on file    Emotionally Abused: Not on file    Physically Abused: Not on file    Sexually Abused: Not on file   Housing Stability:     Unable to Pay for Housing in the Last Year: Not on file    Number of [place] in the Last Year: Not on file    Unstable Housing in the Last Year: Not on file       Vitals:    11/12/21 1405   BP: 112/64   Pulse: 56   Temp: 97.2 °F (36.2 °C)   SpO2: 96%   Weight: 155 lb (70.3 kg)       Physical Exam  Vitals and nursing note reviewed. Constitutional:       Appearance: She is well-developed. HENT:      Head: Atraumatic. Right Ear: External ear normal.      Left Ear: External ear normal.      Nose: Nose normal.      Mouth/Throat:      Pharynx: No oropharyngeal exudate. Eyes:      Conjunctiva/sclera: Conjunctivae normal.      Pupils: Pupils are equal, round, and reactive to light. Neck:      Thyroid: No thyromegaly. Trachea: No tracheal deviation. Cardiovascular:      Rate and Rhythm: Normal rate and regular rhythm. Heart sounds: No murmur heard. No friction rub. No gallop. Pulmonary:      Effort: Pulmonary effort is normal. No respiratory distress.       Breath sounds: Normal breath sounds. Abdominal:      General: Bowel sounds are normal.      Palpations: Abdomen is soft. Musculoskeletal:         General: No tenderness or deformity. Normal range of motion. Cervical back: Normal range of motion and neck supple. Lymphadenopathy:      Cervical: No cervical adenopathy. Skin:     General: Skin is warm and dry. Capillary Refill: Capillary refill takes less than 2 seconds. Findings: No rash. Neurological:      Mental Status: She is alert and oriented to person, place, and time. Sensory: No sensory deficit. Motor: No abnormal muscle tone.       Coordination: Coordination normal.      Deep Tendon Reflexes: Reflexes normal.             Seen By:  Jami Traore DO

## 2021-12-01 ENCOUNTER — APPOINTMENT (OUTPATIENT)
Dept: CT IMAGING | Age: 81
End: 2021-12-01
Payer: MEDICARE

## 2021-12-01 ENCOUNTER — HOSPITAL ENCOUNTER (OUTPATIENT)
Age: 81
Setting detail: OBSERVATION
Discharge: HOME | End: 2021-12-02
Attending: STUDENT IN AN ORGANIZED HEALTH CARE EDUCATION/TRAINING PROGRAM
Payer: MEDICARE

## 2021-12-01 ENCOUNTER — APPOINTMENT (OUTPATIENT)
Dept: GENERAL RADIOLOGY | Age: 81
End: 2021-12-01
Payer: MEDICARE

## 2021-12-01 DIAGNOSIS — E86.0 DEHYDRATION: ICD-10-CM

## 2021-12-01 DIAGNOSIS — R55 SYNCOPE AND COLLAPSE: Primary | ICD-10-CM

## 2021-12-01 DIAGNOSIS — N17.9 AKI (ACUTE KIDNEY INJURY) (HCC): ICD-10-CM

## 2021-12-01 LAB
ANION GAP SERPL CALCULATED.3IONS-SCNC: 12 MMOL/L (ref 7–16)
BACTERIA: ABNORMAL /HPF
BASOPHILS ABSOLUTE: 0.06 E9/L (ref 0–0.2)
BASOPHILS RELATIVE PERCENT: 0.8 % (ref 0–2)
BILIRUBIN URINE: NEGATIVE
BLOOD, URINE: NORMAL
BUN BLDV-MCNC: 46 MG/DL (ref 6–23)
CALCIUM SERPL-MCNC: 9.4 MG/DL (ref 8.6–10.2)
CHLORIDE BLD-SCNC: 103 MMOL/L (ref 98–107)
CLARITY: CLEAR
CO2: 23 MMOL/L (ref 22–29)
COLOR: YELLOW
CREAT SERPL-MCNC: 1.8 MG/DL (ref 0.5–1)
EOSINOPHILS ABSOLUTE: 0.13 E9/L (ref 0.05–0.5)
EOSINOPHILS RELATIVE PERCENT: 1.7 % (ref 0–6)
EPITHELIAL CELLS, UA: ABNORMAL /HPF
GFR AFRICAN AMERICAN: 33
GFR NON-AFRICAN AMERICAN: 27 ML/MIN/1.73
GLUCOSE BLD-MCNC: 119 MG/DL (ref 74–99)
GLUCOSE URINE: NEGATIVE MG/DL
HCT VFR BLD CALC: 43.6 % (ref 34–48)
HEMOGLOBIN: 14 G/DL (ref 11.5–15.5)
IMMATURE GRANULOCYTES #: 0.03 E9/L
IMMATURE GRANULOCYTES %: 0.4 % (ref 0–5)
KETONES, URINE: NEGATIVE MG/DL
LEUKOCYTE ESTERASE, URINE: NEGATIVE
LYMPHOCYTES ABSOLUTE: 0.68 E9/L (ref 1.5–4)
LYMPHOCYTES RELATIVE PERCENT: 8.9 % (ref 20–42)
MCH RBC QN AUTO: 31.8 PG (ref 26–35)
MCHC RBC AUTO-ENTMCNC: 32.1 % (ref 32–34.5)
MCV RBC AUTO: 99.1 FL (ref 80–99.9)
MONOCYTES ABSOLUTE: 0.62 E9/L (ref 0.1–0.95)
MONOCYTES RELATIVE PERCENT: 8.2 % (ref 2–12)
NEUTROPHILS ABSOLUTE: 6.08 E9/L (ref 1.8–7.3)
NEUTROPHILS RELATIVE PERCENT: 80 % (ref 43–80)
NITRITE, URINE: NEGATIVE
PDW BLD-RTO: 12.4 FL (ref 11.5–15)
PH UA: 5 (ref 5–9)
PLATELET # BLD: 283 E9/L (ref 130–450)
PMV BLD AUTO: 10 FL (ref 7–12)
POTASSIUM REFLEX MAGNESIUM: 4.2 MMOL/L (ref 3.5–5)
PROTEIN UA: NEGATIVE MG/DL
RBC # BLD: 4.4 E12/L (ref 3.5–5.5)
RBC UA: ABNORMAL /HPF (ref 0–2)
SODIUM BLD-SCNC: 138 MMOL/L (ref 132–146)
SPECIFIC GRAVITY UA: 1.02 (ref 1–1.03)
TROPONIN, HIGH SENSITIVITY: 22 NG/L (ref 0–9)
UROBILINOGEN, URINE: 0.2 E.U./DL
WAXY CASTS: ABNORMAL /LPF
WBC # BLD: 7.6 E9/L (ref 4.5–11.5)
WBC UA: ABNORMAL /HPF (ref 0–5)

## 2021-12-01 PROCEDURE — 80048 BASIC METABOLIC PNL TOTAL CA: CPT

## 2021-12-01 PROCEDURE — 85025 COMPLETE CBC W/AUTO DIFF WBC: CPT

## 2021-12-01 PROCEDURE — 96361 HYDRATE IV INFUSION ADD-ON: CPT

## 2021-12-01 PROCEDURE — 93005 ELECTROCARDIOGRAM TRACING: CPT

## 2021-12-01 PROCEDURE — 70450 CT HEAD/BRAIN W/O DYE: CPT

## 2021-12-01 PROCEDURE — 2580000003 HC RX 258: Performed by: STUDENT IN AN ORGANIZED HEALTH CARE EDUCATION/TRAINING PROGRAM

## 2021-12-01 PROCEDURE — 99284 EMERGENCY DEPT VISIT MOD MDM: CPT

## 2021-12-01 PROCEDURE — 96360 HYDRATION IV INFUSION INIT: CPT

## 2021-12-01 PROCEDURE — 81001 URINALYSIS AUTO W/SCOPE: CPT

## 2021-12-01 PROCEDURE — 96374 THER/PROPH/DIAG INJ IV PUSH: CPT

## 2021-12-01 PROCEDURE — 71045 X-RAY EXAM CHEST 1 VIEW: CPT

## 2021-12-01 PROCEDURE — 96372 THER/PROPH/DIAG INJ SC/IM: CPT

## 2021-12-01 PROCEDURE — 87088 URINE BACTERIA CULTURE: CPT

## 2021-12-01 PROCEDURE — 84484 ASSAY OF TROPONIN QUANT: CPT

## 2021-12-01 RX ORDER — 0.9 % SODIUM CHLORIDE 0.9 %
1000 INTRAVENOUS SOLUTION INTRAVENOUS ONCE
Status: COMPLETED | OUTPATIENT
Start: 2021-12-01 | End: 2021-12-01

## 2021-12-01 RX ADMIN — SODIUM CHLORIDE 1000 ML: 9 INJECTION, SOLUTION INTRAVENOUS at 17:59

## 2021-12-01 NOTE — ED PROVIDER NOTES
Rashi Dudley is a [de-identified] y.o. female    Chief Complaint   Patient presents with    Loss of Consciousness     pt arrives from home c/o syncopal event at home. pt reported seeing spots and passing out. pt recently started lisinopril. bp per ems 90/68 pt reports not taking in fluids recently. HPI   Rashi Dudley is a [de-identified] y.o. female presenting to the ED for Loss of Consciousness (pt arrives from home c/o syncopal event at home. pt reported seeing spots and passing out. pt recently started lisinopril. bp per ems 90/68 pt reports not taking in fluids recently. )    Patient was at home seated in the chair when she noticed colorful bubbles in her vision before she does not remember anything. The next thing she does remember was waking up in the ambulance. Apparently her neighbor who is over at the time said they tried to wake her up but was unable to. She did not have a postictal state, had no tongue biting or urinary incontinence. She reports that she started taking lisinopril about 1 month ago but that is the only medication change recently. She does not have a history of seizures, but she does have a history of Parkinson's. Symptoms began today with severe severity, and is 1 episode. Nothing makes it better, nothing makes it worse. Associate symptoms include none    History comes primarily from the patient. On arrival, the patient was assessed by history, physical exam, imaging studies, laboratory studies and ekg, vital signs. Vital signs blood pressure of 95/51 but otherwise normal.  And the patient was afebrile. Review of Systems   Constitutional: Negative for activity change, appetite change, fatigue and fever. HENT: Negative for congestion, rhinorrhea and sore throat. Eyes: Positive for visual disturbance. Negative for redness and itching. Respiratory: Negative for chest tightness, shortness of breath and wheezing. Cardiovascular: Negative for chest pain and palpitations. Gastrointestinal: Negative for abdominal pain, constipation, diarrhea, nausea and vomiting. Genitourinary: Negative for difficulty urinating, frequency and urgency. Musculoskeletal: Negative for arthralgias, back pain and myalgias. Skin: Negative for pallor and rash. Neurological: Positive for syncope. Negative for dizziness, weakness, numbness and headaches. Psychiatric/Behavioral: Negative for agitation, behavioral problems, confusion and decreased concentration. All other systems reviewed and are negative. Physical Exam  Vitals and nursing note reviewed. Constitutional:       General: She is not in acute distress. Appearance: Normal appearance. She is normal weight. She is not ill-appearing or toxic-appearing. HENT:      Head: Normocephalic and atraumatic. Right Ear: External ear normal.      Left Ear: External ear normal.      Nose: Nose normal. No congestion or rhinorrhea. Mouth/Throat:      Mouth: Mucous membranes are moist.      Pharynx: Oropharynx is clear. No oropharyngeal exudate. Eyes:      General: No scleral icterus. Extraocular Movements: Extraocular movements intact. Conjunctiva/sclera: Conjunctivae normal.      Pupils: Pupils are equal, round, and reactive to light. Cardiovascular:      Rate and Rhythm: Normal rate and regular rhythm. Pulses: Normal pulses. Heart sounds: Normal heart sounds. No murmur heard. Pulmonary:      Effort: Pulmonary effort is normal. No respiratory distress. Breath sounds: Normal breath sounds. No wheezing. Abdominal:      General: Bowel sounds are normal. There is no distension. Palpations: Abdomen is soft. Tenderness: There is no abdominal tenderness. Musculoskeletal:         General: No swelling or deformity. Normal range of motion. Cervical back: Normal range of motion and neck supple. No rigidity. Skin:     General: Skin is warm and dry.       Coloration: Skin is not jaundiced or pale.   Neurological:      General: No focal deficit present. Mental Status: She is alert and oriented to person, place, and time. Mental status is at baseline. Cranial Nerves: No cranial nerve deficit. Sensory: No sensory deficit. Motor: No weakness. Coordination: Coordination normal.   Psychiatric:         Mood and Affect: Mood normal.         Behavior: Behavior normal.         Thought Content: Thought content normal.         Judgment: Judgment normal.          Procedures     MDM   Patient presented to the Emergency Department for Loss of Consciousness (pt arrives from home c/o syncopal event at home. pt reported seeing spots and passing out. pt recently started lisinopril. bp per ems 90/68 pt reports not taking in fluids recently. )    They are clinically stable, vital signs stable, non toxic appearing. Patient is an 26-year-old female presents from home with a syncopal event in which she lost consciousness in her chair but did no fall or injure anything. She has no complaints or pain at this time. Labs:  Patient has an [place] with a creatinine of 1.8 up from 1.1 a month ago. Troponin XX 2 repeat ordered. CBC is normal.    Imaging:  Chest x-ray normal.  CT head acute intracranial findings. Findings suggestive of mild chronic microvascular ischemia. Patient will be admitted for syncope and collapse. Dr. Mary Morales spoke with Dr. Nayely Muñiz for admission he will accept. EKG: This EKG is signed and interpreted by me. Rate: 72  Rhythm: Sinus  Axis: normal  Interpretation: no acute changes  Comparison: changes compared to previous EKG    ED Course as of 12/02/21 1244   Wed Dec 01, 2021   0019 ATTENDING PROVIDER ATTESTATION:     I have personally performed and/or participated in the history, exam, medical decision making, and procedures and agree with all pertinent clinical information unless otherwise noted.       I have also reviewed and agree with the past medical, family and social history unless otherwise noted. I have discussed this patient in detail with the resident, and provided the instruction and education regarding the patient. My findings/plan: This is an 7year-old female with history of Parkinson's and hypertension who presents for evaluation of syncopal episode. She notes prior to arrival she was sitting at her kitchen table with some friends when she began to feel lightheaded and had some visual changes. She notes she then went unresponsive and the next and she remembers is being in the back of an ambulance. Currently the patient is lying in bed in no acute distress. She is awake, alert, oriented to person, place, time. Notes that she was discharged on lisinopril and a water pill about 1 month ago and has been having very low blood pressures at home. Notes she was 104/60 earlier this week. Denies any dizziness, lightheadedness, chest pain or shortness breath, nausea, vomiting, diarrhea. On exam the patient has clear lungs bilaterally anteriorlyHeart regular rate and rhythmAbdomen soft, nondistended, nontenderPupils equal round reactive to light. Plan for imaging and supportive care. Her initial pressure was low, concern for orthostatic hypotension.     [BB]   2049 Patient's son, Yakelin García, requests a phone call if the patient will be admitted or discharged as he will be the one to come pick her up. His cell phone number is [phone] [KS]   Thu Dec 02, 2021   0025 Spoke with Dr. Lorraine Bajwa, discussed the patient. He will admit the patient. [BB]      ED Course User Index  [BB] Britta Hough DO  [KS] Jacob Danielle MD       --------------------------------------------- PAST HISTORY ---------------------------------------------  Past Medical History:  has a past medical history of Degenerative joint disease involving multiple joints, Hyperlipidemia, Hypertension, Parkinson disease ([address] and Skin cancer.     Past Surgical History:  has a past surgical history that includes knee surgery (left) and Tonsillectomy and adenoidectomy. Social History:  reports that she has never smoked. She has never used smokeless tobacco. She reports previous alcohol use. She reports that she does not use drugs. Family History: family history includes No Known Problems in her father and mother. The patients home medications have been reviewed. Allergies: Patient has no known allergies.     -------------------------------------------------- RESULTS -------------------------------------------------    LABS:  Results for orders placed or performed during the hospital encounter of 12/01/21   CBC Auto Differential   Result Value Ref Range    WBC 7.6 4.5 - 11.5 E9/L    RBC 4.40 3.50 - 5.50 E12/L    Hemoglobin 14.0 11.5 - 15.5 g/dL    Hematocrit 43.6 34.0 - 48.0 %    MCV 99.1 80.0 - 99.9 fL    MCH 31.8 26.0 - 35.0 pg    MCHC 32.1 32.0 - 34.5 %    RDW 12.4 11.5 - 15.0 fL    Platelets 716 398 - 047 E9/L    MPV 10.0 7.0 - 12.0 fL    Neutrophils % 80.0 43.0 - 80.0 %    Immature Granulocytes % 0.4 0.0 - 5.0 %    Lymphocytes % 8.9 (L) 20.0 - 42.0 %    Monocytes % 8.2 2.0 - 12.0 %    Eosinophils % 1.7 0.0 - 6.0 %    Basophils % 0.8 0.0 - 2.0 %    Neutrophils Absolute 6.08 1.80 - 7.30 E9/L    Immature Granulocytes # 0.03 E9/L    Lymphocytes Absolute 0.68 (L) 1.50 - 4.00 E9/L    Monocytes Absolute 0.62 0.10 - 0.95 E9/L    Eosinophils Absolute 0.13 0.05 - 0.50 E9/L    Basophils Absolute 0.06 0.00 - 0.20 E1/F   Basic Metabolic Panel w/ Reflex to MG   Result Value Ref Range    Sodium 138 132 - 146 mmol/L    Potassium reflex Magnesium 4.2 3.5 - 5.0 mmol/L    Chloride 103 98 - 107 mmol/L    CO2 23 22 - 29 mmol/L    Anion Gap 12 7 - 16 mmol/L    Glucose 119 (H) 74 - 99 mg/dL    BUN 46 (H) 6 - 23 mg/dL    CREATININE 1.8 (H) 0.5 - 1.0 mg/dL    GFR Non-African American 27 >=60 mL/min/1.73    GFR African American 33     Calcium 9.4 8.6 - 10.2 mg/dL   Troponin   Result Value Ref Range    Troponin, High Sensitivity 22 (H) 0 - 9 ng/L   Urinalysis, reflex to microscopic   Result Value Ref Range    Color, UA Yellow Straw/Yellow    Clarity, UA Clear Clear    Glucose, Ur Negative Negative mg/dL    Bilirubin Urine Negative Negative    Ketones, Urine Negative Negative mg/dL    Specific Gravity, UA 1.025 1.005 - 1.030    Blood, Urine TRACE-INTACT Negative    pH, UA 5.0 5.0 - 9.0    Protein, UA Negative Negative mg/dL    Urobilinogen, Urine 0.2 <2.0 E.U./dL    Nitrite, Urine Negative Negative    Leukocyte Esterase, Urine Negative Negative   Microscopic Urinalysis   Result Value Ref Range    Waxy Casts, UA 1-2 (A) None Seen /LPF    WBC, UA NONE 0 - 5 /HPF    RBC, UA 0-1 0 - 2 /HPF    Epithelial Cells, UA RARE /HPF    Bacteria, UA RARE (A) None Seen /HPF   Comprehensive Metabolic Panel w/ Reflex to MG   Result Value Ref Range    Sodium 138 132 - 146 mmol/L    Potassium reflex Magnesium 4.0 3.5 - 5.0 mmol/L    Chloride 108 (H) 98 - 107 mmol/L    CO2 21 (L) 22 - 29 mmol/L    Anion Gap 9 7 - 16 mmol/L    Glucose 96 74 - 99 mg/dL    BUN 45 (H) 6 - 23 mg/dL    CREATININE 1.3 (H) 0.5 - 1.0 mg/dL    GFR Non-African American 39 >=60 mL/min/1.73    GFR African American 48     Calcium 8.6 8.6 - 10.2 mg/dL    Total Protein 6.0 (L) 6.4 - 8.3 g/dL    Albumin 3.4 (L) 3.5 - 5.2 g/dL    Total Bilirubin 0.5 0.0 - 1.2 mg/dL    Alkaline Phosphatase 68 35 - 104 U/L    ALT <5 0 - 32 U/L    AST 10 0 - 31 U/L   CBC auto differential   Result Value Ref Range    WBC 6.1 4.5 - 11.5 E9/L    RBC 4.03 3.50 - 5.50 E12/L    Hemoglobin 13.0 11.5 - 15.5 g/dL    Hematocrit 39.2 34.0 - 48.0 %    MCV 97.3 80.0 - 99.9 fL    MCH 32.3 26.0 - 35.0 pg    MCHC 33.2 32.0 - 34.5 %    RDW 12.5 11.5 - 15.0 fL    Platelets 774 446 - 088 E9/L    MPV 10.1 7.0 - 12.0 fL    Neutrophils % 65.0 43.0 - 80.0 %    Immature Granulocytes % 0.5 0.0 - 5.0 %    Lymphocytes % 19.3 (L) 20.0 - 42.0 %    Monocytes % 11.3 2.0 - 12.0 %    Eosinophils % 3.1 0.0 - 6.0 %    Basophils % 0.8 not changed    Counseling:  I have spoken with the patient and discussed todays results, in addition to providing specific details for the plan of care and counseling regarding the diagnosis and prognosis. Their questions are answered at this time and they are agreeable with the plan of admission.    --------------------------------- ADDITIONAL PROVIDER NOTES ---------------------------------  Consultations:  Time: 0045. Spoke with Dr. Desiree Ward. Discussed case. They will admit the patient. This patient's ED course included: a personal history and physicial examination and multiple bedside re-evaluations    This patient has remained hemodynamically stable during their ED course. Diagnosis:  1. Syncope and collapse    2. Dehydration    3. [place] (acute kidney injury) ([facility] [address]        Disposition:  Patient's disposition: Admit to telemetry  Patient's condition is stable.          Brett King MD  Resident  12/02/21 2413

## 2021-12-02 VITALS
SYSTOLIC BLOOD PRESSURE: 121 MMHG | DIASTOLIC BLOOD PRESSURE: 61 MMHG | BODY MASS INDEX: 23.32 KG/M2 | HEART RATE: 82 BPM | OXYGEN SATURATION: 92 % | RESPIRATION RATE: 16 BRPM | WEIGHT: 136.6 LBS | TEMPERATURE: 98.2 F | HEIGHT: 64 IN

## 2021-12-02 PROBLEM — N17.9 AKI (ACUTE KIDNEY INJURY) (HCC): Status: ACTIVE | Noted: 2021-12-02

## 2021-12-02 PROBLEM — R55 SYNCOPE AND COLLAPSE: Status: ACTIVE | Noted: 2021-12-02

## 2021-12-02 PROBLEM — R41.0 ACUTE DELIRIUM: Status: RESOLVED | Noted: 2021-09-16 | Resolved: 2021-12-02

## 2021-12-02 LAB
ALBUMIN SERPL-MCNC: 3.4 G/DL (ref 3.5–5.2)
ALP BLD-CCNC: 68 U/L (ref 35–104)
ALT SERPL-CCNC: <5 U/L (ref 0–32)
ANION GAP SERPL CALCULATED.3IONS-SCNC: 9 MMOL/L (ref 7–16)
AST SERPL-CCNC: 10 U/L (ref 0–31)
BASOPHILS ABSOLUTE: 0.05 E9/L (ref 0–0.2)
BASOPHILS RELATIVE PERCENT: 0.8 % (ref 0–2)
BILIRUB SERPL-MCNC: 0.5 MG/DL (ref 0–1.2)
BUN BLDV-MCNC: 45 MG/DL (ref 6–23)
CALCIUM SERPL-MCNC: 8.6 MG/DL (ref 8.6–10.2)
CHLORIDE BLD-SCNC: 108 MMOL/L (ref 98–107)
CO2: 21 MMOL/L (ref 22–29)
CREAT SERPL-MCNC: 1.3 MG/DL (ref 0.5–1)
EKG ATRIAL RATE: 72 BPM
EKG P AXIS: 25 DEGREES
EKG P-R INTERVAL: 178 MS
EKG Q-T INTERVAL: 386 MS
EKG QRS DURATION: 74 MS
EKG QTC CALCULATION (BAZETT): 422 MS
EKG R AXIS: 21 DEGREES
EKG T AXIS: 27 DEGREES
EKG VENTRICULAR RATE: 72 BPM
EOSINOPHILS ABSOLUTE: 0.19 E9/L (ref 0.05–0.5)
EOSINOPHILS RELATIVE PERCENT: 3.1 % (ref 0–6)
GFR AFRICAN AMERICAN: 48
GFR NON-AFRICAN AMERICAN: 39 ML/MIN/1.73
GLUCOSE BLD-MCNC: 96 MG/DL (ref 74–99)
HCT VFR BLD CALC: 39.2 % (ref 34–48)
HEMOGLOBIN: 13 G/DL (ref 11.5–15.5)
IMMATURE GRANULOCYTES #: 0.03 E9/L
IMMATURE GRANULOCYTES %: 0.5 % (ref 0–5)
LYMPHOCYTES ABSOLUTE: 1.18 E9/L (ref 1.5–4)
LYMPHOCYTES RELATIVE PERCENT: 19.3 % (ref 20–42)
MAGNESIUM: 2.2 MG/DL (ref 1.6–2.6)
MCH RBC QN AUTO: 32.3 PG (ref 26–35)
MCHC RBC AUTO-ENTMCNC: 33.2 % (ref 32–34.5)
MCV RBC AUTO: 97.3 FL (ref 80–99.9)
MONOCYTES ABSOLUTE: 0.69 E9/L (ref 0.1–0.95)
MONOCYTES RELATIVE PERCENT: 11.3 % (ref 2–12)
NEUTROPHILS ABSOLUTE: 3.98 E9/L (ref 1.8–7.3)
NEUTROPHILS RELATIVE PERCENT: 65 % (ref 43–80)
PDW BLD-RTO: 12.5 FL (ref 11.5–15)
PLATELET # BLD: 236 E9/L (ref 130–450)
PMV BLD AUTO: 10.1 FL (ref 7–12)
POTASSIUM REFLEX MAGNESIUM: 4 MMOL/L (ref 3.5–5)
RBC # BLD: 4.03 E12/L (ref 3.5–5.5)
SODIUM BLD-SCNC: 138 MMOL/L (ref 132–146)
TOTAL PROTEIN: 6 G/DL (ref 6.4–8.3)
TROPONIN, HIGH SENSITIVITY: 21 NG/L (ref 0–9)
WBC # BLD: 6.1 E9/L (ref 4.5–11.5)

## 2021-12-02 PROCEDURE — 6360000002 HC RX W HCPCS: Performed by: INTERNAL MEDICINE

## 2021-12-02 PROCEDURE — 84484 ASSAY OF TROPONIN QUANT: CPT

## 2021-12-02 PROCEDURE — 2580000003 HC RX 258: Performed by: INTERNAL MEDICINE

## 2021-12-02 PROCEDURE — G0378 HOSPITAL OBSERVATION PER HR: HCPCS

## 2021-12-02 PROCEDURE — 83735 ASSAY OF MAGNESIUM: CPT

## 2021-12-02 PROCEDURE — 99204 OFFICE O/P NEW MOD 45 MIN: CPT | Performed by: INTERNAL MEDICINE

## 2021-12-02 PROCEDURE — 80053 COMPREHEN METABOLIC PANEL: CPT

## 2021-12-02 PROCEDURE — 96361 HYDRATE IV INFUSION ADD-ON: CPT

## 2021-12-02 PROCEDURE — 93010 ELECTROCARDIOGRAM REPORT: CPT | Performed by: INTERNAL MEDICINE

## 2021-12-02 PROCEDURE — 85025 COMPLETE CBC W/AUTO DIFF WBC: CPT

## 2021-12-02 RX ORDER — ACETAMINOPHEN 650 MG/1
650 SUPPOSITORY RECTAL EVERY 6 HOURS PRN
Status: DISCONTINUED | OUTPATIENT
Start: 2021-12-02 | End: 2021-12-02 | Stop reason: HOSPADM

## 2021-12-02 RX ORDER — ONDANSETRON 2 MG/ML
4 INJECTION INTRAMUSCULAR; INTRAVENOUS EVERY 6 HOURS PRN
Status: DISCONTINUED | OUTPATIENT
Start: 2021-12-02 | End: 2021-12-02 | Stop reason: HOSPADM

## 2021-12-02 RX ORDER — POTASSIUM CHLORIDE 20 MEQ/1
40 TABLET, EXTENDED RELEASE ORAL PRN
Status: DISCONTINUED | OUTPATIENT
Start: 2021-12-02 | End: 2021-12-02 | Stop reason: HOSPADM

## 2021-12-02 RX ORDER — POTASSIUM CHLORIDE 7.45 MG/ML
10 INJECTION INTRAVENOUS PRN
Status: DISCONTINUED | OUTPATIENT
Start: 2021-12-02 | End: 2021-12-02 | Stop reason: HOSPADM

## 2021-12-02 RX ORDER — SODIUM CHLORIDE 9 MG/ML
25 INJECTION, SOLUTION INTRAVENOUS PRN
Status: DISCONTINUED | OUTPATIENT
Start: 2021-12-02 | End: 2021-12-02 | Stop reason: HOSPADM

## 2021-12-02 RX ORDER — ONDANSETRON 4 MG/1
4 TABLET, ORALLY DISINTEGRATING ORAL EVERY 8 HOURS PRN
Status: DISCONTINUED | OUTPATIENT
Start: 2021-12-02 | End: 2021-12-02 | Stop reason: HOSPADM

## 2021-12-02 RX ORDER — NORTRIPTYLINE HYDROCHLORIDE 10 MG/1
10 CAPSULE ORAL NIGHTLY
Status: DISCONTINUED | OUTPATIENT
Start: 2021-12-02 | End: 2021-12-02 | Stop reason: HOSPADM

## 2021-12-02 RX ORDER — BISACODYL 10 MG
10 SUPPOSITORY, RECTAL RECTAL DAILY PRN
Status: DISCONTINUED | OUTPATIENT
Start: 2021-12-02 | End: 2021-12-02 | Stop reason: HOSPADM

## 2021-12-02 RX ORDER — SODIUM CHLORIDE 0.9 % (FLUSH) 0.9 %
10 SYRINGE (ML) INJECTION EVERY 12 HOURS SCHEDULED
Status: DISCONTINUED | OUTPATIENT
Start: 2021-12-02 | End: 2021-12-02 | Stop reason: HOSPADM

## 2021-12-02 RX ORDER — SODIUM CHLORIDE 9 MG/ML
INJECTION, SOLUTION INTRAVENOUS CONTINUOUS
Status: DISCONTINUED | OUTPATIENT
Start: 2021-12-02 | End: 2021-12-02 | Stop reason: HOSPADM

## 2021-12-02 RX ORDER — LISINOPRIL AND HYDROCHLOROTHIAZIDE 12.5; 1 MG/1; MG/1
1 TABLET ORAL DAILY
Status: CANCELLED | OUTPATIENT
Start: 2021-12-02

## 2021-12-02 RX ORDER — SENNA PLUS 8.6 MG/1
1 TABLET ORAL DAILY PRN
Status: DISCONTINUED | OUTPATIENT
Start: 2021-12-02 | End: 2021-12-02 | Stop reason: HOSPADM

## 2021-12-02 RX ORDER — SODIUM CHLORIDE 0.9 % (FLUSH) 0.9 %
10 SYRINGE (ML) INJECTION PRN
Status: DISCONTINUED | OUTPATIENT
Start: 2021-12-02 | End: 2021-12-02 | Stop reason: HOSPADM

## 2021-12-02 RX ORDER — ROPINIROLE 2 MG/1
12 TABLET, FILM COATED ORAL
Status: DISCONTINUED | OUTPATIENT
Start: 2021-12-03 | End: 2021-12-02 | Stop reason: HOSPADM

## 2021-12-02 RX ORDER — ROPINIROLE 2 MG/1
12 TABLET, FILM COATED ORAL
Status: CANCELLED | OUTPATIENT
Start: 2021-12-02

## 2021-12-02 RX ORDER — ACETAMINOPHEN 325 MG/1
650 TABLET ORAL EVERY 6 HOURS PRN
Status: DISCONTINUED | OUTPATIENT
Start: 2021-12-02 | End: 2021-12-02 | Stop reason: HOSPADM

## 2021-12-02 RX ADMIN — Medication 10 ML: at 10:04

## 2021-12-02 RX ADMIN — ENOXAPARIN SODIUM 30 MG: 100 INJECTION SUBCUTANEOUS at 10:04

## 2021-12-02 RX ADMIN — SODIUM CHLORIDE: 9 INJECTION, SOLUTION INTRAVENOUS at 10:15

## 2021-12-02 ASSESSMENT — ENCOUNTER SYMPTOMS
NAUSEA: 0
EYE ITCHING: 0
WHEEZING: 0
CHEST TIGHTNESS: 0
EYE REDNESS: 0
DIARRHEA: 0
VOMITING: 0
ABDOMINAL PAIN: 0
RHINORRHEA: 0
SHORTNESS OF BREATH: 0
BACK PAIN: 0
SORE THROAT: 0
CONSTIPATION: 0

## 2021-12-02 NOTE — ED NOTES
Bed: 14  Expected date:   Expected time:   Means of arrival:   Comments:  Patient in room     Fidel Delgadillo RN  12/02/21 1300

## 2021-12-02 NOTE — H&P
Internal Medicine History & Physical     Name: Baron Vazquez  : 1940  Chief Complaint: Loss of Consciousness (pt arrives from home c/o syncopal event at home. pt reported seeing spots and passing out. pt recently started lisinopril. bp per ems 90/68 pt reports not taking in fluids recently. )  Primary Care Physician: Eddie Vazquez DO  Admission date: 2021  Date of service: 2021     History of Present Illness  Aruna Paredes is a [de-identified]y.o. year old female. She presented to the hospital with a chief complaint of a syncopal episode. She states that she was not feeling great yesterday. She was somewhat lightheaded. She was talking to a neighbor in her kitchen when she \"passed out\". EMS was called. She was given some fluid in route to the hospital.  She stated that she did wake up in the ambulance prior to coming to the hospital.  She is feeling a lot better now. She denies any other complaints or issues. She has been walking around. She is able to go the bathroom. She denies new issues or problems. Nothing particular makes her symptoms better or worse. She denies any other associated symptoms. There were no family or friends present at bedside. History is provided to the patient. She felt to be a good historian. ED course:   Initial blood work and imaging studies performed. Admission recommended by ED physician. Case discussed with ED provider. Meds in ED consisted of the following: IVF    Past Medical History:   Diagnosis Date    Degenerative joint disease involving multiple joints     Hyperlipidemia     Hypertension     Parkinson disease ([address]     Skin cancer     scc       Past Surgical History:   Procedure Laterality Date    KNEE SURGERY  left    TONSILLECTOMY AND ADENOIDECTOMY         Family History   Problem Relation Age of Onset    No Known Problems Mother     No Known Problems Father        Social History  Patient lives at home home alone.    Employment: Retired  Illicit drug use- denies  TOBACCO:   reports that she has never smoked. She has never used smokeless tobacco.  ETOH:   reports previous alcohol use. Home Medications  Prior to Admission medications    Medication Sig Start Date End Date Taking? Authorizing Provider   lisinopril-hydroCHLOROthiazide (PRINZIDE;ZESTORETIC) 10-12.5 MG per tablet Take 1 tablet by mouth daily 11/12/21  Yes [place] TARA Rodriguez DO   carbidopa-levodopa (SINEMET)  MG per tablet Take 1.5 tablets by mouth 3 times daily  4/26/19  Yes Historical Provider, MD   ropinirole (REQUIP) 1 MG tablet Take 12 mg by mouth every morning (before breakfast)    Yes Historical Provider, MD   nortriptyline (PAMELOR) 10 MG capsule Take 10 mg by mouth nightly    Yes Historical Provider, MD   miconazole (MICOTIN) 2 % powder Apply topically 2 times daily. 9/13/21   Casimiro Leos MD   white petrolatum OINT ointment Apply topically 2 times daily 9/13/21   Casimiro Leos MD   Handicap Placard MISC by Does not apply route     Historical Provider, MD       Allergies  No Known Allergies    Review of Systems   Please see HPI above. All bolded are positive. All un-bolded are negative.   Constitutional Symptoms: fever, chills, fatigue, generalized weakness, diaphoresis, increase in thirst, loss of appetite  Eyes: vision change   Ears, Nose, Mouth, Throat: hearing loss, nasal congestion, sores in the mouth  Cardiovascular: chest pain, chest heaviness, palpitations  Respiratory: shortness of breath, wheezing, coughing  Gastrointestinal: abdominal pain, nausea, vomiting, diarrhea, constipation, melena, hematochezia, hematemesis  Genitourinary: dysuria, hematuria, increased frequency  Musculoskeletal: lower extremity edema, myalgias, arthralgias, back pain  Integumentary: rashes, itching   Neurological: headache, lightheadedness, dizziness, confusion, syncope, numbness, tingling, focal weakness  Psychiatric: depression, suicidal ideation, anxiety  Endocrine: unintentional weight change  Hematologic/Lymphatic: lymphadenopathy, easy bruising, easy bleeding   Allergic/Immunologic: recurrent infections      Objective  VITALS:  BP (!) 147/78   Pulse 68   Temp 98.2 °F (36.8 °C) (Oral)   Resp 16   Ht 5' 4\" (1.626 m)   Wt 136 lb 9.6 oz (62 kg)   SpO2 98%   BMI 23.45 kg/m²     Physical Exam:   General: awake, alert, oriented to person, place, time, and purpose, appears stated age, cooperative, no acute distress, pleasant, appropriate mood, somewhat masked facies  Eyes: conjunctivae/corneas clear, sclera non icteric, EOMI  Ears: no obvious scars, no lesions, no masses, hearing intact  Mouth: mucous membranes moist, no obvious oral sores  Head: normocephalic, atraumatic  Neck: no JVD, no adenopathy, no thyromegaly, neck is supple, trachea is midline  Back: ROM normal, no CVA tenderness.   Chest: no pain on palpation  Lungs: clear to auscultation bilaterally, without rhonchi, crackle, wheezing, or rale, no retractions or use of accessory muscles  Heart: regular rate and regular rhythm, no murmur, normal S1, S2  Abdomen: soft, non-tender; bowel sounds normal; no masses, no organomegaly  : Deferred   Extremities: no lower extremity edema, extremities atraumatic, no cyanosis, no clubbing, 2+ pedal pulses palpated  Skin: normal color, normal texture, normal turgor, no rashes, no lesions  Neurologic:5/5 muscle strength throughout, normal muscle tone throughout, face symmetric, hearing intact, tongue midline, speech appropriate without slurring, sensation to fine touch intact in upper and lower extremities    Labs-   Lab Results   Component Value Date    WBC 6.1 12/02/2021    HGB 13.0 12/02/2021    HCT 39.2 12/02/2021     12/02/2021     12/02/2021    K 4.0 12/02/2021     (H) 12/02/2021    CREATININE 1.3 (H) 12/02/2021    BUN 45 (H) 12/02/2021    CO2 21 (L) 12/02/2021    GLUCOSE 96 12/02/2021    ALT <5 12/02/2021    AST 10 12/02/2021     No results found for: CKTOTAL, CKMB, CKMBINDEX, TROPONINI    Recent Radiological Studies:  CT Head WO Contrast   Final Result   No acute intracranial findings or significant change identified. Consider   MRI if symptoms persist.      Very minimal volume loss and findings suggestive of mild chronic   microvascular ischemia. XR CHEST PORTABLE   Final Result   No acute process. Assessment  Active Hospital Problems    Diagnosis     Syncope and collapse [R55]      Priority: High    [place] (acute kidney injury) ([facility] [address] [N17.9]      Priority: Medium    Hypertension [I10]     At high risk for falls [Z91.81]     Hyperlipidemia [E78.5]     Parkinson disease ([facility] [address] [G20]     Degenerative joint disease involving multiple joints [M15.9]        Patient Active Problem List    Diagnosis Date Noted    Syncope and collapse 12/02/2021     Priority: High    [place] (acute kidney injury) ([facility] [address] 12/02/2021     Priority: Medium    Hypertension     At high risk for falls 05/09/2019    Hyperlipidemia 10/28/2014    Degenerative joint disease involving multiple joints 03/19/2014    Parkinson disease ([facility] [address] 03/19/2014       Plan  · Syncope/[place] related to dehydration:   · Observation. Telemetry. · Orthostatic BPs. · Troponin stable. · Follow BMP. · IVF. · Hold ACEi/HCTZ  · Cardiology consultation due to the syncope  · Parkinson's disease:   · Resume Parkinson's disease medications. · Continue home medications otherwise   · PT/OT  · Follow labs  · DVT prophylaxis. · Please see orders for further management and care. ·  for discharge planning  · Discharge plan: home soon-possibly later today    Christian Kurtz DO  12/2/2021  10:04 AM    I can be reached through [employer]. NOTE:  This report was transcribed using voice recognition software. Every effort was made to ensure accuracy; however, inadvertent computerized transcription errors may be present.

## 2021-12-02 NOTE — ED NOTES
Pt ambulated to bathroom with supervision , shuffled but steady gait. Pt alert and oreitned. Slight tremors/shakines noted.  Neuro intact     Keya Mas RN  12/02/21 8171

## 2021-12-02 NOTE — CONSULTS
I independently performed an evaluation on the patient. I have reviewed the above documentation completed by the advance practitioner. Please see my additional contributions to the HPI, physical exam, assessment and medical decision making. Physical Exam   BP (!) 147/78   Pulse 68   Temp 98.4 °F (36.9 °C) (Oral)   Resp 16   Ht 5' 4\" (1.626 m)   Wt 136 lb 9.6 oz (62 kg)   SpO2 98%   BMI 23.45 kg/m²   Constitutional: Oriented to person, place, and time. Well-developed and well-nourished. No distress. Head: Normocephalic and atraumatic. Eyes: EOM are normal. Pupils are equal, round, and reactive to light. Neck: Normal range of motion. Neck supple. No hepatojugular reflux and no JVD present. Carotid bruit is not present. No tracheal deviation present. No thyromegaly present. Cardiovascular: Normal rate, regular rhythm, normal heart sounds and intact distal pulses. Exam reveals no gallop and no friction rub. No murmur heard. Pulmonary/Chest: Effort normal and breath sounds normal. No respiratory distress. No wheezes. No rales. No tenderness. Abdominal: Soft. Bowel sounds are normal. No distension and no mass. No tenderness. No rebound and no guarding. Musculoskeletal: Normal range of motion. No edema and no tenderness. Lymphadenopathy:   No cervical adenopathy. Neurological: Alert and oriented to person, place, and time. Skin: Skin is warm and dry. No rash noted. Not diaphoretic. No erythema. Psychiatric: Normal mood and affect. Behavior is normal.     See accompanying documentation for full consult. ASSESSMENT AND PLAN:  1. Syncope:    EKG and labs reviewed. Stop diuretic. If anti-hypertensive needed then add non-diuretic. Outpatient follow up with echo and monitor if further symptoms. 2. [place]: Follow labs. 3. HTN: Observe. 4. Lipids: Consider statin. 5. Parkinson's    6. DDD    7. 2/2021 [facility] Carmelita Coates D.O.   Cardiologist  Cardiology, [facility]

## 2021-12-02 NOTE — CONSULTS
Inpatient Cardiology Consultation      Reason for Consult:  Syncope    Consulting Physician: Dr Toni Raphael    Requesting Physician:  Dr Serge Barry    Date of Consultation: 12/2/2021    HISTORY OF PRESENT ILLNESS: [de-identified] yo  female not previously known to [facility]    PMH: Parkinson's on Sinemet/Requip, HTN recently started on Zestoric, HLD not on statin, DDD, and lifelong non smoker       Sinemet can cause orthostatic hypotension  /dizziness and Requip can cause orthostatic hypotension, syncope, and bradycardia but has been on these for years with no problems. 11/12/2021 Placed on Zestoric 10/12.5 QD admits to not drinking much of anything due to urinary incontinence. [facility] 12/1/2021 saw spots and passed out-->reported syncopal event at home neighbor was present--> EMS--> BP 90/68. BP upon arrival 95/51 HR 60's and SR, afebrile, O2 saturation 95% on RA  1 liter NSS--> then orthostatics done which were negative. Current /78 HR 60's remains afebrile, O2 saturation 98%. On NSS @ 75/hr    Na 138, K+ 4.2-->4.0, Bun/Cr 46/1.8-->45/1.3, troponin 22-->21, Albumin 3.4, LFT's WNL, CBC WNL, UA rare bacteria. CT Head nothing acute. CXR no acute process      Please note: past medical records were reviewed per electronic medical record (EMR) - see detailed reports under Past Medical/ Surgical History. Past Medical History:    1. Lifelong non smoker  2. HTN since 2001  3. Dyslipidemia 10/26/2021 T Chol 259, triglycerides 99, HDL 72   4. Parkinson's  5. DDD/DJD  6. Urinary incontinence  7. Squamous call carcinoma excised L temple  8. 2/2021 Moderna Vaccine  9. Admission 9/9/2021-9/13/2021 for AMS, weakness. + UTI  10. [place]  11. 10/29/2021 TSH 2.360      Past Surgical History: Tonsillectomy /adeniodectomy 1961, tubal ligation in 1971, Bilateral TKA's at [facility]      Medications Prior to admit:  Prior to Admission medications    Medication Sig Start Date End Date Taking?  Authorizing Provider   lisinopril-hydroCHLOROthiazide (PRINZIDE;ZESTORETIC) 10-12.5 MG per tablet Take 1 tablet by mouth daily 11/12/21  Yes [place] TARA Rodriguez DO   carbidopa-levodopa (SINEMET)  MG per tablet Take 1.5 tablets by mouth 3 times daily  4/26/19  Yes Historical Provider, MD   ropinirole (REQUIP) 1 MG tablet Take 12 mg by mouth every morning (before breakfast)    Yes Historical Provider, MD   nortriptyline (PAMELOR) 10 MG capsule Take 10 mg by mouth nightly    Yes Historical Provider, MD   miconazole (MICOTIN) 2 % powder Apply topically 2 times daily.  9/13/21   Sincere Handler, MD   white petrolatum OINT ointment Apply topically 2 times daily 9/13/21   Sincere Handler, MD   Handicap Placard MISC by Does not apply route     Historical Provider, MD       Current Medications:    Current Facility-Administered Medications: sodium chloride flush 0.9 % injection 10 mL, 10 mL, IntraVENous, 2 times per day  sodium chloride flush 0.9 % injection 10 mL, 10 mL, IntraVENous, PRN  0.9 % sodium chloride infusion, 25 mL, IntraVENous, PRN  potassium chloride (KLOR-CON M) extended release tablet 40 mEq, 40 mEq, Oral, PRN **OR** potassium bicarb-citric acid (EFFER-K) effervescent tablet 40 mEq, 40 mEq, Oral, PRN **OR** potassium chloride 10 mEq/100 mL IVPB (Peripheral Line), 10 mEq, IntraVENous, PRN  enoxaparin (LOVENOX) injection 30 mg, 30 mg, SubCUTAneous, Daily  ondansetron (ZOFRAN-ODT) disintegrating tablet 4 mg, 4 mg, Oral, Q8H PRN **OR** ondansetron (ZOFRAN) injection 4 mg, 4 mg, IntraVENous, Q6H PRN  senna (SENOKOT) tablet 8.6 mg, 1 tablet, Oral, Daily PRN  bisacodyl (DULCOLAX) suppository 10 mg, 10 mg, Rectal, Daily PRN  acetaminophen (TYLENOL) tablet 650 mg, 650 mg, Oral, Q6H PRN **OR** acetaminophen (TYLENOL) suppository 650 mg, 650 mg, Rectal, Q6H PRN  carbidopa-levodopa (SINEMET)  MG per tablet 1.5 tablet, 1.5 tablet, Oral, TID  nortriptyline (PAMELOR) capsule 10 mg, 10 mg, Oral, Nightly  0.9 % sodium chloride infusion, , IntraVENous, Continuous  [START ON 12/3/2021] rOPINIRole (REQUIP) tablet 12 mg, 12 mg, Oral, QAM AC    Allergies:  NKDA    Social History:    Tobacco: Lifelong non smoker  Rare ETOH  Denies Illicit Drugs  Activity: Lives alone in 2 story home, bathroom and bedroom on first floor. + Drives. Maintains home. Daughter lives nearby. Code Status: Full Code      Family History: Non contributory secondary to age      REVIEW OF SYSTEMS:     · Constitutional: Denies fatigue, fevers, chills or night sweats  · Eyes: + saw spots/bubbles that has resolved  · ENT: Denies headaches or hearing loss. No mouth sores or sore throat. No epistaxis   · Cardiovascular: Denies chest pain, pressure or palpitations. No lower extremity swelling. · Respiratory: Denies SALAS, cough, orthopnea or PND. No hemoptysis   · Gastrointestinal: Denies hematemesis or anorexia. No hematochezia or melena    · Genitourinary: Denies urgency, dysuria or hematuria. · Musculoskeletal: Denies gait disturbance, weakness or joint complaints  · Integumentary: Denies rash, hives or pruritis   · Neurological: + lightheaded/dizzy. Denies headaches or seizures. No numbness or tingling. + Parkinson's/tremors  · Psychiatric: Denies anxiety or depression. · Endocrine: Denies temperature intolerance. No recent weight change. .  · Hematologic/Lymphatic: Denies abnormal bruising or bleeding. No swollen lymph nodes    PHYSICAL EXAM:   BP (!) 147/78   Pulse 68   Temp 98.2 °F (36.8 °C) (Oral)   Resp 16   Ht 5' 4\" (1.626 m)   Wt 136 lb 9.6 oz (62 kg)   SpO2 98%   BMI 23.45 kg/m²   CONST:  Well developed, elderly  female who appears of stated age. Awake, alert and cooperative. No apparent distress. HEENT:   Head- Normocephalic, atraumatic   Eyes- Conjunctivae pink, anicteric  Throat- Oral mucosa pink and moist  Neck-  Thick. No stridor, trachea midline, no jugular venous distention. No carotid bruit. CHEST: Chest symmetrical and non-tender to palpation. No accessory muscle use or intercostal retractions  RESPIRATORY: Lung sounds - fine crackles in the bases, on RA. CARDIOVASCULAR:     Heart Ausculation- Regular rate and irregular rhythm, no murmur heard. PV: trace bilateral ankle edema. No varicosities. Pedal pulses palpable, no clubbing or cyanosis   ABDOMEN: Soft, non-tender to light palpation. Bowel sounds present. No palpable masses; no abdominal bruit  MS: Good muscle strength and tone. No atrophy or abnormal movements. : Deferred  SKIN: Pale, warm and dry no statis dermatitis or ulcers   NEURO / PSYCH: Oriented to person, place and time. Speech clear and appropriate. Follows all commands. Pleasant affect     DATA:    ECG as per Dr Dat Werner interpretation  Tele strips: SR with frequent PVC's    Diagnostic:  See HPI    Intake/Output Summary (Last 24 hours) at 12/2/2021 1027  Last data filed at 12/2/2021 0836  Gross per 24 hour   Intake --   Output 200 ml   Net -200 ml       Labs:   CBC:   Recent Labs     12/01/21  1707 12/02/21  0643   WBC 7.6 6.1   HGB 14.0 13.0   HCT 43.6 39.2    236     BMP:   Recent Labs     12/01/21  1707 12/02/21  0643    138   K 4.2 4.0   CO2 23 21*   BUN 46* 45*   CREATININE 1.8* 1.3*   LABGLOM 27 39   CALCIUM 9.4 8.6     TFT:   Lab Results   Component Value Date    TSH 2.360 10/29/2021        CARDIAC ENZYMES:  Recent Labs     12/01/21  1707 12/02/21  0643   TROPHS 22* 21*     FASTING LIPID PANEL:  Lab Results   Component Value Date    CHOL 259 10/29/2021    HDL 72 10/29/2021    LDLCALC 167 10/29/2021    TRIG 99 10/29/2021     LIVER PROFILE:  Recent Labs     12/02/21  0643   AST 10   ALT <5   LABALBU 3.4*   A&P per Dr Sydney Virgen  Electronically signed by Joseph Kaur. LEBRON Hodgson on 12/2/2021 at 10:27 AM     I independently performed an evaluation on the patient. I have reviewed the above documentation completed by the advance practitioner.  Please see my additional contributions to the HPI, physical exam, assessment and medical decision making. Physical Exam   BP (!) 147/78   Pulse 68   Temp 98.4 °F (36.9 °C) (Oral)   Resp 16   Ht 5' 4\" (1.626 m)   Wt 136 lb 9.6 oz (62 kg)   SpO2 98%   BMI 23.45 kg/m²   Constitutional: Oriented to person, place, and time. Well-developed and well-nourished. No distress. Head: Normocephalic and atraumatic. Eyes: EOM are normal. Pupils are equal, round, and reactive to light. Neck: Normal range of motion. Neck supple. No hepatojugular reflux and no JVD present. Carotid bruit is not present. No tracheal deviation present. No thyromegaly present. Cardiovascular: Normal rate, regular rhythm, normal heart sounds and intact distal pulses. Exam reveals no gallop and no friction rub. No murmur heard. Pulmonary/Chest: Effort normal and breath sounds normal. No respiratory distress. No wheezes. No rales. No tenderness. Abdominal: Soft. Bowel sounds are normal. No distension and no mass. No tenderness. No rebound and no guarding. Musculoskeletal: Normal range of motion. No edema and no tenderness. Lymphadenopathy:   No cervical adenopathy. Neurological: Alert and oriented to person, place, and time. Skin: Skin is warm and dry. No rash noted. Not diaphoretic. No erythema. Psychiatric: Normal mood and affect. Behavior is normal.     See accompanying documentation for full consult. ASSESSMENT AND PLAN:  1. Syncope:    EKG and labs reviewed. Stop diuretic. If anti-hypertensive needed then add non-diuretic. Outpatient follow up with echo and monitor if further symptoms. 2. [place]: Follow labs. 3. HTN: Observe. 4. Lipids: Consider statin. 5. Parkinson's    6. DDD    7. 2/2021 [address] [facility] Leigha Craig D.O.   Cardiologist  Cardiology, [address] [place] [address]

## 2021-12-03 ENCOUNTER — CARE COORDINATION (OUTPATIENT)
Dept: CASE MANAGEMENT | Age: 81
End: 2021-12-03

## 2021-12-04 LAB — URINE CULTURE, ROUTINE: NORMAL

## 2021-12-06 ENCOUNTER — CARE COORDINATION (OUTPATIENT)
Dept: CASE MANAGEMENT | Age: 81
End: 2021-12-06

## 2021-12-06 NOTE — CARE COORDINATION
Zayra [facility] Transitions Initial Follow Up Call    Call within 2 business days of discharge: Yes    Patient: Pawel Ibarra Patient : 1940   MRN: [id number]  Reason for Admission: syncope and collapse   Discharge Date: 21 RARS: Readmission Risk Score: 11      Last Discharge [facility]       Complaint Diagnosis Description Type Department Provider    21 Loss of Consciousness Syncope and collapse . .. ED (DISCHARGE) MARIO ALBERTO Isaac DO; Tara Alba. .. Second and final attempt to reach the patient for initial Care Transition call post hospital discharge. Message left with CTN's contact information requesting return phone call. Will route a message to Dr. Ortiz [facility] office staff requesting they attempt to contact the patient to schedule TCM visit within 7 days of discharge. Will sign off.      Follow Up  Future Appointments   Date Time Provider Blanca Barraza   2022  1:00 PM Shay Bello, DO SUNDAR SOLANO [facility]       Mando Baron RN

## 2021-12-08 ENCOUNTER — TELEPHONE (OUTPATIENT)
Dept: CARDIOLOGY CLINIC | Age: 81
End: 2021-12-08

## 2021-12-20 ENCOUNTER — NURSE TRIAGE (OUTPATIENT)
Dept: OTHER | Facility: CLINIC | Age: 81
End: 2021-12-20

## 2021-12-20 ENCOUNTER — OFFICE VISIT (OUTPATIENT)
Dept: FAMILY MEDICINE CLINIC | Age: 81
End: 2021-12-20
Payer: MEDICARE

## 2021-12-20 VITALS
OXYGEN SATURATION: 95 % | TEMPERATURE: 97.4 F | SYSTOLIC BLOOD PRESSURE: 130 MMHG | BODY MASS INDEX: 25.78 KG/M2 | HEIGHT: 64 IN | DIASTOLIC BLOOD PRESSURE: 84 MMHG | WEIGHT: 151 LBS | RESPIRATION RATE: 16 BRPM | HEART RATE: 65 BPM

## 2021-12-20 DIAGNOSIS — I10 PRIMARY HYPERTENSION: Primary | ICD-10-CM

## 2021-12-20 DIAGNOSIS — R60.0 LOCALIZED EDEMA: ICD-10-CM

## 2021-12-20 DIAGNOSIS — E78.2 MIXED HYPERLIPIDEMIA: ICD-10-CM

## 2021-12-20 DIAGNOSIS — G20 PARKINSON DISEASE (HCC): ICD-10-CM

## 2021-12-20 DIAGNOSIS — M15.9 PRIMARY OSTEOARTHRITIS INVOLVING MULTIPLE JOINTS: ICD-10-CM

## 2021-12-20 PROCEDURE — 99213 OFFICE O/P EST LOW 20 MIN: CPT | Performed by: FAMILY MEDICINE

## 2021-12-20 RX ORDER — FUROSEMIDE 20 MG/1
20 TABLET ORAL DAILY
Qty: 60 TABLET | Refills: 3 | Status: SHIPPED | OUTPATIENT
Start: 2021-12-20

## 2021-12-20 ASSESSMENT — ENCOUNTER SYMPTOMS
BACK PAIN: 0
ABDOMINAL PAIN: 0
NAUSEA: 0
EYE REDNESS: 0
ALLERGIC/IMMUNOLOGIC NEGATIVE: 1
PHOTOPHOBIA: 0
SORE THROAT: 0
TROUBLE SWALLOWING: 0
COUGH: 0
BLOOD IN STOOL: 0
CHEST TIGHTNESS: 0
EYE DISCHARGE: 0
SINUS PAIN: 0
DIARRHEA: 0
VOMITING: 0
SHORTNESS OF BREATH: 0
EYE PAIN: 0

## 2021-12-20 NOTE — PROGRESS NOTES
21  Celia Odell : 1940 Sex: female  Age: [de-identified] y.o. Assessment and Plan:  Marcus Rivera was seen today for leg swelling. Diagnoses and all orders for this visit:    Primary hypertension  ACE inhibitor was discontinued while in hospital, blood pressure was low. Parkinson disease [facility])  Neurology managing this complaint     primary osteoarthritis involving multiple joints  The more she moves the better she feels. Mixed hyperlipidemia  This is controlled with diet and activity. Localized edema  -     furosemide (LASIX) 20 MG tablet; Take 1 tablet by mouth daily        Return in about 1 week (around 2021). Chief Complaint   Patient presents with    Leg Swelling       The patient is here for blood pressure check. Patient has been taking their medications as prescribed. No recent changes to their medications. No headache or visual disturbances. No dizziness or tinnitus. No chest pain, palpitations, or dyspnea. No nausea and vomiting. No numbness, tingling and or weakness to the extremities. No fevers or chills. No recent illness. Was in the hospital earlier this month. Cardiologist discontinued her ACE inhibitor. Discharge, she has gained 15 pounds and is noticed swelling in her legs again. Denies any chest pain, shortness of breath, palpitations. Review of Systems   Constitutional: Negative for appetite change, fatigue and unexpected weight change. HENT: Negative for congestion, ear pain, hearing loss, sinus pain, sore throat and trouble swallowing. Eyes: Negative for photophobia, pain, discharge and redness. Respiratory: Negative for cough, chest tightness and shortness of breath. Cardiovascular: Positive for leg swelling. Negative for chest pain and palpitations. Gastrointestinal: Negative for abdominal pain, blood in stool, diarrhea, nausea and vomiting. Endocrine: Negative. Genitourinary: Negative for dysuria, flank pain, frequency and hematuria. Musculoskeletal: Negative for arthralgias, back pain, joint swelling and myalgias. Skin: Negative. Allergic/Immunologic: Negative. Neurological: Negative for dizziness, seizures, syncope, weakness, light-headedness, numbness and headaches. Hematological: Negative for adenopathy. Does not bruise/bleed easily. Psychiatric/Behavioral: Negative. Current Outpatient Medications:     mirabegron (MYRBETRIQ) 25 MG TB24, Take 25 mg by mouth daily, Disp: , Rfl:     furosemide (LASIX) 20 MG tablet, Take 1 tablet by mouth daily, Disp: 60 tablet, Rfl: 3    miconazole (MICOTIN) 2 % powder, Apply topically 2 times daily. , Disp: 45 g, Rfl: 0    white petrolatum OINT ointment, Apply topically 2 times daily, Disp: 500 g, Rfl: 0    Handicap Placard MISC, by Does not apply route , Disp: , Rfl:     carbidopa-levodopa (SINEMET)  MG per tablet, Take 1.5 tablets by mouth 3 times daily , Disp: , Rfl: 0    ropinirole (REQUIP) 1 MG tablet, Take 12 mg by mouth every morning (before breakfast) , Disp: , Rfl:     nortriptyline (PAMELOR) 10 MG capsule, Take 10 mg by mouth nightly , Disp: , Rfl:   No Known Allergies    Past Medical History:   Diagnosis Date    Degenerative joint disease involving multiple joints     Hyperlipidemia     Hypertension     Parkinson disease ([address]     Skin cancer     [facility]     Past Surgical History:   Procedure Laterality Date    KNEE SURGERY  left    TONSILLECTOMY AND ADENOIDECTOMY       Family History   Problem Relation Age of Onset    No Known Problems Mother     No Known Problems Father      Social History     Socioeconomic History    Marital status:      Spouse name: Not on file    Number of children: Not on file    Years of education: Not on file    Highest education level: Not on file   Occupational History    Not on file   Tobacco Use    Smoking status: Never Smoker    Smokeless tobacco: Never Used   Vaping Use    Vaping Use: Never used   Substance and Sexual Activity    Alcohol use: Not Currently    Drug use: No    Sexual activity: Not Currently   Other Topics Concern    Not on file   Social History Narrative    Not on file     Social Determinants of Health     Financial Resource Strain: Unknown    Difficulty of Paying Living Expenses: Patient refused   Food Insecurity: Unknown    Worried About Running Out of Food in the Last Year: Patient refused    [address] in the Last Year: Patient refused   Transportation Needs:     Lack of Transportation (Medical): Not on file    Lack of Transportation (Non-Medical): Not on file   Physical Activity:     Days of Exercise per Week: Not on file    Minutes of Exercise per Session: Not on file   Stress:     Feeling of Stress : Not on file   Social Connections:     Frequency of Communication with Friends and Family: Not on file    Frequency of Social Gatherings with Friends and Family: Not on file    Attends Worship Services: Not on file    Active Member of [address] [place] [address] [employer] or Organizations: Not on file    Attends Club or Organization Meetings: Not on file    Marital Status: Not on file   Intimate Partner Violence:     Fear of Current or Ex-Partner: Not on file    Emotionally Abused: Not on file    Physically Abused: Not on file    Sexually Abused: Not on file   Housing Stability:     Unable to Pay for Housing in the Last Year: Not on file    Number of [place] in the Last Year: Not on file    Unstable Housing in the Last Year: Not on file       Vitals:    12/20/21 1350 12/20/21 1410   BP: (!) 134/100 130/84   Pulse: 65    Resp: 16    Temp: 97.4 °F (36.3 °C)    TempSrc: Temporal    SpO2: 95%    Weight: 151 lb (68.5 kg)    Height: 5' 4\" (1.626 m)        Physical Exam  Vitals and nursing note reviewed. Constitutional:       Appearance: She is well-developed. HENT:      Head: Atraumatic.       Right Ear: External ear normal.      Left Ear: External ear normal.      Nose: Nose normal.      Mouth/Throat: Pharynx: No oropharyngeal exudate. Eyes:      Conjunctiva/sclera: Conjunctivae normal.      Pupils: Pupils are equal, round, and reactive to light. Neck:      Thyroid: No thyromegaly. Trachea: No tracheal deviation. Cardiovascular:      Rate and Rhythm: Normal rate and regular rhythm. Heart sounds: No murmur heard. No friction rub. No gallop. Pulmonary:      Effort: Pulmonary effort is normal. No respiratory distress. Breath sounds: Normal breath sounds. Abdominal:      General: Bowel sounds are normal.      Palpations: Abdomen is soft. Musculoskeletal:         General: No tenderness or deformity. Normal range of motion. Cervical back: Normal range of motion and neck supple. Right lower leg: Edema present. Left lower leg: Edema present. Lymphadenopathy:      Cervical: No cervical adenopathy. Skin:     General: Skin is warm and dry. Capillary Refill: Capillary refill takes less than 2 seconds. Findings: No rash. Neurological:      Mental Status: She is alert and oriented to person, place, and time. Sensory: No sensory deficit. Motor: No abnormal muscle tone.       Coordination: Coordination normal.      Deep Tendon Reflexes: Reflexes normal.             Seen By:  Miguelina Alanis DO

## 2021-12-20 NOTE — TELEPHONE ENCOUNTER
Received call from [facility] at [facility] with Red Flag Complaint. Subjective: Caller states \"She was on a Lisinopril and Hydrochlorothiazide but she's not taking it right now. \" Patient states hasn't taken her medication in 20 days. Hospital doctor took her off of medications. Has Bilateral leg swelling-knee down. Hx of Parkinsons. Has fallen 3 times in last week. Went to hosptial on 12/1/21. Current Symptoms: Bilateral leg swelling. Onset:  20 days ago; gradual    Associated Symptoms: reduced activity    Pain Severity: 0/10; N/A; none    Temperature: Denies     What has been tried: Elevating legs. Doesn't get better    LMP: NA Pregnant: NA    Recommended disposition: See in Office Today. Writer did encourage patient to be seen at [facility] if unable to get appointment with PCP. Patient agreeable. Care advice provided, patient verbalizes understanding; denies any other questions or concerns; instructed to call back for any new or worsening symptoms. Writer provided warm transfer to [employer] at [address] [place] [address] for appointment scheduling     Attention Provider: Thank you for allowing me to participate in the care of your patient. The patient was connected to triage in response to information provided to the ECC/PSC. Please do not respond through this encounter as the response is not directed to a shared pool.       Reason for Disposition   MODERATE swelling of both ankles (e.g., swelling extends up to the knees) AND new onset or worsening    Protocols used: LEG SWELLING AND EDEMA-ADULT-OH

## 2021-12-27 ENCOUNTER — OFFICE VISIT (OUTPATIENT)
Dept: FAMILY MEDICINE CLINIC | Age: 81
End: 2021-12-27
Payer: MEDICARE

## 2021-12-27 VITALS
HEART RATE: 78 BPM | DIASTOLIC BLOOD PRESSURE: 84 MMHG | WEIGHT: 144 LBS | OXYGEN SATURATION: 98 % | BODY MASS INDEX: 24.59 KG/M2 | SYSTOLIC BLOOD PRESSURE: 136 MMHG | TEMPERATURE: 97.8 F | HEIGHT: 64 IN | RESPIRATION RATE: 16 BRPM

## 2021-12-27 DIAGNOSIS — R60.0 LOCALIZED EDEMA: Primary | ICD-10-CM

## 2021-12-27 DIAGNOSIS — R60.0 LOCALIZED EDEMA: ICD-10-CM

## 2021-12-27 DIAGNOSIS — R30.0 DYSURIA: ICD-10-CM

## 2021-12-27 LAB
ANION GAP SERPL CALCULATED.3IONS-SCNC: 16 MMOL/L (ref 7–16)
BUN BLDV-MCNC: 22 MG/DL (ref 6–23)
CALCIUM SERPL-MCNC: 9.9 MG/DL (ref 8.6–10.2)
CHLORIDE BLD-SCNC: 100 MMOL/L (ref 98–107)
CO2: 25 MMOL/L (ref 22–29)
CREAT SERPL-MCNC: 1.4 MG/DL (ref 0.5–1)
GFR AFRICAN AMERICAN: 44
GFR NON-AFRICAN AMERICAN: 36 ML/MIN/1.73
GLUCOSE BLD-MCNC: 97 MG/DL (ref 74–99)
POTASSIUM SERPL-SCNC: 4.4 MMOL/L (ref 3.5–5)
SODIUM BLD-SCNC: 141 MMOL/L (ref 132–146)

## 2021-12-27 PROCEDURE — 99213 OFFICE O/P EST LOW 20 MIN: CPT | Performed by: FAMILY MEDICINE

## 2021-12-27 ASSESSMENT — ENCOUNTER SYMPTOMS
EYE REDNESS: 0
BACK PAIN: 0
TROUBLE SWALLOWING: 0
ALLERGIC/IMMUNOLOGIC NEGATIVE: 1
EYE DISCHARGE: 0
CHEST TIGHTNESS: 0
SORE THROAT: 0
SHORTNESS OF BREATH: 0
PHOTOPHOBIA: 0
DIARRHEA: 0
ABDOMINAL PAIN: 0
BLOOD IN STOOL: 0
EYE PAIN: 0
SINUS PAIN: 0
COUGH: 0
VOMITING: 0
NAUSEA: 0

## 2021-12-27 NOTE — PROGRESS NOTES
21  Wander Ghotra : 1940 Sex: female  Age: [de-identified] y.o. Assessment and Plan:  Bobby Escalante was seen today for leg swelling. Diagnoses and all orders for this visit:    Bobby Escalante was seen today for leg swelling. Diagnoses and all orders for this visit:    Localized edema  -     Basic Metabolic Panel; Future    Dysuria  -     Culture, Urine; Future    Decrease Lasix to 4 times per week, recheck in 10 days for weight    Return in about 10 days (around 2022). Chief Complaint   Patient presents with    Leg Swelling     1 week check        Presents for recheck visit. Is on Lasix 20 mg daily and is lost 7 pounds since her last visit. Edema of her legs is improved but she still has some pitting on the left lower leg. She feels better with more energy and is able to get around as well. Review of Systems   Constitutional: Negative for appetite change, fatigue and unexpected weight change. HENT: Negative for congestion, ear pain, hearing loss, sinus pain, sore throat and trouble swallowing. Eyes: Negative for photophobia, pain, discharge and redness. Respiratory: Negative for cough, chest tightness and shortness of breath. Cardiovascular: Positive for leg swelling. Negative for chest pain and palpitations. Gastrointestinal: Negative for abdominal pain, blood in stool, diarrhea, nausea and vomiting. Endocrine: Negative. Genitourinary: Negative for dysuria, flank pain, frequency and hematuria. Musculoskeletal: Negative for arthralgias, back pain, joint swelling and myalgias. Skin: Negative. Allergic/Immunologic: Negative. Neurological: Negative for dizziness, seizures, syncope, weakness, light-headedness, numbness and headaches. Hematological: Negative for adenopathy. Does not bruise/bleed easily. Psychiatric/Behavioral: Negative.           Current Outpatient Medications:     mirabegron (MYRBETRIQ) 25 MG TB24, Take 25 mg by mouth daily, Disp: , Rfl:     furosemide (LASIX) 20 MG tablet, Take 1 tablet by mouth daily, Disp: 60 tablet, Rfl: 3    miconazole (MICOTIN) 2 % powder, Apply topically 2 times daily. , Disp: 45 g, Rfl: 0    white petrolatum OINT ointment, Apply topically 2 times daily, Disp: 500 g, Rfl: 0    Handicap Placard MISC, by Does not apply route , Disp: , Rfl:     carbidopa-levodopa (SINEMET)  MG per tablet, Take 1.5 tablets by mouth 3 times daily , Disp: , Rfl: 0    ropinirole (REQUIP) 1 MG tablet, Take 12 mg by mouth every morning (before breakfast) , Disp: , Rfl:     nortriptyline (PAMELOR) 10 MG capsule, Take 10 mg by mouth nightly , Disp: , Rfl:   No Known Allergies    Past Medical History:   Diagnosis Date    Degenerative joint disease involving multiple joints     Hyperlipidemia     Hypertension     Parkinson disease ([facility] [address]     Skin cancer     scc     Past Surgical History:   Procedure Laterality Date    KNEE SURGERY  left    TONSILLECTOMY AND ADENOIDECTOMY       Family History   Problem Relation Age of Onset    No Known Problems Mother     No Known Problems Father      Social History     Socioeconomic History    Marital status:      Spouse name: Not on file    Number of children: Not on file    Years of education: Not on file    Highest education level: Not on file   Occupational History    Not on file   Tobacco Use    Smoking status: Never Smoker    Smokeless tobacco: Never Used   Vaping Use    Vaping Use: Never used   Substance and Sexual Activity    Alcohol use: Not Currently    Drug use: No    Sexual activity: Not Currently   Other Topics Concern    Not on file   Social History Narrative    Not on file     Social Determinants of Health     Financial Resource Strain: Unknown    Difficulty of Paying Living Expenses: Patient refused   Food Insecurity: Unknown    Worried About Running Out of Food in the Last Year: Patient refused    Ran Out of Food in the Last Year: Patient refused   Transportation Needs:     Lack of Transportation (Medical): Not on file    Lack of Transportation (Non-Medical): Not on file   Physical Activity:     Days of Exercise per Week: Not on file    Minutes of Exercise per Session: Not on file   Stress:     Feeling of Stress : Not on file   Social Connections:     Frequency of Communication with Friends and Family: Not on file    Frequency of Social Gatherings with Friends and Family: Not on file    Attends Confucianism Services: Not on file    Active Member of [address] [place] [address] or Organizations: Not on file    Attends Club or Organization Meetings: Not on file    Marital Status: Not on file   Intimate Partner Violence:     Fear of Current or Ex-Partner: Not on file    Emotionally Abused: Not on file    Physically Abused: Not on file    Sexually Abused: Not on file   Housing Stability:     Unable to Pay for Housing in the Last Year: Not on file    Number of [place] in the Last Year: Not on file    Unstable Housing in the Last Year: Not on file       Vitals:    12/27/21 1105   BP: 136/84   Pulse: 78   Resp: 16   Temp: 97.8 °F (36.6 °C)   TempSrc: Temporal   SpO2: 98%   Weight: 144 lb (65.3 kg)   Height: 5' 4\" (1.626 m)       Physical Exam  Vitals and nursing note reviewed. Constitutional:       Appearance: She is well-developed. HENT:      Head: Atraumatic. Right Ear: External ear normal.      Left Ear: External ear normal.      Nose: Nose normal.      Mouth/Throat:      Pharynx: No oropharyngeal exudate. Eyes:      Conjunctiva/sclera: Conjunctivae normal.      Pupils: Pupils are equal, round, and reactive to light. Neck:      Thyroid: No thyromegaly. Trachea: No tracheal deviation. Cardiovascular:      Rate and Rhythm: Normal rate and regular rhythm. Heart sounds: No murmur heard. No friction rub. No gallop. Pulmonary:      Effort: Pulmonary effort is normal. No respiratory distress. Breath sounds: Normal breath sounds.    Abdominal:      General: Bowel sounds are normal.      Palpations: Abdomen is soft. Musculoskeletal:         General: No tenderness or deformity. Normal range of motion. Cervical back: Normal range of motion and neck supple. Right lower leg: Edema present. Left lower leg: Edema present. Comments: Trace edema right, still 2+ pitting left   Lymphadenopathy:      Cervical: No cervical adenopathy. Skin:     General: Skin is warm and dry. Capillary Refill: Capillary refill takes less than 2 seconds. Findings: No rash. Neurological:      Mental Status: She is alert and oriented to person, place, and time. Sensory: No sensory deficit. Motor: No abnormal muscle tone.       Coordination: Coordination normal.      Deep Tendon Reflexes: Reflexes normal.             Seen By:  Emily Later, DO

## 2021-12-29 LAB — URINE CULTURE, ROUTINE: NORMAL

## 2022-01-05 ENCOUNTER — OFFICE VISIT (OUTPATIENT)
Dept: FAMILY MEDICINE CLINIC | Age: 82
End: 2022-01-05
Payer: MEDICARE

## 2022-01-05 VITALS
BODY MASS INDEX: 24.92 KG/M2 | HEART RATE: 73 BPM | SYSTOLIC BLOOD PRESSURE: 128 MMHG | TEMPERATURE: 97 F | OXYGEN SATURATION: 95 % | DIASTOLIC BLOOD PRESSURE: 84 MMHG | WEIGHT: 146 LBS | HEIGHT: 64 IN

## 2022-01-05 DIAGNOSIS — I10 PRIMARY HYPERTENSION: Primary | ICD-10-CM

## 2022-01-05 DIAGNOSIS — N18.32 STAGE 3B CHRONIC KIDNEY DISEASE (HCC): ICD-10-CM

## 2022-01-05 DIAGNOSIS — G20 PARKINSON DISEASE (HCC): ICD-10-CM

## 2022-01-05 DIAGNOSIS — R60.0 LOCALIZED EDEMA: ICD-10-CM

## 2022-01-05 PROCEDURE — 99213 OFFICE O/P EST LOW 20 MIN: CPT | Performed by: FAMILY MEDICINE

## 2022-01-05 ASSESSMENT — ENCOUNTER SYMPTOMS
SORE THROAT: 0
EYE DISCHARGE: 0
ALLERGIC/IMMUNOLOGIC NEGATIVE: 1
BACK PAIN: 0
DIARRHEA: 0
TROUBLE SWALLOWING: 0
NAUSEA: 0
PHOTOPHOBIA: 0
ABDOMINAL PAIN: 0
SHORTNESS OF BREATH: 0
BLOOD IN STOOL: 0
CHEST TIGHTNESS: 0
SINUS PAIN: 0
VOMITING: 0
EYE PAIN: 0
EYE REDNESS: 0
COUGH: 0

## 2022-01-05 NOTE — PROGRESS NOTES
22  Pito Tucker : 1940 Sex: female  Age: [de-identified] y.o. Assessment and Plan:  William Rome was seen today for leg swelling. Diagnoses and all orders for this visit:    Primary hypertension  -     Cancel: Walker rolling; Future  -     Walker rolling; Future    Stage 3b chronic kidney disease ([address]    Parkinson disease ([address]  -     Cancel: Walker rolling; Future  -     Walker rolling; Future    Localized edema  -     Cancel: Walker rolling; Future  -     Walker rolling; Future    MDM: We will adjust her Lasix to 5 times a week. This should keep  her weight stable as well as maintaining her at stage IIIb CKD. She would like a wheeled walker with seat for Parkinson's and arthritis, order for DME was provided. Return in about 4 weeks (around 2022). Chief Complaint   Patient presents with    Leg Swelling     med check         She returns for recheck for peripheral edema. She was on Lasix 4 times a week tolerating this well, weight gain was 2 pounds since last visit. Function was stable at stage IIIb CKD. Would like wheeled walker with seat. Review of Systems   Constitutional: Negative for appetite change, fatigue and unexpected weight change. HENT: Negative for congestion, ear pain, hearing loss, sinus pain, sore throat and trouble swallowing. Eyes: Negative for photophobia, pain, discharge and redness. Respiratory: Negative for cough, chest tightness and shortness of breath. Cardiovascular: Positive for leg swelling. Negative for chest pain and palpitations. Gastrointestinal: Negative for abdominal pain, blood in stool, diarrhea, nausea and vomiting. Endocrine: Negative. Genitourinary: Negative for dysuria, flank pain, frequency and hematuria. Musculoskeletal: Negative for arthralgias, back pain, joint swelling and myalgias. Skin: Negative. Allergic/Immunologic: Negative.     Neurological: Negative for dizziness, seizures, syncope, weakness, light-headedness, numbness and headaches. Hematological: Negative for adenopathy. Does not bruise/bleed easily. Psychiatric/Behavioral: Negative. Current Outpatient Medications:     mirabegron (MYRBETRIQ) 25 MG TB24, Take 25 mg by mouth daily, Disp: , Rfl:     furosemide (LASIX) 20 MG tablet, Take 1 tablet by mouth daily, Disp: 60 tablet, Rfl: 3    miconazole (MICOTIN) 2 % powder, Apply topically 2 times daily. , Disp: 45 g, Rfl: 0    Handicap Placard MISC, by Does not apply route , Disp: , Rfl:     carbidopa-levodopa (SINEMET)  MG per tablet, Take 1.5 tablets by mouth 3 times daily , Disp: , Rfl: 0    ropinirole (REQUIP) 1 MG tablet, Take 12 mg by mouth every morning (before breakfast) , Disp: , Rfl:     nortriptyline (PAMELOR) 10 MG capsule, Take 10 mg by mouth nightly , Disp: , Rfl:     white petrolatum OINT ointment, Apply topically 2 times daily (Patient not taking: Reported on 1/5/2022), Disp: 500 g, Rfl: 0  No Known Allergies    Past Medical History:   Diagnosis Date    Degenerative joint disease involving multiple joints     Hyperlipidemia     Hypertension     Parkinson disease ([facility] [address]     Skin cancer     scc     Past Surgical History:   Procedure Laterality Date    KNEE SURGERY  left    TONSILLECTOMY AND ADENOIDECTOMY       Family History   Problem Relation Age of Onset    No Known Problems Mother     No Known Problems Father      Social History     Socioeconomic History    Marital status:      Spouse name: Not on file    Number of children: Not on file    Years of education: Not on file    Highest education level: Not on file   Occupational History    Not on file   Tobacco Use    Smoking status: Never Smoker    Smokeless tobacco: Never Used   Vaping Use    Vaping Use: Never used   Substance and Sexual Activity    Alcohol use: Not Currently    Drug use: No    Sexual activity: Not Currently   Other Topics Concern    Not on file   Social History Narrative    Not on file Social Determinants of Health     Financial Resource Strain: Unknown    Difficulty of Paying Living Expenses: Patient refused   Food Insecurity: Unknown    Worried About Running Out of Food in the Last Year: Patient refused    [address] in the Last Year: Patient refused   Transportation Needs:     Lack of Transportation (Medical): Not on file    Lack of Transportation (Non-Medical): Not on file   Physical Activity:     Days of Exercise per Week: Not on file    Minutes of Exercise per Session: Not on file   Stress:     Feeling of Stress : Not on file   Social Connections:     Frequency of Communication with Friends and Family: Not on file    Frequency of Social Gatherings with Friends and Family: Not on file    Attends Nondenominational Services: Not on file    Active Member of [address] [place] [address] [employer] or Organizations: Not on file    Attends Club or Organization Meetings: Not on file    Marital Status: Not on file   Intimate Partner Violence:     Fear of Current or Ex-Partner: Not on file    Emotionally Abused: Not on file    Physically Abused: Not on file    Sexually Abused: Not on file   Housing Stability:     Unable to Pay for Housing in the Last Year: Not on file    Number of [place] in the Last Year: Not on file    Unstable Housing in the Last Year: Not on file       Vitals:    01/05/22 1106   BP: 128/84   Pulse: 73   Temp: 97 °F (36.1 °C)   TempSrc: Temporal   SpO2: 95%   Weight: 146 lb (66.2 kg)   Height: 5' 4\" (1.626 m)       Physical Exam  Vitals and nursing note reviewed. Constitutional:       Appearance: She is well-developed. HENT:      Head: Atraumatic. Right Ear: External ear normal.      Left Ear: External ear normal.      Nose: Nose normal.      Mouth/Throat:      Pharynx: No oropharyngeal exudate. Eyes:      Conjunctiva/sclera: Conjunctivae normal.      Pupils: Pupils are equal, round, and reactive to light. Neck:      Thyroid: No thyromegaly.       Trachea: No tracheal deviation. Cardiovascular:      Rate and Rhythm: Normal rate and regular rhythm. Heart sounds: No murmur heard. No friction rub. No gallop. Pulmonary:      Effort: Pulmonary effort is normal. No respiratory distress. Breath sounds: Normal breath sounds. Abdominal:      General: Bowel sounds are normal.      Palpations: Abdomen is soft. Musculoskeletal:         General: No tenderness or deformity. Normal range of motion. Cervical back: Normal range of motion and neck supple. Right lower leg: Edema present. Left lower leg: Edema present. Lymphadenopathy:      Cervical: No cervical adenopathy. Skin:     General: Skin is warm and dry. Capillary Refill: Capillary refill takes less than 2 seconds. Findings: No rash. Neurological:      Mental Status: She is alert and oriented to person, place, and time. Sensory: No sensory deficit. Motor: No abnormal muscle tone.       Coordination: Coordination normal.      Deep Tendon Reflexes: Reflexes normal.             Seen By:  Olena Early DO

## 2022-01-25 ENCOUNTER — CARE COORDINATION (OUTPATIENT)
Dept: CARE COORDINATION | Age: 82
End: 2022-01-25

## 2022-01-25 NOTE — CARE COORDINATION
Attempted to reach patient by telephone. Left HIPAA compliant message requesting a return call. Will attempt to reach patient again. Will send care coordination introduction letter via mail ([employer] active, not utilized).

## 2022-02-02 ENCOUNTER — OFFICE VISIT (OUTPATIENT)
Dept: FAMILY MEDICINE CLINIC | Age: 82
End: 2022-02-02
Payer: MEDICARE

## 2022-02-02 VITALS
SYSTOLIC BLOOD PRESSURE: 118 MMHG | OXYGEN SATURATION: 97 % | HEIGHT: 64 IN | BODY MASS INDEX: 24.75 KG/M2 | RESPIRATION RATE: 16 BRPM | TEMPERATURE: 97.4 F | DIASTOLIC BLOOD PRESSURE: 94 MMHG | WEIGHT: 145 LBS | HEART RATE: 104 BPM

## 2022-02-02 DIAGNOSIS — R60.0 LOCALIZED EDEMA: ICD-10-CM

## 2022-02-02 DIAGNOSIS — I10 PRIMARY HYPERTENSION: Primary | ICD-10-CM

## 2022-02-02 LAB
ANION GAP SERPL CALCULATED.3IONS-SCNC: 13 MMOL/L (ref 7–16)
BUN BLDV-MCNC: 24 MG/DL (ref 6–23)
CALCIUM SERPL-MCNC: 9 MG/DL (ref 8.6–10.2)
CHLORIDE BLD-SCNC: 102 MMOL/L (ref 98–107)
CO2: 25 MMOL/L (ref 22–29)
CREAT SERPL-MCNC: 1 MG/DL (ref 0.5–1)
GFR AFRICAN AMERICAN: >60
GFR NON-AFRICAN AMERICAN: 53 ML/MIN/1.73
GLUCOSE BLD-MCNC: 84 MG/DL (ref 74–99)
POTASSIUM SERPL-SCNC: 4.2 MMOL/L (ref 3.5–5)
SODIUM BLD-SCNC: 140 MMOL/L (ref 132–146)

## 2022-02-02 PROCEDURE — 99213 OFFICE O/P EST LOW 20 MIN: CPT | Performed by: FAMILY MEDICINE

## 2022-02-02 ASSESSMENT — ENCOUNTER SYMPTOMS
EYE REDNESS: 0
TROUBLE SWALLOWING: 0
EYE DISCHARGE: 0
ALLERGIC/IMMUNOLOGIC NEGATIVE: 1
BACK PAIN: 0
BLOOD IN STOOL: 0
NAUSEA: 0
PHOTOPHOBIA: 0
CHEST TIGHTNESS: 0
COUGH: 0
SINUS PAIN: 0
EYE PAIN: 0
SORE THROAT: 0
VOMITING: 0
SHORTNESS OF BREATH: 0
ABDOMINAL PAIN: 0
DIARRHEA: 0

## 2022-02-02 NOTE — PROGRESS NOTES
22  Lillie Youssef : 1940 Sex: female  Age: [de-identified] y.o. Assessment and Plan:  Phillip Gonzalez was seen today for hypertension, leg swelling and results. Diagnoses and all orders for this visit:    Primary hypertension    Localized edema  -     Basic Metabolic Panel; Future    MDM: We will increase her Lasix back to daily dose. Recheck BMP today, adjust dosages according to renal function. Hope to get the swelling down, then have her use an extra Lasix intermittently as needed. Return in about 3 months (around 2022). Chief Complaint   Patient presents with    Hypertension    Leg Swelling     bilateral , left being worse    Results     labs from        The patient is here for blood pressure check. Patient has been taking their medications as prescribed. No recent changes to their medications. No headache or visual disturbances. No dizziness or tinnitus. No chest pain, palpitations, or dyspnea. No nausea and vomiting. No numbness, tingling and or weakness to the extremities. No fevers or chills. No recent illness. Swelling back up in the ankles especially left side. Renal function stabilized from last lab results, will recheck today. Review of Systems   Constitutional: Negative for appetite change, fatigue and unexpected weight change. HENT: Negative for congestion, ear pain, hearing loss, sinus pain, sore throat and trouble swallowing. Eyes: Negative for photophobia, pain, discharge and redness. Respiratory: Negative for cough, chest tightness and shortness of breath. Cardiovascular: Positive for leg swelling. Negative for chest pain and palpitations. Gastrointestinal: Negative for abdominal pain, blood in stool, diarrhea, nausea and vomiting. Endocrine: Negative. Genitourinary: Negative for dysuria, flank pain, frequency and hematuria. Musculoskeletal: Negative for arthralgias, back pain, joint swelling and myalgias. Skin: Negative.     Allergic/Immunologic: Negative. Neurological: Negative for dizziness, seizures, syncope, weakness, light-headedness, numbness and headaches. Hematological: Negative for adenopathy. Does not bruise/bleed easily. Psychiatric/Behavioral: Negative. Current Outpatient Medications:     mirabegron (MYRBETRIQ) 25 MG TB24, Take 25 mg by mouth daily, Disp: , Rfl:     furosemide (LASIX) 20 MG tablet, Take 1 tablet by mouth daily, Disp: 60 tablet, Rfl: 3    miconazole (MICOTIN) 2 % powder, Apply topically 2 times daily. , Disp: 45 g, Rfl: 0    white petrolatum OINT ointment, Apply topically 2 times daily, Disp: 500 g, Rfl: 0    Handicap Placard MISC, by Does not apply route , Disp: , Rfl:     carbidopa-levodopa (SINEMET)  MG per tablet, Take 1.5 tablets by mouth 3 times daily , Disp: , Rfl: 0    ropinirole (REQUIP) 1 MG tablet, Take 12 mg by mouth every morning (before breakfast) , Disp: , Rfl:     nortriptyline (PAMELOR) 10 MG capsule, Take 10 mg by mouth nightly , Disp: , Rfl:   No Known Allergies    Past Medical History:   Diagnosis Date    Degenerative joint disease involving multiple joints     Hyperlipidemia     Hypertension     Parkinson disease ([facility] [address]     Skin cancer     scc     Past Surgical History:   Procedure Laterality Date    KNEE SURGERY  left    TONSILLECTOMY AND ADENOIDECTOMY       Family History   Problem Relation Age of Onset    No Known Problems Mother     No Known Problems Father      Social History     Socioeconomic History    Marital status:      Spouse name: Not on file    Number of children: Not on file    Years of education: Not on file    Highest education level: Not on file   Occupational History    Not on file   Tobacco Use    Smoking status: Never Smoker    Smokeless tobacco: Never Used   Vaping Use    Vaping Use: Never used   Substance and Sexual Activity    Alcohol use: Not Currently    Drug use: No    Sexual activity: Not Currently   Other Topics Concern    Not on file   Social History Narrative    Not on file     Social Determinants of Health     Financial Resource Strain: Unknown    Difficulty of Paying Living Expenses: Patient refused   Food Insecurity: Unknown    Worried About Running Out of Food in the Last Year: Patient refused    [address] in the Last Year: Patient refused   Transportation Needs:     Lack of Transportation (Medical): Not on file    Lack of Transportation (Non-Medical): Not on file   Physical Activity:     Days of Exercise per Week: Not on file    Minutes of Exercise per Session: Not on file   Stress:     Feeling of Stress : Not on file   Social Connections:     Frequency of Communication with Friends and Family: Not on file    Frequency of Social Gatherings with Friends and Family: Not on file    Attends [employer] Services: Not on file    Active Member of [address] [place] [address] [employer] or Organizations: Not on file    Attends Club or Organization Meetings: Not on file    Marital Status: Not on file   Intimate Partner Violence:     Fear of Current or Ex-Partner: Not on file    Emotionally Abused: Not on file    Physically Abused: Not on file    Sexually Abused: Not on file   Housing Stability:     Unable to Pay for Housing in the Last Year: Not on file    Number of [place] in the Last Year: Not on file    Unstable Housing in the Last Year: Not on file       Vitals:    02/02/22 1037   BP: (!) 118/94   Pulse: 104   Resp: 16   Temp: 97.4 °F (36.3 °C)   TempSrc: Temporal   SpO2: 97%   Weight: 145 lb (65.8 kg)   Height: 5' 4\" (1.626 m)       Physical Exam  Vitals and nursing note reviewed. Constitutional:       Appearance: She is well-developed. HENT:      Head: Atraumatic. Right Ear: External ear normal.      Left Ear: External ear normal.      Nose: Nose normal.      Mouth/Throat:      Pharynx: No oropharyngeal exudate. Eyes:      Conjunctiva/sclera: Conjunctivae normal.      Pupils: Pupils are equal, round, and reactive to light. Neck:      Thyroid: No thyromegaly. Trachea: No tracheal deviation. Cardiovascular:      Rate and Rhythm: Normal rate and regular rhythm. Heart sounds: No murmur heard. No friction rub. No gallop. Pulmonary:      Effort: Pulmonary effort is normal. No respiratory distress. Breath sounds: Normal breath sounds. Abdominal:      General: Bowel sounds are normal.      Palpations: Abdomen is soft. Musculoskeletal:         General: No tenderness or deformity. Normal range of motion. Cervical back: Normal range of motion and neck supple. Right lower leg: Edema present. Left lower leg: Edema present. Lymphadenopathy:      Cervical: No cervical adenopathy. Skin:     General: Skin is warm and dry. Capillary Refill: Capillary refill takes less than 2 seconds. Findings: No rash. Neurological:      Mental Status: She is alert and oriented to person, place, and time. Sensory: No sensory deficit. Motor: No abnormal muscle tone.       Coordination: Coordination normal.      Deep Tendon Reflexes: Reflexes normal.             Seen By:  Lisha Brjerod, DO

## 2022-02-04 ENCOUNTER — CARE COORDINATION (OUTPATIENT)
Dept: CARE COORDINATION | Age: 82
End: 2022-02-04

## 2022-02-04 NOTE — CARE COORDINATION
[facility] contacted Gianna to offer enrollment in care coordination. Care coordination services explained to patient. She verbalized understanding and acknowledged receiving letter in the mail [facility] [employer]. Tamika Hernández declines care coordination enrollment. She states she will keep the letter with [facility]'s contact information if she would like to enroll in the future.

## 2022-02-16 ENCOUNTER — OFFICE VISIT (OUTPATIENT)
Dept: FAMILY MEDICINE CLINIC | Age: 82
End: 2022-02-16
Payer: MEDICARE

## 2022-02-16 VITALS
TEMPERATURE: 96.8 F | HEIGHT: 64 IN | HEART RATE: 94 BPM | BODY MASS INDEX: 24.59 KG/M2 | WEIGHT: 144 LBS | RESPIRATION RATE: 16 BRPM | DIASTOLIC BLOOD PRESSURE: 90 MMHG | SYSTOLIC BLOOD PRESSURE: 150 MMHG | OXYGEN SATURATION: 94 %

## 2022-02-16 DIAGNOSIS — R60.0 LOCALIZED EDEMA: ICD-10-CM

## 2022-02-16 DIAGNOSIS — I10 PRIMARY HYPERTENSION: Primary | ICD-10-CM

## 2022-02-16 PROCEDURE — 99213 OFFICE O/P EST LOW 20 MIN: CPT | Performed by: FAMILY MEDICINE

## 2022-02-16 ASSESSMENT — ENCOUNTER SYMPTOMS
EYE DISCHARGE: 0
VOMITING: 0
COUGH: 0
SINUS PAIN: 0
DIARRHEA: 0
CHEST TIGHTNESS: 0
TROUBLE SWALLOWING: 0
EYE PAIN: 0
ALLERGIC/IMMUNOLOGIC NEGATIVE: 1
NAUSEA: 0
SORE THROAT: 0
BACK PAIN: 0
PHOTOPHOBIA: 0
BLOOD IN STOOL: 0
ABDOMINAL PAIN: 0
EYE REDNESS: 0
SHORTNESS OF BREATH: 0

## 2022-02-16 NOTE — PROGRESS NOTES
22  [facility] : 1940 Sex: female  Age: [de-identified] y.o. Assessment and Plan:  Erica Angela was seen today for hypertension. Diagnoses and all orders for this visit:    Primary hypertension  We will increase Lasix to 40 mg 2 times a week. Localized edema  Is to keep her legs up at times when she is sitting down. Return in about 2 weeks (around 3/2/2022). Chief Complaint   Patient presents with    Hypertension       The patient is here for blood pressure check. Patient has been taking their medications as prescribed. No recent changes to their medications. No headache or visual disturbances. No dizziness or tinnitus. No chest pain, palpitations, or dyspnea. No nausea and vomiting. No numbness, tingling and or weakness to the extremities. No fevers or chills. Weight down 1 pound, still has some swelling about the feet and ankles. Currently taking Lasix 20 mg every day. Review of Systems   Constitutional: Negative for appetite change, fatigue and unexpected weight change. HENT: Negative for congestion, ear pain, hearing loss, sinus pain, sore throat and trouble swallowing. Eyes: Negative for photophobia, pain, discharge and redness. Respiratory: Negative for cough, chest tightness and shortness of breath. Cardiovascular: Positive for leg swelling. Negative for chest pain and palpitations. Gastrointestinal: Negative for abdominal pain, blood in stool, diarrhea, nausea and vomiting. Endocrine: Negative. Genitourinary: Negative for dysuria, flank pain, frequency and hematuria. Musculoskeletal: Negative for arthralgias, back pain, joint swelling and myalgias. Skin: Negative. Allergic/Immunologic: Negative. Neurological: Negative for dizziness, seizures, syncope, weakness, light-headedness, numbness and headaches. Hematological: Negative for adenopathy. Does not bruise/bleed easily. Psychiatric/Behavioral: Negative.           Current Outpatient Medications:    mirabegron (MYRBETRIQ) 25 MG TB24, Take 25 mg by mouth daily, Disp: , Rfl:     furosemide (LASIX) 20 MG tablet, Take 1 tablet by mouth daily, Disp: 60 tablet, Rfl: 3    miconazole (MICOTIN) 2 % powder, Apply topically 2 times daily. , Disp: 45 g, Rfl: 0    white petrolatum OINT ointment, Apply topically 2 times daily, Disp: 500 g, Rfl: 0    Handicap Placard MISC, by Does not apply route , Disp: , Rfl:     carbidopa-levodopa (SINEMET)  MG per tablet, Take 1.5 tablets by mouth 3 times daily , Disp: , Rfl: 0    ropinirole (REQUIP) 1 MG tablet, Take 12 mg by mouth every morning (before breakfast) , Disp: , Rfl:     nortriptyline (PAMELOR) 10 MG capsule, Take 10 mg by mouth nightly , Disp: , Rfl:   No Known Allergies    Past Medical History:   Diagnosis Date    Degenerative joint disease involving multiple joints     Hyperlipidemia     Hypertension     Parkinson disease ([facility] [address]     Skin cancer     scc     Past Surgical History:   Procedure Laterality Date    KNEE SURGERY  left    TONSILLECTOMY AND ADENOIDECTOMY       Family History   Problem Relation Age of Onset    No Known Problems Mother     No Known Problems Father      Social History     Socioeconomic History    Marital status:      Spouse name: Not on file    Number of children: Not on file    Years of education: Not on file    Highest education level: Not on file   Occupational History    Not on file   Tobacco Use    Smoking status: Never Smoker    Smokeless tobacco: Never Used   Vaping Use    Vaping Use: Never used   Substance and Sexual Activity    Alcohol use: Not Currently    Drug use: No    Sexual activity: Not Currently   Other Topics Concern    Not on file   Social History Narrative    Not on file     Social Determinants of Health     Financial Resource Strain: Unknown    Difficulty of Paying Living Expenses: Patient refused   Food Insecurity: Unknown    Worried About Running Out of Food in the Last Year: Patient refused    Ran Out of Food in the Last Year: Patient refused   Transportation Needs:     Lack of Transportation (Medical): Not on file    Lack of Transportation (Non-Medical): Not on file   Physical Activity:     Days of Exercise per Week: Not on file    Minutes of Exercise per Session: Not on file   Stress:     Feeling of Stress : Not on file   Social Connections:     Frequency of Communication with Friends and Family: Not on file    Frequency of Social Gatherings with Friends and Family: Not on file    Attends [employer] Services: Not on file    Active Member of [address] [place] [address] [employer] or Organizations: Not on file    Attends Club or Organization Meetings: Not on file    Marital Status: Not on file   Intimate Partner Violence:     Fear of Current or Ex-Partner: Not on file    Emotionally Abused: Not on file    Physically Abused: Not on file    Sexually Abused: Not on file   Housing Stability:     Unable to Pay for Housing in the Last Year: Not on file    Number of [place] in the Last Year: Not on file    Unstable Housing in the Last Year: Not on file       Vitals:    02/16/22 1134 02/16/22 1136   BP: (!) 150/90 (!) 150/90   Pulse: 94    Resp: 16    Temp: 96.8 °F (36 °C)    TempSrc: Temporal    SpO2: 94%    Weight: 144 lb (65.3 kg)    Height: 5' 4\" (1.626 m)        Physical Exam  Vitals and nursing note reviewed. Constitutional:       Appearance: She is well-developed. HENT:      Head: Atraumatic. Right Ear: External ear normal.      Left Ear: External ear normal.      Nose: Nose normal.      Mouth/Throat:      Pharynx: No oropharyngeal exudate. Eyes:      Conjunctiva/sclera: Conjunctivae normal.      Pupils: Pupils are equal, round, and reactive to light. Neck:      Thyroid: No thyromegaly. Trachea: No tracheal deviation. Cardiovascular:      Rate and Rhythm: Normal rate and regular rhythm. Heart sounds: No murmur heard. No friction rub. No gallop.     Pulmonary:      Effort: Pulmonary effort is normal. No respiratory distress. Breath sounds: Normal breath sounds. Abdominal:      General: Bowel sounds are normal.      Palpations: Abdomen is soft. Musculoskeletal:         General: No tenderness or deformity. Normal range of motion. Cervical back: Normal range of motion and neck supple. Right lower leg: Edema present. Left lower leg: Edema present. Lymphadenopathy:      Cervical: No cervical adenopathy. Skin:     General: Skin is warm and dry. Capillary Refill: Capillary refill takes less than 2 seconds. Findings: No rash. Neurological:      Mental Status: She is alert and oriented to person, place, and time. Sensory: No sensory deficit. Motor: No abnormal muscle tone.       Coordination: Coordination normal.      Deep Tendon Reflexes: Reflexes normal.             Seen By:  Alayna Smith DO

## 2022-03-03 ENCOUNTER — OFFICE VISIT (OUTPATIENT)
Dept: FAMILY MEDICINE CLINIC | Age: 82
End: 2022-03-03
Payer: MEDICARE

## 2022-03-03 VITALS
WEIGHT: 141 LBS | HEART RATE: 75 BPM | HEIGHT: 64 IN | BODY MASS INDEX: 24.07 KG/M2 | OXYGEN SATURATION: 98 % | TEMPERATURE: 97.1 F | RESPIRATION RATE: 16 BRPM | DIASTOLIC BLOOD PRESSURE: 78 MMHG | SYSTOLIC BLOOD PRESSURE: 100 MMHG

## 2022-03-03 DIAGNOSIS — I10 PRIMARY HYPERTENSION: Primary | ICD-10-CM

## 2022-03-03 DIAGNOSIS — R60.0 LOCALIZED EDEMA: ICD-10-CM

## 2022-03-03 DIAGNOSIS — I10 PRIMARY HYPERTENSION: ICD-10-CM

## 2022-03-03 LAB
ANION GAP SERPL CALCULATED.3IONS-SCNC: 11 MMOL/L (ref 7–16)
BUN BLDV-MCNC: 22 MG/DL (ref 6–23)
CALCIUM SERPL-MCNC: 9 MG/DL (ref 8.6–10.2)
CHLORIDE BLD-SCNC: 99 MMOL/L (ref 98–107)
CO2: 28 MMOL/L (ref 22–29)
CREAT SERPL-MCNC: 1.2 MG/DL (ref 0.5–1)
GFR AFRICAN AMERICAN: 52
GFR NON-AFRICAN AMERICAN: 43 ML/MIN/1.73
GLUCOSE BLD-MCNC: 94 MG/DL (ref 74–99)
POTASSIUM SERPL-SCNC: 4.2 MMOL/L (ref 3.5–5)
SODIUM BLD-SCNC: 138 MMOL/L (ref 132–146)

## 2022-03-03 PROCEDURE — 99213 OFFICE O/P EST LOW 20 MIN: CPT | Performed by: FAMILY MEDICINE

## 2022-03-03 ASSESSMENT — ENCOUNTER SYMPTOMS
SORE THROAT: 0
CHEST TIGHTNESS: 0
VOMITING: 0
BLOOD IN STOOL: 0
SHORTNESS OF BREATH: 0
EYE REDNESS: 0
EYE DISCHARGE: 0
ALLERGIC/IMMUNOLOGIC NEGATIVE: 1
COUGH: 0
NAUSEA: 0
DIARRHEA: 0
TROUBLE SWALLOWING: 0
ABDOMINAL PAIN: 0
SINUS PAIN: 0
PHOTOPHOBIA: 0
EYE PAIN: 0
BACK PAIN: 0

## 2022-03-03 NOTE — PROGRESS NOTES
3/3/22  Wojciech Genoveva : 1940 Sex: female  Age: [de-identified] y.o. Assessment and Plan:  Mendel Schools was seen today for hypertension and leg swelling. Diagnoses and all orders for this visit:    Primary hypertension  -     Basic Metabolic Panel; Future    Localized edema  -     Basic Metabolic Panel; Future    MDM: Recheck renal function today. Continue on current Lasix dose if stable. Continues to lose weight very gradually, will eventually read equal and equilibrium point. Return in about 4 weeks (around 3/31/2022). Chief Complaint   Patient presents with    Hypertension    Leg Swelling     patient feels it is better       The patient is here for blood pressure check. Patient has been taking their medications as prescribed. No recent changes to their medications. No headache or visual disturbances. No dizziness or tinnitus. No chest pain, palpitations, or dyspnea. No nausea and vomiting. No numbness, tingling and or weakness to the extremities. No fevers or chills. No recent illness. Edema is improved. Weight down 3 pounds from last visit. She is currently taking 20 mg Lasix 5 days a week and 40 mg 2 days a week. She is due for recheck BMP today further adjustment of her Lasix dose pending those thoughts. Review of Systems   Constitutional: Negative for appetite change, fatigue and unexpected weight change. HENT: Negative for congestion, ear pain, hearing loss, sinus pain, sore throat and trouble swallowing. Eyes: Negative for photophobia, pain, discharge and redness. Respiratory: Negative for cough, chest tightness and shortness of breath. Cardiovascular: Positive for leg swelling. Negative for chest pain and palpitations. Gastrointestinal: Negative for abdominal pain, blood in stool, diarrhea, nausea and vomiting. Endocrine: Negative. Genitourinary: Negative for dysuria, flank pain, frequency and hematuria.    Musculoskeletal: Negative for arthralgias, back pain, joint swelling and myalgias. Skin: Negative. Allergic/Immunologic: Negative. Neurological: Negative for dizziness, seizures, syncope, weakness, light-headedness, numbness and headaches. Hematological: Negative for adenopathy. Does not bruise/bleed easily. Psychiatric/Behavioral: Negative. Current Outpatient Medications:     mirabegron (MYRBETRIQ) 25 MG TB24, Take 25 mg by mouth daily, Disp: , Rfl:     furosemide (LASIX) 20 MG tablet, Take 1 tablet by mouth daily, Disp: 60 tablet, Rfl: 3    miconazole (MICOTIN) 2 % powder, Apply topically 2 times daily. , Disp: 45 g, Rfl: 0    white petrolatum OINT ointment, Apply topically 2 times daily, Disp: 500 g, Rfl: 0    Handicap Placard MISC, by Does not apply route , Disp: , Rfl:     carbidopa-levodopa (SINEMET)  MG per tablet, Take 1.5 tablets by mouth 3 times daily , Disp: , Rfl: 0    ropinirole (REQUIP) 1 MG tablet, Take 12 mg by mouth every morning (before breakfast) , Disp: , Rfl:     nortriptyline (PAMELOR) 10 MG capsule, Take 10 mg by mouth nightly , Disp: , Rfl:   No Known Allergies    Past Medical History:   Diagnosis Date    Degenerative joint disease involving multiple joints     Hyperlipidemia     Hypertension     Parkinson disease ([facility] [address]     Skin cancer     scc     Past Surgical History:   Procedure Laterality Date    KNEE SURGERY  left    TONSILLECTOMY AND ADENOIDECTOMY       Family History   Problem Relation Age of Onset    No Known Problems Mother     No Known Problems Father      Social History     Socioeconomic History    Marital status:      Spouse name: Not on file    Number of children: Not on file    Years of education: Not on file    Highest education level: Not on file   Occupational History    Not on file   Tobacco Use    Smoking status: Never Smoker    Smokeless tobacco: Never Used   Vaping Use    Vaping Use: Never used   Substance and Sexual Activity    Alcohol use: Not Currently    Drug use:  No  Sexual activity: Not Currently   Other Topics Concern    Not on file   Social History Narrative    Not on file     Social Determinants of Health     Financial Resource Strain: Unknown    Difficulty of Paying Living Expenses: Patient refused   Food Insecurity: Unknown    Worried About Running Out of Food in the Last Year: Patient refused    [address] in the Last Year: Patient refused   Transportation Needs:     Lack of Transportation (Medical): Not on file    Lack of Transportation (Non-Medical): Not on file   Physical Activity:     Days of Exercise per Week: Not on file    Minutes of Exercise per Session: Not on file   Stress:     Feeling of Stress : Not on file   Social Connections:     Frequency of Communication with Friends and Family: Not on file    Frequency of Social Gatherings with Friends and Family: Not on file    Attends [employer] Services: Not on file    Active Member of [address] [place] [address] [employer] or Organizations: Not on file    Attends Club or Organization Meetings: Not on file    Marital Status: Not on file   Intimate Partner Violence:     Fear of Current or Ex-Partner: Not on file    Emotionally Abused: Not on file    Physically Abused: Not on file    Sexually Abused: Not on file   Housing Stability:     Unable to Pay for Housing in the Last Year: Not on file    Number of [place] in the Last Year: Not on file    Unstable Housing in the Last Year: Not on file       Vitals:    03/03/22 1121   BP: 100/78   Pulse: 75   Resp: 16   Temp: 97.1 °F (36.2 °C)   TempSrc: Temporal   SpO2: 98%   Weight: 141 lb (64 kg)   Height: 5' 4\" (1.626 m)       Physical Exam  Vitals and nursing note reviewed. Constitutional:       Appearance: She is well-developed. HENT:      Head: Atraumatic. Right Ear: External ear normal.      Left Ear: External ear normal.      Nose: Nose normal.      Mouth/Throat:      Pharynx: No oropharyngeal exudate.    Eyes:      Conjunctiva/sclera: Conjunctivae normal. Pupils: Pupils are equal, round, and reactive to light. Neck:      Thyroid: No thyromegaly. Trachea: No tracheal deviation. Cardiovascular:      Rate and Rhythm: Normal rate and regular rhythm. Heart sounds: No murmur heard. No friction rub. No gallop. Pulmonary:      Effort: Pulmonary effort is normal. No respiratory distress. Breath sounds: Normal breath sounds. Abdominal:      General: Bowel sounds are normal.      Palpations: Abdomen is soft. Musculoskeletal:         General: No tenderness or deformity. Normal range of motion. Cervical back: Normal range of motion and neck supple. Right lower leg: Edema present. Left lower leg: Edema present. Lymphadenopathy:      Cervical: No cervical adenopathy. Skin:     General: Skin is warm and dry. Capillary Refill: Capillary refill takes less than 2 seconds. Findings: No rash. Neurological:      Mental Status: She is alert and oriented to person, place, and time. Sensory: No sensory deficit. Motor: No abnormal muscle tone.       Coordination: Coordination normal.      Deep Tendon Reflexes: Reflexes normal.             Seen By:  Hamilton Abbasi,

## 2022-03-04 DIAGNOSIS — R60.0 LOCALIZED EDEMA: ICD-10-CM

## 2022-03-31 ENCOUNTER — OFFICE VISIT (OUTPATIENT)
Dept: FAMILY MEDICINE CLINIC | Age: 82
End: 2022-03-31
Payer: MEDICARE

## 2022-03-31 VITALS
SYSTOLIC BLOOD PRESSURE: 100 MMHG | WEIGHT: 141 LBS | OXYGEN SATURATION: 96 % | HEART RATE: 71 BPM | BODY MASS INDEX: 24.07 KG/M2 | TEMPERATURE: 97.3 F | HEIGHT: 64 IN | DIASTOLIC BLOOD PRESSURE: 66 MMHG

## 2022-03-31 DIAGNOSIS — R55 PRE-SYNCOPE: ICD-10-CM

## 2022-03-31 DIAGNOSIS — R42 DIZZY: ICD-10-CM

## 2022-03-31 DIAGNOSIS — R60.0 LOCALIZED EDEMA: ICD-10-CM

## 2022-03-31 DIAGNOSIS — R53.1 WEAK: Primary | ICD-10-CM

## 2022-03-31 PROCEDURE — 99214 OFFICE O/P EST MOD 30 MIN: CPT | Performed by: INTERNAL MEDICINE

## 2022-03-31 PROCEDURE — 93000 ELECTROCARDIOGRAM COMPLETE: CPT | Performed by: INTERNAL MEDICINE

## 2022-03-31 ASSESSMENT — ENCOUNTER SYMPTOMS
NAUSEA: 0
DIARRHEA: 0
ABDOMINAL PAIN: 0
SHORTNESS OF BREATH: 0
COUGH: 0
VOMITING: 0

## 2022-03-31 NOTE — PROGRESS NOTES
[facility]     3/31/22  Singh Meth : 1940 Sex: female  Age: [de-identified] y.o. Chief Complaint   Patient presents with    Hypotension     became clammy, light headed, needed to lay down       HPI    Patient presents today accompanied by friend who brought her in complaining of episode of dizziness clammy feeling, presyncope while playing a game of scrabble. Tried to check blood pressure but he kept saying error. Is brought in today in a wheelchair. Initial blood pressure when she walked and was 90/56. We did get her some water to start drinking. States her PCP has been trying to adjust her Lasix because of lower extremity edema. She denies any chest pain or shortness of breath with any of this. Denies heart history. She currently is on Lasix 20 mg a day for 5 days and 40 mg for 2 days. Today was the 40 mg dose. Review of Systems   Constitutional: Negative for chills and fever. HENT: Negative. Respiratory: Negative for cough and shortness of breath. Cardiovascular: Positive for leg swelling. Negative for chest pain and palpitations. Gastrointestinal: Negative for abdominal pain, diarrhea, nausea and vomiting. Genitourinary: Negative. Musculoskeletal: Positive for arthralgias. Neurological: Positive for light-headedness and numbness. Negative for syncope and headaches. REST OF PERTINENT ROS GONE OVER AND WAS NEGATIVE. Current Outpatient Medications:     mirabegron (MYRBETRIQ) 25 MG TB24, Take 25 mg by mouth daily, Disp: , Rfl:     furosemide (LASIX) 20 MG tablet, Take 1 tablet by mouth daily (Patient taking differently: Take 20 mg by mouth daily 20 mg 5 days a week, 40mg 2 days a week), Disp: 60 tablet, Rfl: 3    miconazole (MICOTIN) 2 % powder, Apply topically 2 times daily. , Disp: 45 g, Rfl: 0    white petrolatum OINT ointment, Apply topically 2 times daily, Disp: 500 g, Rfl: 0    Handicap Placard MISC, by Does not apply route , Disp: , Rfl:     carbidopa-levodopa (SINEMET)  MG per tablet, Take 1.5 tablets by mouth 3 times daily , Disp: , Rfl: 0    ropinirole (REQUIP) 1 MG tablet, Take 12 mg by mouth every morning (before breakfast) , Disp: , Rfl:     nortriptyline (PAMELOR) 10 MG capsule, Take 10 mg by mouth nightly , Disp: , Rfl:   No Known Allergies    Past Medical History:   Diagnosis Date    Degenerative joint disease involving multiple joints     Hyperlipidemia     Hypertension     Parkinson disease ([facility] [address]     Skin cancer     scc     Past Surgical History:   Procedure Laterality Date    KNEE SURGERY  left    TONSILLECTOMY AND ADENOIDECTOMY       Family History   Problem Relation Age of Onset    No Known Problems Mother     No Known Problems Father      Social History     Socioeconomic History    Marital status:      Spouse name: Not on file    Number of children: Not on file    Years of education: Not on file    Highest education level: Not on file   Occupational History    Not on file   Tobacco Use    Smoking status: Never Smoker    Smokeless tobacco: Never Used   Vaping Use    Vaping Use: Never used   Substance and Sexual Activity    Alcohol use: Not Currently    Drug use: No    Sexual activity: Not Currently   Other Topics Concern    Not on file   Social History Narrative    Not on file     Social Determinants of Health     Financial Resource Strain: Unknown    Difficulty of Paying Living Expenses: Patient refused   Food Insecurity: Unknown    Worried About Running Out of Food in the Last Year: Patient refused    Ran Out of Food in the Last Year: Patient refused   Transportation Needs:     Lack of Transportation (Medical): Not on file    Lack of Transportation (Non-Medical):  Not on file   Physical Activity:     Days of Exercise per Week: Not on file    Minutes of Exercise per Session: Not on file   Stress:     Feeling of Stress : Not on file   Social Connections:     Frequency of Communication with Friends and Family: Not on file    Frequency of Social Gatherings with Friends and Family: Not on file    Attends [employer] Services: Not on file    Active Member of Clubs or Organizations: Not on file    Attends Club or Organization Meetings: Not on file    Marital Status: Not on file   Intimate Partner Violence:     Fear of Current or Ex-Partner: Not on file    Emotionally Abused: Not on file    Physically Abused: Not on file    Sexually Abused: Not on file   Housing Stability:     Unable to Pay for Housing in the Last Year: Not on file    Number of [place] in the Last Year: Not on file    Unstable Housing in the Last Year: Not on file       Vitals:    03/31/22 1535 03/31/22 1550 03/31/22 1743   BP: (!) 90/56 90/64 100/66   Pulse: 71     Temp: 97.3 °F (36.3 °C)     TempSrc: Temporal     SpO2: 96%     Weight: 141 lb (64 kg)     Height: 5' 4\" (1.626 m)         Physical Exam  Vitals and nursing note reviewed. Constitutional:       General: She is not in acute distress. Neck:      Vascular: No carotid bruit. Cardiovascular:      Rate and Rhythm: Normal rate and regular rhythm. Heart sounds: No murmur heard. Pulmonary:      Effort: Pulmonary effort is normal. No respiratory distress. Breath sounds: Normal breath sounds. No wheezing, rhonchi or rales. Abdominal:      General: Bowel sounds are normal.      Palpations: Abdomen is soft. Tenderness: There is no abdominal tenderness. Musculoskeletal:      Cervical back: Normal range of motion and neck supple. No tenderness. Skin:     General: Skin is warm and dry. Neurological:      General: No focal deficit present. Mental Status: She is alert and oriented to person, place, and time. Sensory: No sensory deficit. Motor: No weakness. Psychiatric:         Mood and Affect: Mood normal.         Behavior: Behavior normal.         Thought Content:  Thought content normal.         Judgment: Judgment normal. Assessment and Plan:  Tamika Hernández was seen today for hypotension. Diagnoses and all orders for this visit:    Weak  -     EKG 12 Lead; Future  -     EKG 12 Lead    Dizzy  -     EKG 12 Lead; Future  -     EKG 12 Lead    Pre-syncope  -     EKG 12 Lead; Future  -     EKG 12 Lead    Localized edema      Plan: After drinking a couple glasses of water rechecked her pressure and it did come up to 100/ 66. She was feeling better. I did do an EKG which I visualized showing no acute ischemic changes. I did tell her to cut the Lasix back from her current regimen to 20 mg every other day and get an Ace wrap and start using that for her swelling. She has an appointment with her PCP on Monday and will be getting blood work on that day as well. To the emergency room over the weekend if any recurrence or worsening. Encounter with face-to-face time, including discussion with friend in the room, answering all questions, chart review, post visit E HR documentation was 30 minutes. Return for fu pcp. Seen By:  Roderick Moon MD      *Document was created using voice recognition software. Note was reviewed however may contain grammatical errors.

## 2022-04-04 ENCOUNTER — OFFICE VISIT (OUTPATIENT)
Dept: FAMILY MEDICINE CLINIC | Age: 82
End: 2022-04-04
Payer: MEDICARE

## 2022-04-04 VITALS
WEIGHT: 143.8 LBS | OXYGEN SATURATION: 95 % | BODY MASS INDEX: 28.99 KG/M2 | SYSTOLIC BLOOD PRESSURE: 124 MMHG | DIASTOLIC BLOOD PRESSURE: 86 MMHG | HEIGHT: 59 IN | TEMPERATURE: 96.5 F | HEART RATE: 82 BPM

## 2022-04-04 DIAGNOSIS — R60.0 LOCALIZED EDEMA: ICD-10-CM

## 2022-04-04 DIAGNOSIS — I10 PRIMARY HYPERTENSION: Primary | ICD-10-CM

## 2022-04-04 PROCEDURE — 99213 OFFICE O/P EST LOW 20 MIN: CPT | Performed by: FAMILY MEDICINE

## 2022-04-04 ASSESSMENT — ENCOUNTER SYMPTOMS
TROUBLE SWALLOWING: 0
DIARRHEA: 0
BLOOD IN STOOL: 0
COUGH: 0
CHEST TIGHTNESS: 0
BACK PAIN: 0
ALLERGIC/IMMUNOLOGIC NEGATIVE: 1
EYE DISCHARGE: 0
VOMITING: 0
NAUSEA: 0
SINUS PAIN: 0
SORE THROAT: 0
EYE PAIN: 0
SHORTNESS OF BREATH: 0
PHOTOPHOBIA: 0
EYE REDNESS: 0
ABDOMINAL PAIN: 0

## 2022-04-04 NOTE — PROGRESS NOTES
22  Karla Bello : 1940 Sex: female  Age: [de-identified] y.o. Assessment and Plan:  Mark Zimmerman was seen today for other. Diagnoses and all orders for this visit:    Primary hypertension    Localized edema    Her pressure is back to baseline. Can resume the Lasix at  and Friday. We will recheck it and kidney function in 1 month. No follow-ups on file. Chief Complaint   Patient presents with    Other     follow up low BP  ,off waterpill all weekend       The patient is here for blood pressure check. Patient Lasix per express visit of last Thursday. Her blood pressure was low. No headache or visual disturbances. No dizziness or tinnitus. No chest pain, palpitations, or dyspnea. No nausea and vomiting. No numbness, tingling and or weakness to the extremities. No fevers or chills. No recent illness. Review of Systems   Constitutional: Negative for appetite change, fatigue and unexpected weight change. HENT: Negative for congestion, ear pain, hearing loss, sinus pain, sore throat and trouble swallowing. Eyes: Negative for photophobia, pain, discharge and redness. Respiratory: Negative for cough, chest tightness and shortness of breath. Cardiovascular: Positive for leg swelling. Negative for chest pain and palpitations. Gastrointestinal: Negative for abdominal pain, blood in stool, diarrhea, nausea and vomiting. Endocrine: Negative. Genitourinary: Negative for dysuria, flank pain, frequency and hematuria. Musculoskeletal: Negative for arthralgias, back pain, joint swelling and myalgias. Skin: Negative. Allergic/Immunologic: Negative. Neurological: Negative for dizziness, seizures, syncope, weakness, light-headedness, numbness and headaches. Hematological: Negative for adenopathy. Does not bruise/bleed easily. Psychiatric/Behavioral: Negative.           Current Outpatient Medications:     mirabegron (MYRBETRIQ) 25 MG TB24, Take 25 mg by mouth daily, Disp: , Rfl:     miconazole (MICOTIN) 2 % powder, Apply topically 2 times daily. , Disp: 45 g, Rfl: 0    white petrolatum OINT ointment, Apply topically 2 times daily, Disp: 500 g, Rfl: 0    Handicap Placard MISC, by Does not apply route , Disp: , Rfl:     carbidopa-levodopa (SINEMET)  MG per tablet, Take 1.5 tablets by mouth 3 times daily , Disp: , Rfl: 0    ropinirole (REQUIP) 1 MG tablet, Take 12 mg by mouth every morning (before breakfast) , Disp: , Rfl:     nortriptyline (PAMELOR) 10 MG capsule, Take 10 mg by mouth nightly , Disp: , Rfl:     furosemide (LASIX) 20 MG tablet, Take 1 tablet by mouth daily (Patient not taking: Reported on 4/4/2022), Disp: 60 tablet, Rfl: 3  No Known Allergies    Past Medical History:   Diagnosis Date    Degenerative joint disease involving multiple joints     Hyperlipidemia     Hypertension     Parkinson disease ([facility] [address]     Skin cancer     scc     Past Surgical History:   Procedure Laterality Date    KNEE SURGERY  left    TONSILLECTOMY AND ADENOIDECTOMY       Family History   Problem Relation Age of Onset    No Known Problems Mother     No Known Problems Father      Social History     Socioeconomic History    Marital status:      Spouse name: Not on file    Number of children: Not on file    Years of education: Not on file    Highest education level: Not on file   Occupational History    Not on file   Tobacco Use    Smoking status: Never Smoker    Smokeless tobacco: Never Used   Vaping Use    Vaping Use: Never used   Substance and Sexual Activity    Alcohol use: Not Currently    Drug use: No    Sexual activity: Not Currently   Other Topics Concern    Not on file   Social History Narrative    Not on file     Social Determinants of Health     Financial Resource Strain: Unknown    Difficulty of Paying Living Expenses: Patient refused   Food Insecurity: Unknown    Worried About Running Out of Food in the Last Year: Patient refused    [place] of Food in the Last Year: Patient refused   Transportation Needs:     Lack of Transportation (Medical): Not on file    Lack of Transportation (Non-Medical): Not on file   Physical Activity:     Days of Exercise per Week: Not on file    Minutes of Exercise per Session: Not on file   Stress:     Feeling of Stress : Not on file   Social Connections:     Frequency of Communication with Friends and Family: Not on file    Frequency of Social Gatherings with Friends and Family: Not on file    Attends Denominational Services: Not on file    Active Member of [address] [place] [address] [employer] or Organizations: Not on file    Attends Club or Organization Meetings: Not on file    Marital Status: Not on file   Intimate Partner Violence:     Fear of Current or Ex-Partner: Not on file    Emotionally Abused: Not on file    Physically Abused: Not on file    Sexually Abused: Not on file   Housing Stability:     Unable to Pay for Housing in the Last Year: Not on file    Number of [place] in the Last Year: Not on file    Unstable Housing in the Last Year: Not on file       Vitals:    04/04/22 1104   BP: 124/86   Pulse: 82   Temp: 96.5 °F (35.8 °C)   TempSrc: Temporal   SpO2: 95%   Weight: 143 lb 12.8 oz (65.2 kg)   Height: 4' 11\" (1.499 m)       Physical Exam  Vitals and nursing note reviewed. Constitutional:       Appearance: She is well-developed. HENT:      Head: Atraumatic. Right Ear: External ear normal.      Left Ear: External ear normal.      Nose: Nose normal.      Mouth/Throat:      Pharynx: No oropharyngeal exudate. Eyes:      Conjunctiva/sclera: Conjunctivae normal.      Pupils: Pupils are equal, round, and reactive to light. Neck:      Thyroid: No thyromegaly. Trachea: No tracheal deviation. Cardiovascular:      Rate and Rhythm: Normal rate and regular rhythm. Heart sounds: No murmur heard. No friction rub. No gallop. Pulmonary:      Effort: Pulmonary effort is normal. No respiratory distress.       Breath sounds: Normal breath sounds. Abdominal:      General: Bowel sounds are normal.      Palpations: Abdomen is soft. Musculoskeletal:         General: No tenderness or deformity. Normal range of motion. Cervical back: Normal range of motion and neck supple. Right lower leg: Edema present. Left lower leg: Edema present. Lymphadenopathy:      Cervical: No cervical adenopathy. Skin:     General: Skin is warm and dry. Capillary Refill: Capillary refill takes less than 2 seconds. Findings: No rash. Neurological:      Mental Status: She is alert and oriented to person, place, and time. Sensory: No sensory deficit. Motor: No abnormal muscle tone.       Coordination: Coordination normal.      Deep Tendon Reflexes: Reflexes normal.             Seen By:  Adarsh Morales DO

## 2022-04-20 ENCOUNTER — TELEPHONE (OUTPATIENT)
Dept: FAMILY MEDICINE CLINIC | Age: 82
End: 2022-04-20

## 2022-04-20 NOTE — TELEPHONE ENCOUNTER
Patient called and stated she broke her tooth and she is afraid she may get an infection. Should she take an antibiotic?

## 2022-04-26 NOTE — TELEPHONE ENCOUNTER
Patient stated she would like to wait until 5/5 to come in to be seen since she already has an appointment made.

## 2023-01-16 ENCOUNTER — OFFICE VISIT (OUTPATIENT)
Dept: FAMILY MEDICINE CLINIC | Age: 83
End: 2023-01-16
Payer: MEDICARE

## 2023-01-16 VITALS
OXYGEN SATURATION: 97 % | DIASTOLIC BLOOD PRESSURE: 70 MMHG | SYSTOLIC BLOOD PRESSURE: 116 MMHG | HEIGHT: 59 IN | RESPIRATION RATE: 16 BRPM | HEART RATE: 83 BPM | TEMPERATURE: 97.4 F | BODY MASS INDEX: 29.04 KG/M2

## 2023-01-16 DIAGNOSIS — I10 PRIMARY HYPERTENSION: Primary | ICD-10-CM

## 2023-01-16 DIAGNOSIS — R63.4 WEIGHT LOSS: ICD-10-CM

## 2023-01-16 DIAGNOSIS — N18.32 STAGE 3B CHRONIC KIDNEY DISEASE (HCC): ICD-10-CM

## 2023-01-16 DIAGNOSIS — E78.2 MIXED HYPERLIPIDEMIA: ICD-10-CM

## 2023-01-16 DIAGNOSIS — G20 PARKINSON DISEASE (HCC): ICD-10-CM

## 2023-01-16 DIAGNOSIS — Z91.81 AT HIGH RISK FOR FALLS: ICD-10-CM

## 2023-01-16 DIAGNOSIS — I10 PRIMARY HYPERTENSION: ICD-10-CM

## 2023-01-16 DIAGNOSIS — R60.0 LOCALIZED EDEMA: ICD-10-CM

## 2023-01-16 LAB
ALBUMIN SERPL-MCNC: 3.8 G/DL (ref 3.5–5.2)
ALP BLD-CCNC: 71 U/L (ref 35–104)
ALT SERPL-CCNC: <5 U/L (ref 0–32)
ANION GAP SERPL CALCULATED.3IONS-SCNC: 16 MMOL/L (ref 7–16)
AST SERPL-CCNC: 10 U/L (ref 0–31)
BASOPHILS ABSOLUTE: 0.05 E9/L (ref 0–0.2)
BASOPHILS RELATIVE PERCENT: 0.7 % (ref 0–2)
BILIRUB SERPL-MCNC: 0.6 MG/DL (ref 0–1.2)
BILIRUBIN URINE: NEGATIVE
BLOOD, URINE: NEGATIVE
BUN BLDV-MCNC: 24 MG/DL (ref 6–23)
CALCIUM SERPL-MCNC: 8.7 MG/DL (ref 8.6–10.2)
CHLORIDE BLD-SCNC: 105 MMOL/L (ref 98–107)
CHOLESTEROL, TOTAL: 214 MG/DL (ref 0–199)
CLARITY: CLEAR
CO2: 20 MMOL/L (ref 22–29)
COLOR: YELLOW
CREAT SERPL-MCNC: 0.7 MG/DL (ref 0.5–1)
EOSINOPHILS ABSOLUTE: 0.17 E9/L (ref 0.05–0.5)
EOSINOPHILS RELATIVE PERCENT: 2.4 % (ref 0–6)
GFR SERPL CREATININE-BSD FRML MDRD: >60 ML/MIN/1.73
GLUCOSE FASTING: 96 MG/DL (ref 74–99)
GLUCOSE URINE: NEGATIVE MG/DL
HCT VFR BLD CALC: 39.2 % (ref 34–48)
HDLC SERPL-MCNC: 58 MG/DL
HEMOGLOBIN: 12.7 G/DL (ref 11.5–15.5)
IMMATURE GRANULOCYTES #: 0.03 E9/L
IMMATURE GRANULOCYTES %: 0.4 % (ref 0–5)
KETONES, URINE: ABNORMAL MG/DL
LDL CHOLESTEROL CALCULATED: 134 MG/DL (ref 0–99)
LEUKOCYTE ESTERASE, URINE: NEGATIVE
LYMPHOCYTES ABSOLUTE: 1.01 E9/L (ref 1.5–4)
LYMPHOCYTES RELATIVE PERCENT: 14.4 % (ref 20–42)
MCH RBC QN AUTO: 32.1 PG (ref 26–35)
MCHC RBC AUTO-ENTMCNC: 32.4 % (ref 32–34.5)
MCV RBC AUTO: 99 FL (ref 80–99.9)
MONOCYTES ABSOLUTE: 0.7 E9/L (ref 0.1–0.95)
MONOCYTES RELATIVE PERCENT: 10 % (ref 2–12)
NEUTROPHILS ABSOLUTE: 5.05 E9/L (ref 1.8–7.3)
NEUTROPHILS RELATIVE PERCENT: 72.1 % (ref 43–80)
NITRITE, URINE: NEGATIVE
PDW BLD-RTO: 14.9 FL (ref 11.5–15)
PH UA: 5.5 (ref 5–9)
PLATELET # BLD: 293 E9/L (ref 130–450)
PMV BLD AUTO: 10.3 FL (ref 7–12)
POTASSIUM SERPL-SCNC: 3.9 MMOL/L (ref 3.5–5)
PROTEIN UA: NEGATIVE MG/DL
RBC # BLD: 3.96 E12/L (ref 3.5–5.5)
SODIUM BLD-SCNC: 141 MMOL/L (ref 132–146)
SPECIFIC GRAVITY UA: 1.02 (ref 1–1.03)
TOTAL PROTEIN: 6.1 G/DL (ref 6.4–8.3)
TRIGL SERPL-MCNC: 108 MG/DL (ref 0–149)
TSH SERPL DL<=0.05 MIU/L-ACNC: 1.88 UIU/ML (ref 0.27–4.2)
UROBILINOGEN, URINE: 0.2 E.U./DL
VLDLC SERPL CALC-MCNC: 22 MG/DL
WBC # BLD: 7 E9/L (ref 4.5–11.5)

## 2023-01-16 PROCEDURE — 1124F ACP DISCUSS-NO DSCNMKR DOCD: CPT | Performed by: FAMILY MEDICINE

## 2023-01-16 PROCEDURE — 3074F SYST BP LT 130 MM HG: CPT | Performed by: FAMILY MEDICINE

## 2023-01-16 PROCEDURE — 99214 OFFICE O/P EST MOD 30 MIN: CPT | Performed by: FAMILY MEDICINE

## 2023-01-16 PROCEDURE — 81003 URINALYSIS AUTO W/O SCOPE: CPT | Performed by: FAMILY MEDICINE

## 2023-01-16 PROCEDURE — 3288F FALL RISK ASSESSMENT DOCD: CPT | Performed by: FAMILY MEDICINE

## 2023-01-16 PROCEDURE — 3078F DIAST BP <80 MM HG: CPT | Performed by: FAMILY MEDICINE

## 2023-01-16 ASSESSMENT — ENCOUNTER SYMPTOMS
DIARRHEA: 0
EYE PAIN: 0
SORE THROAT: 0
VOMITING: 0
CHEST TIGHTNESS: 0
SINUS PAIN: 0
ALLERGIC/IMMUNOLOGIC NEGATIVE: 1
TROUBLE SWALLOWING: 0
PHOTOPHOBIA: 0
NAUSEA: 0
EYE REDNESS: 0
ABDOMINAL PAIN: 0
EYE DISCHARGE: 0
BACK PAIN: 0
SHORTNESS OF BREATH: 0
COUGH: 0
BLOOD IN STOOL: 0

## 2023-01-16 ASSESSMENT — PATIENT HEALTH QUESTIONNAIRE - PHQ9
2. FEELING DOWN, DEPRESSED OR HOPELESS: 0
SUM OF ALL RESPONSES TO PHQ QUESTIONS 1-9: 0
1. LITTLE INTEREST OR PLEASURE IN DOING THINGS: 0
SUM OF ALL RESPONSES TO PHQ QUESTIONS 1-9: 0
SUM OF ALL RESPONSES TO PHQ QUESTIONS 1-9: 0
SUM OF ALL RESPONSES TO PHQ9 QUESTIONS 1 & 2: 0
SUM OF ALL RESPONSES TO PHQ QUESTIONS 1-9: 0

## 2023-01-16 NOTE — PROGRESS NOTES
23  Letitia Manley : 1940 Sex: female  Age: 81 y.o. Assessment and Plan:  Sharmila Cheney was seen today for hypertension and leg swelling. Diagnoses and all orders for this visit:    Primary hypertension  -     CBC with Auto Differential; Future  -     Comprehensive Metabolic Panel, Fasting; Future  -     Lipid Panel; Future  -     TSH; Future  -     Urinalysis; Future    At high risk for falls    Parkinson disease ([facility] [address]  -     CBC with Auto Differential; Future  -     Comprehensive Metabolic Panel, Fasting; Future    Stage 3b chronic kidney disease ([facility] [address]  -     CBC with Auto Differential; Future  -     Comprehensive Metabolic Panel, Fasting; Future    Mixed hyperlipidemia  -     CBC with Auto Differential; Future  -     Comprehensive Metabolic Panel, Fasting; Future  -     Lipid Panel; Future    Localized edema  -     CBC with Auto Differential; Future  -     Comprehensive Metabolic Panel, Fasting; Future    Weight loss  -     CBC with Auto Differential; Future  -     Comprehensive Metabolic Panel, Fasting; Future  -     Lipid Panel; Future  -     TSH; Future  -     Urinalysis; Future      Return in about 4 weeks (around 2023). Chief Complaint   Patient presents with    Hypertension    Leg Swelling       The patient is here for blood pressure check. Patient has been taking their medications as prescribed. No recent changes to their medications. No headache or visual disturbances. No dizziness or tinnitus. No chest pain, palpitations, or dyspnea. No nausea and vomiting. No numbness, tingling and or weakness to the extremities. No fevers or chills. No recent illness. Has some weight loss, 18 pounds over the last 12 months. She does have Parkinson's and is having [facility]. She states she is just not hungry. Her weight dipped down to a low of 115, now up to 122 pounds. Review of Systems   Constitutional:  Positive for unexpected weight change.  Negative for appetite change and fatigue. HENT:  Negative for congestion, ear pain, hearing loss, sinus pain, sore throat and trouble swallowing. Eyes:  Negative for photophobia, pain, discharge and redness. Respiratory:  Negative for cough, chest tightness and shortness of breath. Cardiovascular:  Positive for leg swelling. Negative for chest pain and palpitations. Gastrointestinal:  Negative for abdominal pain, blood in stool, diarrhea, nausea and vomiting. Endocrine: Negative. Genitourinary:  Negative for dysuria, flank pain, frequency and hematuria. Difficulty urinating: . TEMPWETREAD. Musculoskeletal:  Negative for arthralgias, back pain, joint swelling and myalgias. Skin: Negative. Allergic/Immunologic: Negative. Neurological:  Negative for dizziness, seizures, syncope, weakness, light-headedness, numbness and headaches. Hematological:  Negative for adenopathy. Does not bruise/bleed easily. Psychiatric/Behavioral: Negative. All other systems reviewed and are negative. Current Outpatient Medications:     mirabegron (MYRBETRIQ) 25 MG TB24, Take 25 mg by mouth daily, Disp: , Rfl:     furosemide (LASIX) 20 MG tablet, Take 1 tablet by mouth daily, Disp: 60 tablet, Rfl: 3    Handicap Placard MISC, by Does not apply route , Disp: , Rfl:     carbidopa-levodopa (SINEMET)  MG per tablet, Take 1.5 tablets by mouth 3 times daily , Disp: , Rfl: 0    ropinirole (REQUIP) 1 MG tablet, Take 12 mg by mouth every morning (before breakfast) , Disp: , Rfl:     nortriptyline (PAMELOR) 10 MG capsule, Take 10 mg by mouth nightly , Disp: , Rfl:     miconazole (MICOTIN) 2 % powder, Apply topically 2 times daily.  (Patient not taking: Reported on 1/16/2023), Disp: 45 g, Rfl: 0  No Known Allergies    Past Medical History:   Diagnosis Date    Degenerative joint disease involving multiple joints     Hyperlipidemia     Hypertension     Parkinson disease ([facility] [address]     Skin cancer     scc     Past Surgical History: Procedure Laterality Date    KNEE SURGERY  left    TONSILLECTOMY AND ADENOIDECTOMY       Family History   Problem Relation Age of Onset    No Known Problems Mother     No Known Problems Father      Social History     Socioeconomic History    Marital status:      Spouse name: Not on file    Number of children: Not on file    Years of education: Not on file    Highest education level: Not on file   Occupational History    Not on file   Tobacco Use    Smoking status: Never    Smokeless tobacco: Never   Vaping Use    Vaping Use: Never used   Substance and Sexual Activity    Alcohol use: Not Currently    Drug use: No    Sexual activity: Not Currently   Other Topics Concern    Not on file   Social History Narrative    Not on file     Social Determinants of Health     Financial Resource Strain: Not on file   Food Insecurity: Not on file   Transportation Needs: Not on file   Physical Activity: Not on file   Stress: Not on file   Social Connections: Not on file   Intimate Partner Violence: Not on file   Housing Stability: Not on file       Vitals:    01/16/23 1402   BP: 116/70   Pulse: 83   Resp: 16   Temp: 97.4 °F (36.3 °C)   TempSrc: Temporal   SpO2: 97%   Height: 4' 11\" (1.499 m)       Physical Exam  Vitals and nursing note reviewed. Constitutional:       Appearance: She is well-developed. HENT:      Head: Atraumatic. Right Ear: External ear normal.      Left Ear: External ear normal.      Nose: Nose normal.      Mouth/Throat:      Pharynx: No oropharyngeal exudate. Eyes:      Conjunctiva/sclera: Conjunctivae normal.      Pupils: Pupils are equal, round, and reactive to light. Neck:      Thyroid: No thyromegaly. Trachea: No tracheal deviation. Cardiovascular:      Rate and Rhythm: Normal rate and regular rhythm. Heart sounds: No murmur heard. No friction rub. No gallop. Pulmonary:      Effort: Pulmonary effort is normal. No respiratory distress.       Breath sounds: Normal breath sounds. Abdominal:      General: Bowel sounds are normal.      Palpations: Abdomen is soft. Musculoskeletal:         General: No swelling, tenderness or deformity. Normal range of motion. Cervical back: Normal range of motion and neck supple. Right lower leg: Edema present. Comments: Contusion of the right ankle. Lymphadenopathy:      Cervical: No cervical adenopathy. Skin:     General: Skin is warm and dry. Capillary Refill: Capillary refill takes less than 2 seconds. Findings: No rash. Neurological:      Mental Status: She is alert and oriented to person, place, and time. Sensory: No sensory deficit. Motor: No abnormal muscle tone. Coordination: Coordination normal.      Deep Tendon Reflexes: Reflexes normal.           Seen By:  Maile Severs, DO    On the basis of positive falls risk screening, assessment and plan is as follows: home safety tips provided.

## 2023-01-17 ENCOUNTER — TELEPHONE (OUTPATIENT)
Dept: FAMILY MEDICINE CLINIC | Age: 83
End: 2023-01-17

## 2023-01-17 DIAGNOSIS — E78.2 MIXED HYPERLIPIDEMIA: Primary | ICD-10-CM

## 2023-01-17 NOTE — TELEPHONE ENCOUNTER
Patient informed and agreeable to statin. Can you send to [facility] in [facility]?

## 2023-01-17 NOTE — TELEPHONE ENCOUNTER
----- Message from Sommer Grace DO sent at 1/17/2023  7:50 AM EST -----  Lipids elevated, would benefit from statin if she will take

## 2023-01-17 NOTE — TELEPHONE ENCOUNTER
Deion Sunshine called back and said that she wants to hold off getting a new medication to take right now. Her [facility] doctor is changing her medication at this time. Rather have 2 med changes at once, she will get back to you on the statin.

## 2023-01-18 RX ORDER — ROSUVASTATIN CALCIUM 20 MG/1
20 TABLET, COATED ORAL NIGHTLY
Qty: 30 TABLET | Refills: 3 | Status: SHIPPED | OUTPATIENT
Start: 2023-01-18

## 2023-01-18 NOTE — TELEPHONE ENCOUNTER
Patient called back and spoke to Huyen Aguero yesterday, she changed her mind about starting a new medication. Per Huyen Aguero:    Her Park[facility]'s doctor is changing her medication at this time. Rather have 2 med changes at once, she will get back to you on the statin. Okay to cancel statin and schedule a follow up appointment in 3 months?

## 2023-04-17 ENCOUNTER — OFFICE VISIT (OUTPATIENT)
Dept: FAMILY MEDICINE CLINIC | Age: 83
End: 2023-04-17
Payer: MEDICARE

## 2023-04-17 ENCOUNTER — OFFICE VISIT (OUTPATIENT)
Dept: FAMILY MEDICINE CLINIC | Age: 83
End: 2023-04-17

## 2023-04-17 VITALS
RESPIRATION RATE: 16 BRPM | HEART RATE: 75 BPM | OXYGEN SATURATION: 98 % | BODY MASS INDEX: 23.39 KG/M2 | DIASTOLIC BLOOD PRESSURE: 98 MMHG | HEIGHT: 59 IN | TEMPERATURE: 97.2 F | SYSTOLIC BLOOD PRESSURE: 146 MMHG | WEIGHT: 116 LBS

## 2023-04-17 VITALS
SYSTOLIC BLOOD PRESSURE: 130 MMHG | TEMPERATURE: 97.2 F | BODY MASS INDEX: 23.39 KG/M2 | OXYGEN SATURATION: 98 % | DIASTOLIC BLOOD PRESSURE: 80 MMHG | HEART RATE: 75 BPM | HEIGHT: 59 IN | RESPIRATION RATE: 16 BRPM | WEIGHT: 116 LBS

## 2023-04-17 DIAGNOSIS — M15.9 PRIMARY OSTEOARTHRITIS INVOLVING MULTIPLE JOINTS: ICD-10-CM

## 2023-04-17 DIAGNOSIS — I10 PRIMARY HYPERTENSION: Primary | ICD-10-CM

## 2023-04-17 DIAGNOSIS — G20 PARKINSON DISEASE (HCC): ICD-10-CM

## 2023-04-17 DIAGNOSIS — E78.2 MIXED HYPERLIPIDEMIA: ICD-10-CM

## 2023-04-17 DIAGNOSIS — Z00.00 MEDICARE ANNUAL WELLNESS VISIT, SUBSEQUENT: Primary | ICD-10-CM

## 2023-04-17 PROCEDURE — 3075F SYST BP GE 130 - 139MM HG: CPT | Performed by: FAMILY MEDICINE

## 2023-04-17 PROCEDURE — G0439 PPPS, SUBSEQ VISIT: HCPCS | Performed by: FAMILY MEDICINE

## 2023-04-17 PROCEDURE — 3079F DIAST BP 80-89 MM HG: CPT | Performed by: FAMILY MEDICINE

## 2023-04-17 PROCEDURE — 1124F ACP DISCUSS-NO DSCNMKR DOCD: CPT | Performed by: FAMILY MEDICINE

## 2023-04-17 SDOH — ECONOMIC STABILITY: FOOD INSECURITY: WITHIN THE PAST 12 MONTHS, YOU WORRIED THAT YOUR FOOD WOULD RUN OUT BEFORE YOU GOT MONEY TO BUY MORE.: NEVER TRUE

## 2023-04-17 SDOH — ECONOMIC STABILITY: INCOME INSECURITY: HOW HARD IS IT FOR YOU TO PAY FOR THE VERY BASICS LIKE FOOD, HOUSING, MEDICAL CARE, AND HEATING?: NOT HARD AT ALL

## 2023-04-17 SDOH — ECONOMIC STABILITY: FOOD INSECURITY: WITHIN THE PAST 12 MONTHS, THE FOOD YOU BOUGHT JUST DIDN'T LAST AND YOU DIDN'T HAVE MONEY TO GET MORE.: NEVER TRUE

## 2023-04-17 SDOH — ECONOMIC STABILITY: HOUSING INSECURITY
IN THE LAST 12 MONTHS, WAS THERE A TIME WHEN YOU DID NOT HAVE A STEADY PLACE TO SLEEP OR SLEPT IN A SHELTER (INCLUDING NOW)?: NO

## 2023-04-17 ASSESSMENT — ENCOUNTER SYMPTOMS
DIARRHEA: 0
ABDOMINAL PAIN: 0
COUGH: 0
CHEST TIGHTNESS: 0
SINUS PAIN: 0
EYE REDNESS: 0
VOMITING: 0
PHOTOPHOBIA: 0
NAUSEA: 0
SHORTNESS OF BREATH: 0
ALLERGIC/IMMUNOLOGIC NEGATIVE: 1
EYE DISCHARGE: 0
TROUBLE SWALLOWING: 0
EYE PAIN: 0
BLOOD IN STOOL: 0
SORE THROAT: 0
BACK PAIN: 0

## 2023-04-17 ASSESSMENT — LIFESTYLE VARIABLES
HOW OFTEN DO YOU HAVE A DRINK CONTAINING ALCOHOL: NEVER
HOW MANY STANDARD DRINKS CONTAINING ALCOHOL DO YOU HAVE ON A TYPICAL DAY: PATIENT DOES NOT DRINK

## 2023-04-17 ASSESSMENT — PATIENT HEALTH QUESTIONNAIRE - PHQ9
SUM OF ALL RESPONSES TO PHQ9 QUESTIONS 1 & 2: 0
SUM OF ALL RESPONSES TO PHQ QUESTIONS 1-9: 0
1. LITTLE INTEREST OR PLEASURE IN DOING THINGS: 0
SUM OF ALL RESPONSES TO PHQ QUESTIONS 1-9: 0
2. FEELING DOWN, DEPRESSED OR HOPELESS: 0

## 2023-04-17 NOTE — PROGRESS NOTES
23  Clem Muir : 1940 Sex: female  Age: 81 y.o. Assessment and Plan:  Avel Tyson was seen today for hypertension. Diagnoses and all orders for this visit:    Primary hypertension  Her blood pressure remains under good control with current medication. Parkinson disease [facility])  Neurology managing this complaint. Primary osteoarthritis involving multiple join  Weight loss is unable to move better, less pain. Mixed hyperlipidemia  -     Lipid Panel; Future        No follow-ups on file. Chief Complaint   Patient presents with    Hypertension       The patient is here for blood pressure check. Patient has been taking their medications as prescribed. No recent changes to their medications. No headache or visual disturbances. No dizziness or tinnitus. No chest pain, palpitations, or dyspnea. No nausea and vomiting. No numbness, tingling and or weakness to the extremities. No fevers or chills. No recent illness. Since this time last year, she has lost 30 pounds. Her lab work in January was essentially baseline. She is taking 1 Ensure daily for protein supplementation. Despite this weight loss, her BMI is within normal range at 23.4. Review of Systems   Constitutional:  Negative for appetite change, fatigue and unexpected weight change. HENT:  Negative for congestion, ear pain, hearing loss, sinus pain, sore throat and trouble swallowing. Eyes:  Negative for photophobia, pain, discharge and redness. Respiratory:  Negative for cough, chest tightness and shortness of breath. Cardiovascular:  Negative for chest pain, palpitations and leg swelling. Gastrointestinal:  Negative for abdominal pain, blood in stool, diarrhea, nausea and vomiting. Endocrine: Negative. Genitourinary:  Negative for dysuria, flank pain, frequency and hematuria. Musculoskeletal:  Negative for arthralgias, back pain, joint swelling and myalgias. Skin: Negative.     Allergic/Immunologic:

## 2023-04-17 NOTE — PROGRESS NOTES
Medicare Annual Wellness Visit    Virgilio Rodriguez is here for Medicare AWV    Assessment & Plan   Medicare annual wellness visit, subsequent      Recommendations for Preventive Services Due: see orders and patient instructions/AVS.  Recommended screening schedule for the next 5-10 years is provided to the patient in written form: see Patient Instructions/AVS.     Return for Medicare Annual Wellness Visit in 1 year. Subjective       Patient's complete Health Risk Assessment and screening values have been reviewed and are found in Flowsheets. The following problems were reviewed today and where indicated follow up appointments were made and/or referrals ordered. Positive Risk Factor Screenings with Interventions:    Fall Risk:  Do you feel unsteady or are you worried about falling? : (!) yes  2 or more falls in past year?: (!) yes  Fall with injury in past year?: no     Interventions:    Home tips given                Hearing Screen:  Do you or your family notice any trouble with your hearing that hasn't been managed with hearing aids?: (!) Yes    Interventions:  Patient declines any further evaluation or treatment     Safety:  Do you have working smoke detectors?: (!) No  Interventions:  Patient declined any further interventions or treatment    ADL's:   Patient reports needing help with:  Select all that apply: (!) Transportation  Interventions:  Patient declined any further interventions or treatment                    Objective   Vitals:    04/17/23 0912   BP: (!) 146/98   Pulse: 75   Resp: 16   Temp: 97.2 °F (36.2 °C)   TempSrc: Temporal   SpO2: 98%   Weight: 116 lb (52.6 kg)   Height: 4' 11\" (1.499 m)      Body mass index is 23.43 kg/m². No Known Allergies  Prior to Visit Medications    Medication Sig Taking?  Authorizing Provider   rosuvastatin (CRESTOR) 20 MG tablet Take 1 tablet by mouth nightly Yes [place] TARA Rodriguez,    mirabegron (MYRBETRIQ) 25 MG TB24 Take 1 tablet by mouth daily Yes

## 2023-04-17 NOTE — PATIENT INSTRUCTIONS
record and screening and assessments performed today your provider may have ordered immunizations, labs, imaging, and/or referrals for you. A list of these orders (if applicable) as well as your Preventive Care list are included within your After Visit Summary for your review. Other Preventive Recommendations:    A preventive eye exam performed by an eye specialist is recommended every 1-2 years to screen for glaucoma; cataracts, macular degeneration, and other eye disorders. A preventive dental visit is recommended every 6 months. Try to get at least 150 minutes of exercise per week or 10,000 steps per day on a pedometer . Order or download the FREE \"Exercise & Physical Activity: Your Everyday Guide\" from [employer]. Call 0-386.542.6019 or search [employer] online. You need 1739-1156 mg of calcium and 0013-0829 IU of vitamin D per day. It is possible to meet your calcium requirement with diet alone, but a vitamin D supplement is usually necessary to meet this goal.  When exposed to the sun, use a sunscreen that protects against both UVA and UVB radiation with an SPF of 30 or greater. Reapply every 2 to 3 hours or after sweating, drying off with a towel, or swimming. Always wear a seat belt when traveling in a car. Always wear a helmet when riding a bicycle or motorcycle.

## 2023-04-19 DIAGNOSIS — E78.2 MIXED HYPERLIPIDEMIA: ICD-10-CM

## 2023-04-19 RX ORDER — ROSUVASTATIN CALCIUM 40 MG/1
40 TABLET, COATED ORAL EVERY EVENING
Qty: 30 TABLET | Refills: 3 | Status: CANCELLED | OUTPATIENT
Start: 2023-04-19

## 2023-04-19 NOTE — TELEPHONE ENCOUNTER
Confirmed with pharamcy that patient never picked up crestor 20mg. Pended order for crestor 40mg for your review. Informed patient new Rx called in. Patient requested [address]

## 2023-04-21 RX ORDER — ROSUVASTATIN CALCIUM 20 MG/1
20 TABLET, COATED ORAL NIGHTLY
Qty: 30 TABLET | Refills: 3 | Status: SHIPPED | OUTPATIENT
Start: 2023-04-21

## 2023-05-25 ENCOUNTER — OFFICE VISIT (OUTPATIENT)
Dept: FAMILY MEDICINE CLINIC | Age: 83
End: 2023-05-25

## 2023-05-25 VITALS
HEIGHT: 59 IN | OXYGEN SATURATION: 99 % | HEART RATE: 72 BPM | WEIGHT: 118.4 LBS | BODY MASS INDEX: 23.87 KG/M2 | RESPIRATION RATE: 18 BRPM | TEMPERATURE: 97.8 F | DIASTOLIC BLOOD PRESSURE: 82 MMHG | SYSTOLIC BLOOD PRESSURE: 132 MMHG

## 2023-05-25 DIAGNOSIS — L08.9 INFECTED WOUND: ICD-10-CM

## 2023-05-25 DIAGNOSIS — T14.8XXA INFECTED WOUND: ICD-10-CM

## 2023-05-25 DIAGNOSIS — Z23 NEED FOR TDAP VACCINATION: ICD-10-CM

## 2023-05-25 DIAGNOSIS — L03.113 CELLULITIS OF RIGHT HAND: Primary | ICD-10-CM

## 2023-05-25 RX ORDER — CEPHALEXIN 500 MG/1
500 CAPSULE ORAL 4 TIMES DAILY
Qty: 40 CAPSULE | Refills: 0 | Status: SHIPPED | OUTPATIENT
Start: 2023-05-25

## 2023-05-25 RX ORDER — CEFTRIAXONE 1 G/1
1000 INJECTION, POWDER, FOR SOLUTION INTRAMUSCULAR; INTRAVENOUS ONCE
Status: COMPLETED | OUTPATIENT
Start: 2023-05-25 | End: 2023-05-25

## 2023-05-25 RX ORDER — DOXYCYCLINE HYCLATE 100 MG
100 TABLET ORAL 2 TIMES DAILY
Qty: 20 TABLET | Refills: 0 | Status: SHIPPED | OUTPATIENT
Start: 2023-05-25 | End: 2023-06-04

## 2023-05-25 RX ADMIN — CEFTRIAXONE 1000 MG: 1 INJECTION, POWDER, FOR SOLUTION INTRAMUSCULAR; INTRAVENOUS at 16:13

## 2023-05-25 NOTE — PROGRESS NOTES
Chief Complaint   Finger Pain (Happened about 5/6 days ago /States that she had clawed herself with her finger nail on her right index finger /Right hand is swollen- has been keeping covered )      History of Present Illness   Source of history provided by:  patient and daughters. Karan Bean is a 81 y.o. old female presenting to the walk in clinic for evaluation of redness to the dorsum of the right hand, which began 7 days ago after sustaining 2 scratches from her opposite hand. Reports the area is now warm to touch, moderately painful, and swollen. Denies lymphangitic streaking. Denies any bleeding or active drainage. Since onset the symptoms have progressed. Denies any fever, chills, HA, recent illness, myalgias, nausea, vomiting, or lethargy. Tdap is not up to date. ROS    Unless otherwise stated in this report or unable to obtain because of the patient's clinical or mental status as evidenced by the medical record, this patients's positive and negative responses for Review of Systems, constitutional, psych, eyes, ENT, cardiovascular, respiratory, gastrointestinal, neurological, genitourinary, musculoskeletal, integument systems and systems related to the presenting problem are either stated in the preceding or were not pertinent or were negative for the symptoms and/or complaints related to the medical problem. Past Medical History:  has a past medical history of Degenerative joint disease involving multiple joints, Hyperlipidemia, Hypertension, Parkinson disease ([address] and Skin cancer. Past Surgical History:  has a past surgical history that includes knee surgery (left) and Tonsillectomy and adenoidectomy. Social History:  reports that she has never smoked. She has never used smokeless tobacco. She reports that she does not currently use alcohol. She reports that she does not use drugs. Family History: family history includes No Known Problems in her father and mother.    Allergies:

## 2023-05-30 ENCOUNTER — HOSPITAL ENCOUNTER (EMERGENCY)
Age: 83
Discharge: HOME | End: 2023-05-30
Attending: EMERGENCY MEDICINE
Payer: MEDICARE

## 2023-05-30 ENCOUNTER — APPOINTMENT (OUTPATIENT)
Dept: ULTRASOUND IMAGING | Age: 83
End: 2023-05-30
Payer: MEDICARE

## 2023-05-30 ENCOUNTER — OFFICE VISIT (OUTPATIENT)
Dept: FAMILY MEDICINE CLINIC | Age: 83
End: 2023-05-30
Payer: MEDICARE

## 2023-05-30 ENCOUNTER — APPOINTMENT (OUTPATIENT)
Dept: GENERAL RADIOLOGY | Age: 83
End: 2023-05-30
Payer: MEDICARE

## 2023-05-30 VITALS
HEIGHT: 59 IN | HEART RATE: 79 BPM | RESPIRATION RATE: 16 BRPM | SYSTOLIC BLOOD PRESSURE: 126 MMHG | DIASTOLIC BLOOD PRESSURE: 86 MMHG | TEMPERATURE: 97.6 F | OXYGEN SATURATION: 97 % | BODY MASS INDEX: 23.79 KG/M2 | WEIGHT: 118 LBS

## 2023-05-30 VITALS
TEMPERATURE: 98.5 F | SYSTOLIC BLOOD PRESSURE: 142 MMHG | HEART RATE: 75 BPM | OXYGEN SATURATION: 99 % | DIASTOLIC BLOOD PRESSURE: 76 MMHG | RESPIRATION RATE: 16 BRPM

## 2023-05-30 DIAGNOSIS — L03.113 CELLULITIS OF RIGHT HAND: Primary | ICD-10-CM

## 2023-05-30 DIAGNOSIS — L03.113 CELLULITIS OF RIGHT UPPER EXTREMITY: Primary | ICD-10-CM

## 2023-05-30 LAB
ANION GAP SERPL CALCULATED.3IONS-SCNC: 10 MMOL/L (ref 7–16)
BASOPHILS # BLD: 0.07 E9/L (ref 0–0.2)
BASOPHILS NFR BLD: 0.8 % (ref 0–2)
BUN SERPL-MCNC: 23 MG/DL (ref 6–23)
CALCIUM SERPL-MCNC: 9.1 MG/DL (ref 8.6–10.2)
CHLORIDE SERPL-SCNC: 106 MMOL/L (ref 98–107)
CO2 SERPL-SCNC: 22 MMOL/L (ref 22–29)
CREAT SERPL-MCNC: 0.8 MG/DL (ref 0.5–1)
EOSINOPHIL # BLD: 0.11 E9/L (ref 0.05–0.5)
EOSINOPHIL NFR BLD: 1.3 % (ref 0–6)
ERYTHROCYTE [DISTWIDTH] IN BLOOD BY AUTOMATED COUNT: 12.3 FL (ref 11.5–15)
GLUCOSE SERPL-MCNC: 102 MG/DL (ref 74–99)
HCT VFR BLD AUTO: 42.1 % (ref 34–48)
HGB BLD-MCNC: 13.8 G/DL (ref 11.5–15.5)
IMM GRANULOCYTES # BLD: 0.04 E9/L
IMM GRANULOCYTES NFR BLD: 0.5 % (ref 0–5)
LYMPHOCYTES # BLD: 1.23 E9/L (ref 1.5–4)
LYMPHOCYTES NFR BLD: 14.9 % (ref 20–42)
MCH RBC QN AUTO: 31.9 PG (ref 26–35)
MCHC RBC AUTO-ENTMCNC: 32.8 % (ref 32–34.5)
MCV RBC AUTO: 97.5 FL (ref 80–99.9)
MONOCYTES # BLD: 0.97 E9/L (ref 0.1–0.95)
MONOCYTES NFR BLD: 11.7 % (ref 2–12)
NEUTROPHILS # BLD: 5.84 E9/L (ref 1.8–7.3)
NEUTS SEG NFR BLD: 70.8 % (ref 43–80)
PLATELET # BLD AUTO: 306 E9/L (ref 130–450)
PMV BLD AUTO: 9.8 FL (ref 7–12)
POTASSIUM SERPL-SCNC: 3.8 MMOL/L (ref 3.5–5)
PROCALCITONIN: 0.08 NG/ML (ref 0–0.08)
RBC # BLD AUTO: 4.32 E12/L (ref 3.5–5.5)
SODIUM SERPL-SCNC: 138 MMOL/L (ref 132–146)
WBC # BLD: 8.3 E9/L (ref 4.5–11.5)

## 2023-05-30 PROCEDURE — 2580000003 HC RX 258: Performed by: EMERGENCY MEDICINE

## 2023-05-30 PROCEDURE — 87186 SC STD MICRODIL/AGAR DIL: CPT

## 2023-05-30 PROCEDURE — 73130 X-RAY EXAM OF HAND: CPT

## 2023-05-30 PROCEDURE — 3079F DIAST BP 80-89 MM HG: CPT | Performed by: FAMILY MEDICINE

## 2023-05-30 PROCEDURE — 99284 EMERGENCY DEPT VISIT MOD MDM: CPT

## 2023-05-30 PROCEDURE — 87040 BLOOD CULTURE FOR BACTERIA: CPT

## 2023-05-30 PROCEDURE — 85025 COMPLETE CBC W/AUTO DIFF WBC: CPT

## 2023-05-30 PROCEDURE — 80048 BASIC METABOLIC PNL TOTAL CA: CPT

## 2023-05-30 PROCEDURE — 84145 PROCALCITONIN (PCT): CPT

## 2023-05-30 PROCEDURE — 87077 CULTURE AEROBIC IDENTIFY: CPT

## 2023-05-30 PROCEDURE — 93971 EXTREMITY STUDY: CPT

## 2023-05-30 PROCEDURE — 6360000002 HC RX W HCPCS: Performed by: EMERGENCY MEDICINE

## 2023-05-30 PROCEDURE — 3074F SYST BP LT 130 MM HG: CPT | Performed by: FAMILY MEDICINE

## 2023-05-30 PROCEDURE — 87070 CULTURE OTHR SPECIMN AEROBIC: CPT

## 2023-05-30 PROCEDURE — 96365 THER/PROPH/DIAG IV INF INIT: CPT

## 2023-05-30 PROCEDURE — 99213 OFFICE O/P EST LOW 20 MIN: CPT | Performed by: FAMILY MEDICINE

## 2023-05-30 PROCEDURE — 1124F ACP DISCUSS-NO DSCNMKR DOCD: CPT | Performed by: FAMILY MEDICINE

## 2023-05-30 RX ORDER — AMOXICILLIN AND CLAVULANATE POTASSIUM 875; 125 MG/1; MG/1
1 TABLET, FILM COATED ORAL 2 TIMES DAILY
Qty: 20 TABLET | Refills: 0 | Status: SHIPPED | OUTPATIENT
Start: 2023-05-30 | End: 2023-06-09

## 2023-05-30 RX ADMIN — PIPERACILLIN AND TAZOBACTAM 3375 MG: 3; .375 INJECTION, POWDER, LYOPHILIZED, FOR SOLUTION INTRAVENOUS at 11:54

## 2023-05-30 ASSESSMENT — ENCOUNTER SYMPTOMS
VOMITING: 0
SHORTNESS OF BREATH: 0
BACK PAIN: 0
COLOR CHANGE: 1
NAUSEA: 0
ROS SKIN COMMENTS: RIGHT HAND
CHEST TIGHTNESS: 0
EYE PAIN: 0
EYE DISCHARGE: 0
BACK PAIN: 0
SHORTNESS OF BREATH: 0
ALLERGIC/IMMUNOLOGIC NEGATIVE: 1
DIARRHEA: 0
VOMITING: 0
BLOOD IN STOOL: 0
DIARRHEA: 0
WHEEZING: 0
SINUS PAIN: 0
SORE THROAT: 0
COUGH: 0
SORE THROAT: 0
EYE REDNESS: 0
NAUSEA: 0
ABDOMINAL PAIN: 0
PHOTOPHOBIA: 0
COUGH: 0
ABDOMINAL DISTENTION: 0
TROUBLE SWALLOWING: 0

## 2023-05-30 ASSESSMENT — PAIN - FUNCTIONAL ASSESSMENT: PAIN_FUNCTIONAL_ASSESSMENT: NONE - DENIES PAIN

## 2023-05-30 NOTE — PROGRESS NOTES
23  Nick Mayer : 1940 Sex: female  Age: 81 y.o. Assessment and Plan:  Wilfredo Moody was seen today for wound infection. Diagnoses and all orders for this visit:    Cellulitis of right hand    This injury is actually been since being seen last Thursday. This is despite 2 antibiotics as well. Will be sent to [facility] emergency room for more exhaustive testing possible admission with IV antibiotics. Patient and daughters were understanding and in route. Emergency room was notified by me. No follow-ups on file. Chief Complaint   Patient presents with    Wound Infection       Patient returns for recheck cellulitis of the right hand and wrist.  She was seen [facility] last Thursday for same and treated with doxycycline and cephalexin. He returns today with increased redness and swelling of her second and third fingers and pain in the dorsum of the hand. She denies fever, chills, nausea, vomiting. Review of Systems   Constitutional:  Negative for appetite change, fatigue and unexpected weight change. HENT:  Negative for congestion, ear pain, hearing loss, sinus pain, sore throat and trouble swallowing. Eyes:  Negative for photophobia, pain, discharge and redness. Respiratory:  Negative for cough, chest tightness and shortness of breath. Cardiovascular:  Negative for chest pain, palpitations and leg swelling. Gastrointestinal:  Negative for abdominal pain, blood in stool, diarrhea, nausea and vomiting. Endocrine: Negative. Genitourinary:  Negative for dysuria, flank pain, frequency and hematuria. Musculoskeletal:  Negative for arthralgias, back pain, joint swelling and myalgias. Skin:  Positive for color change and wound. Right hand   Allergic/Immunologic: Negative. Neurological:  Negative for dizziness, seizures, syncope, weakness, light-headedness, numbness and headaches. Hematological:  Negative for adenopathy. Does not bruise/bleed easily.

## 2023-05-30 NOTE — ED PROVIDER NOTES
1 week ago, patient sustained a very small laceration to the dorsum of the right hand from her fingernail. On Thursday, she went to local urgent care secondary to the erythematous changes to the hand. She was given a shot of antibiotic and placed on Keflex and doxycycline. However, she states the redness and swelling has gotten worse and now includes her second and third fingers as well as the dorsum of her hand. She denies fever or chills. The history is provided by the patient and a relative. Rash  Location:  Hand  Hand rash location:  R hand and R fingers  Quality: redness and swelling    Severity:  Moderate  Onset quality:  Gradual  Duration:  1 week  Timing:  Constant  Progression:  Worsening  Chronicity:  New  Relieved by:  Nothing  Worsened by:  Nothing  Ineffective treatments: Keflex, doxycycline. Associated symptoms: joint pain    Associated symptoms: no diarrhea, no fatigue, no fever, no myalgias, no nausea, no shortness of breath, no sore throat, not vomiting and not wheezing       Review of Systems   Constitutional:  Negative for activity change, appetite change, chills, diaphoresis, fatigue and fever. HENT:  Negative for sore throat. Respiratory:  Negative for cough, shortness of breath and wheezing. Cardiovascular:  Negative for chest pain. Gastrointestinal:  Negative for abdominal distention, diarrhea, nausea and vomiting. Genitourinary:  Negative for dysuria. Musculoskeletal:  Positive for arthralgias. Negative for back pain and myalgias. Skin:  Positive for rash. Negative for wound. Neurological:  Negative for weakness. All other systems reviewed and are negative. Physical Exam  Vitals and nursing note reviewed. Constitutional:       General: She is not in acute distress. Appearance: She is well-developed. She is not ill-appearing. HENT:      Head: Normocephalic and atraumatic. Eyes:      Pupils: Pupils are equal, round, and reactive to light.

## 2023-06-02 LAB
BACTERIA WND AEROBE CULT: ABNORMAL
BACTERIA WND AEROBE CULT: ABNORMAL
ORGANISM: ABNORMAL
ORGANISM: ABNORMAL

## 2023-06-04 LAB
BACTERIA BLD CULT ORG #2: NORMAL
BACTERIA BLD CULT: NORMAL

## 2023-06-28 ENCOUNTER — OFFICE VISIT (OUTPATIENT)
Dept: FAMILY MEDICINE CLINIC | Age: 83
End: 2023-06-28
Payer: MEDICARE

## 2023-06-28 VITALS
DIASTOLIC BLOOD PRESSURE: 84 MMHG | OXYGEN SATURATION: 96 % | SYSTOLIC BLOOD PRESSURE: 124 MMHG | RESPIRATION RATE: 17 BRPM | TEMPERATURE: 97.2 F | HEIGHT: 59 IN | HEART RATE: 76 BPM | WEIGHT: 119 LBS | BODY MASS INDEX: 23.99 KG/M2

## 2023-06-28 DIAGNOSIS — M10.9 ACUTE GOUT OF RIGHT HAND, UNSPECIFIED CAUSE: Primary | ICD-10-CM

## 2023-06-28 DIAGNOSIS — M10.9 ACUTE GOUT OF RIGHT HAND, UNSPECIFIED CAUSE: ICD-10-CM

## 2023-06-28 LAB
ERYTHROCYTE [SEDIMENTATION RATE] IN BLOOD BY WESTERGREN METHOD: 18 MM/HR (ref 0–20)
URATE SERPL-MCNC: 6.1 MG/DL (ref 2.4–5.7)

## 2023-06-28 PROCEDURE — 3074F SYST BP LT 130 MM HG: CPT | Performed by: FAMILY MEDICINE

## 2023-06-28 PROCEDURE — 3079F DIAST BP 80-89 MM HG: CPT | Performed by: FAMILY MEDICINE

## 2023-06-28 PROCEDURE — 1124F ACP DISCUSS-NO DSCNMKR DOCD: CPT | Performed by: FAMILY MEDICINE

## 2023-06-28 PROCEDURE — 99213 OFFICE O/P EST LOW 20 MIN: CPT | Performed by: FAMILY MEDICINE

## 2023-06-28 RX ORDER — COLCHICINE 0.6 MG/1
0.6 TABLET ORAL DAILY
Qty: 15 TABLET | Refills: 1 | Status: SHIPPED | OUTPATIENT
Start: 2023-06-28

## 2023-06-28 ASSESSMENT — ENCOUNTER SYMPTOMS
SINUS PAIN: 0
DIARRHEA: 0
EYE REDNESS: 0
COUGH: 0
BLOOD IN STOOL: 0
ABDOMINAL PAIN: 0
BACK PAIN: 0
PHOTOPHOBIA: 0
CHEST TIGHTNESS: 0
NAUSEA: 0
EYE DISCHARGE: 0
SHORTNESS OF BREATH: 0
EYE PAIN: 0
SORE THROAT: 0
ALLERGIC/IMMUNOLOGIC NEGATIVE: 1
TROUBLE SWALLOWING: 0
VOMITING: 0

## 2023-07-07 ENCOUNTER — OFFICE VISIT (OUTPATIENT)
Dept: FAMILY MEDICINE CLINIC | Age: 83
End: 2023-07-07

## 2023-07-07 VITALS
DIASTOLIC BLOOD PRESSURE: 66 MMHG | HEART RATE: 76 BPM | WEIGHT: 117 LBS | RESPIRATION RATE: 17 BRPM | TEMPERATURE: 97.6 F | HEIGHT: 59 IN | OXYGEN SATURATION: 95 % | BODY MASS INDEX: 23.59 KG/M2 | SYSTOLIC BLOOD PRESSURE: 102 MMHG

## 2023-07-07 DIAGNOSIS — M10.9 ACUTE GOUT OF RIGHT HAND, UNSPECIFIED CAUSE: ICD-10-CM

## 2023-07-07 DIAGNOSIS — I10 PRIMARY HYPERTENSION: Primary | ICD-10-CM

## 2023-07-07 DIAGNOSIS — R60.0 LOCALIZED EDEMA: ICD-10-CM

## 2023-07-07 ASSESSMENT — ENCOUNTER SYMPTOMS
EYE REDNESS: 0
COUGH: 0
ALLERGIC/IMMUNOLOGIC NEGATIVE: 1
CHEST TIGHTNESS: 0
PHOTOPHOBIA: 0
ABDOMINAL PAIN: 0
SORE THROAT: 0
VOMITING: 0
BLOOD IN STOOL: 0
TROUBLE SWALLOWING: 0
EYE PAIN: 0
SHORTNESS OF BREATH: 0
BACK PAIN: 0
SINUS PAIN: 0
DIARRHEA: 0
EYE DISCHARGE: 0
NAUSEA: 0

## 2023-07-07 NOTE — PROGRESS NOTES
23  Summer Tavares : 1940 Sex: female  Age: 81 y.o. Assessment and Plan:  Edwin Duran was seen today for gout. Diagnoses and all orders for this visit:    Primary hypertension    Acute gout of right hand, unspecified cause    Localized edema    Restart Lasix taking it  and Friday for her legs. Continue and finish colchicine. Recheck 1 month    No follow-ups on file. Chief Complaint   Patient presents with    Gout       Patient returns for recheck right hand. The redness is and edema is almost all resolved. Stiffness is proved. She is not having any pain. Continues of swollen ankles is not taking her Lasix. Review of Systems   Constitutional:  Negative for appetite change, fatigue and unexpected weight change. HENT:  Negative for congestion, ear pain, hearing loss, sinus pain, sore throat and trouble swallowing. Eyes:  Negative for photophobia, pain, discharge and redness. Respiratory:  Negative for cough, chest tightness and shortness of breath. Cardiovascular:  Positive for leg swelling. Negative for chest pain and palpitations. Gastrointestinal:  Negative for abdominal pain, blood in stool, diarrhea, nausea and vomiting. Endocrine: Negative. Genitourinary:  Negative for dysuria, flank pain, frequency and hematuria. Musculoskeletal:  Positive for arthralgias, joint swelling and myalgias. Negative for back pain. Right hand   Skin: Negative. Allergic/Immunologic: Negative. Neurological:  Negative for dizziness, seizures, syncope, weakness, light-headedness, numbness and headaches. Hematological:  Negative for adenopathy. Does not bruise/bleed easily. Psychiatric/Behavioral: Negative.          Current Outpatient Medications:     colchicine (COLCRYS) 0.6 MG tablet, Take 1 tablet by mouth daily, Disp: 15 tablet, Rfl: 1    rosuvastatin (CRESTOR) 20 MG tablet, Take 1 tablet by mouth nightly, Disp: 30 tablet, Rfl: 3    mirabegron (MYRBETRIQ)

## 2023-07-20 DIAGNOSIS — M10.9 ACUTE GOUT OF RIGHT HAND, UNSPECIFIED CAUSE: ICD-10-CM

## 2023-07-20 NOTE — TELEPHONE ENCOUNTER
Patient given short term Rx 6/28/23 for colchicine w/ 1 refill. On 7/7/23 Dr Hilario Boateng said to finish Rx and follow up in 1 month. Refill may not be appropriate and may have been an automatic refill request from pharmacy.      Message left for patient requesting a return call

## 2023-07-21 RX ORDER — COLCHICINE 0.6 MG/1
TABLET ORAL
Qty: 15 TABLET | Refills: 1 | OUTPATIENT
Start: 2023-07-21

## 2023-07-21 NOTE — TELEPHONE ENCOUNTER
Patient's daughter called back and said that they do not need the refill requested by the pharmacy. She does have an appointment with Dr Oma Washington on Monday.

## 2023-07-24 ENCOUNTER — OFFICE VISIT (OUTPATIENT)
Dept: FAMILY MEDICINE CLINIC | Age: 83
End: 2023-07-24
Payer: MEDICARE

## 2023-07-24 VITALS
WEIGHT: 116.4 LBS | TEMPERATURE: 97.6 F | OXYGEN SATURATION: 98 % | DIASTOLIC BLOOD PRESSURE: 78 MMHG | HEART RATE: 50 BPM | SYSTOLIC BLOOD PRESSURE: 132 MMHG | BODY MASS INDEX: 26.94 KG/M2 | HEIGHT: 55 IN

## 2023-07-24 DIAGNOSIS — M10.9 ACUTE GOUT OF RIGHT HAND, UNSPECIFIED CAUSE: Primary | ICD-10-CM

## 2023-07-24 DIAGNOSIS — H61.21 IMPACTED CERUMEN OF RIGHT EAR: ICD-10-CM

## 2023-07-24 PROCEDURE — 1124F ACP DISCUSS-NO DSCNMKR DOCD: CPT | Performed by: FAMILY MEDICINE

## 2023-07-24 PROCEDURE — 3078F DIAST BP <80 MM HG: CPT | Performed by: FAMILY MEDICINE

## 2023-07-24 PROCEDURE — 3075F SYST BP GE 130 - 139MM HG: CPT | Performed by: FAMILY MEDICINE

## 2023-07-24 PROCEDURE — 99213 OFFICE O/P EST LOW 20 MIN: CPT | Performed by: FAMILY MEDICINE

## 2023-07-24 RX ORDER — FUROSEMIDE 20 MG/1
20 TABLET ORAL
COMMUNITY

## 2023-07-24 RX ORDER — COLCHICINE 0.6 MG/1
0.6 TABLET ORAL DAILY
Qty: 30 TABLET | Refills: 3 | Status: SHIPPED | OUTPATIENT
Start: 2023-07-24

## 2023-07-24 ASSESSMENT — ENCOUNTER SYMPTOMS
TROUBLE SWALLOWING: 0
COUGH: 0
ABDOMINAL PAIN: 0
VOMITING: 0
SINUS PAIN: 0
ALLERGIC/IMMUNOLOGIC NEGATIVE: 1
EYE PAIN: 0
DIARRHEA: 0
PHOTOPHOBIA: 0
BLOOD IN STOOL: 0
BACK PAIN: 0
EYE DISCHARGE: 0
SORE THROAT: 0
EYE REDNESS: 0
CHEST TIGHTNESS: 0
NAUSEA: 0
SHORTNESS OF BREATH: 0

## 2023-07-24 NOTE — PROGRESS NOTES
Rfl:     colchicine (COLCRYS) 0.6 MG tablet, Take 1 tablet by mouth daily, Disp: 30 tablet, Rfl: 3    rosuvastatin (CRESTOR) 20 MG tablet, Take 1 tablet by mouth nightly, Disp: 30 tablet, Rfl: 3    mirabegron (MYRBETRIQ) 25 MG TB24, Take 2 tablets by mouth daily, Disp: , Rfl:     miconazole (MICOTIN) 2 % powder, Apply topically 2 times daily. , Disp: 45 g, Rfl: 0    Handicap Placard MISC, by Does not apply route , Disp: , Rfl:     carbidopa-levodopa (SINEMET)  MG per tablet, 2.5 @ 7am 2.5 @ 11am 2.5 @ 3pm 2 @ 7pm, Disp: , Rfl: 0    ropinirole (REQUIP) 1 MG tablet, Take 8 tablets by mouth every morning (before breakfast), Disp: , Rfl:     nortriptyline (PAMELOR) 10 MG capsule, Take 1 capsule by mouth nightly, Disp: , Rfl:   No Known Allergies    Past Medical History:   Diagnosis Date    Degenerative joint disease involving multiple joints     Hyperlipidemia     Hypertension     Parkinson disease ([address])     Skin cancer     scc     Past Surgical History:   Procedure Laterality Date    KNEE SURGERY  left    TONSILLECTOMY AND ADENOIDECTOMY       Family History   Problem Relation Age of Onset    No Known Problems Mother     No Known Problems Father      Social History     Socioeconomic History    Marital status:      Spouse name: Not on file    Number of children: Not on file    Years of education: Not on file    Highest education level: Not on file   Occupational History    Not on file   Tobacco Use    Smoking status: Never    Smokeless tobacco: Never   Vaping Use    Vaping Use: Never used   Substance and Sexual Activity    Alcohol use: Not Currently    Drug use: No    Sexual activity: Not Currently   Other Topics Concern    Not on file   Social History Narrative    Not on file     Social Determinants of Health     Financial Resource Strain: Low Risk     Difficulty of Paying Living Expenses: Not hard at all   Food Insecurity: No Food Insecurity    Worried About Running Out of Food in the Last Year: Never true

## 2023-07-27 ENCOUNTER — OFFICE VISIT (OUTPATIENT)
Dept: FAMILY MEDICINE CLINIC | Age: 83
End: 2023-07-27
Payer: MEDICARE

## 2023-07-27 VITALS
OXYGEN SATURATION: 98 % | RESPIRATION RATE: 16 BRPM | HEIGHT: 59 IN | HEART RATE: 78 BPM | WEIGHT: 116 LBS | BODY MASS INDEX: 23.39 KG/M2 | DIASTOLIC BLOOD PRESSURE: 70 MMHG | SYSTOLIC BLOOD PRESSURE: 122 MMHG | TEMPERATURE: 98.5 F

## 2023-07-27 DIAGNOSIS — H61.21 IMPACTED CERUMEN, RIGHT EAR: Primary | ICD-10-CM

## 2023-07-27 DIAGNOSIS — I10 PRIMARY HYPERTENSION: ICD-10-CM

## 2023-07-27 PROCEDURE — 3078F DIAST BP <80 MM HG: CPT | Performed by: FAMILY MEDICINE

## 2023-07-27 PROCEDURE — 1124F ACP DISCUSS-NO DSCNMKR DOCD: CPT | Performed by: FAMILY MEDICINE

## 2023-07-27 PROCEDURE — 99213 OFFICE O/P EST LOW 20 MIN: CPT | Performed by: FAMILY MEDICINE

## 2023-07-27 PROCEDURE — 3074F SYST BP LT 130 MM HG: CPT | Performed by: FAMILY MEDICINE

## 2023-07-27 ASSESSMENT — ENCOUNTER SYMPTOMS
CHEST TIGHTNESS: 0
EYE REDNESS: 0
BLOOD IN STOOL: 0
DIARRHEA: 0
ALLERGIC/IMMUNOLOGIC NEGATIVE: 1
SHORTNESS OF BREATH: 0
TROUBLE SWALLOWING: 0
PHOTOPHOBIA: 0
SINUS PAIN: 0
NAUSEA: 0
BACK PAIN: 0
ABDOMINAL PAIN: 0
VOMITING: 0
SORE THROAT: 0
EYE PAIN: 0
COUGH: 0
EYE DISCHARGE: 0

## 2023-07-27 NOTE — PROGRESS NOTES
nightly, Disp: 30 tablet, Rfl: 3    mirabegron (MYRBETRIQ) 25 MG TB24, Take 2 tablets by mouth daily, Disp: , Rfl:     miconazole (MICOTIN) 2 % powder, Apply topically 2 times daily. , Disp: 45 g, Rfl: 0    Handicap Placard MISC, by Does not apply route , Disp: , Rfl:     carbidopa-levodopa (SINEMET)  MG per tablet, 2.5 @ 7am 2.5 @ 11am 2.5 @ 3pm 2 @ 7pm, Disp: , Rfl: 0    ropinirole (REQUIP) 1 MG tablet, Take 8 tablets by mouth every morning (before breakfast), Disp: , Rfl:     nortriptyline (PAMELOR) 10 MG capsule, Take 1 capsule by mouth nightly, Disp: , Rfl:   No Known Allergies    Past Medical History:   Diagnosis Date    Degenerative joint disease involving multiple joints     Hyperlipidemia     Hypertension     Parkinson disease ([address] )     Skin cancer     scc     Past Surgical History:   Procedure Laterality Date    KNEE SURGERY  left    TONSILLECTOMY AND ADENOIDECTOMY       Family History   Problem Relation Age of Onset    No Known Problems Mother     No Known Problems Father      Social History     Socioeconomic History    Marital status:      Spouse name: Not on file    Number of children: Not on file    Years of education: Not on file    Highest education level: Not on file   Occupational History    Not on file   Tobacco Use    Smoking status: Never    Smokeless tobacco: Never   Vaping Use    Vaping Use: Never used   Substance and Sexual Activity    Alcohol use: Not Currently    Drug use: No    Sexual activity: Not Currently   Other Topics Concern    Not on file   Social History Narrative    Not on file     Social Determinants of Health     Financial Resource Strain: Low Risk     Difficulty of Paying Living Expenses: Not hard at all   Food Insecurity: No Food Insecurity    Worried About Running Out of Food in the Last Year: Never true    Ran Out of Food in the Last Year: Never true   Transportation Needs: Unknown    Lack of Transportation (Medical):  Not on file    Lack of Transportation

## 2023-08-09 DIAGNOSIS — E78.2 MIXED HYPERLIPIDEMIA: ICD-10-CM

## 2023-08-09 RX ORDER — ROSUVASTATIN CALCIUM 20 MG/1
20 TABLET, COATED ORAL NIGHTLY
Qty: 30 TABLET | Refills: 3 | OUTPATIENT
Start: 2023-08-09

## 2023-08-21 ENCOUNTER — TELEPHONE (OUTPATIENT)
Dept: FAMILY MEDICINE CLINIC | Age: 83
End: 2023-08-21

## 2023-08-21 DIAGNOSIS — G20 PARKINSON DISEASE (HCC): Primary | ICD-10-CM

## 2023-08-21 NOTE — TELEPHONE ENCOUNTER
----- Message from Rayne Luis sent at 8/21/2023 10:37 AM EDT -----  Subject: Referral Request    Reason for referral request? [facility] calling to get PT orders for   balance training with Parkinson's. Satish Cardona [phone] PHONE [phone]  Provider patient wants to be referred to(if known):     Provider Phone Number(if known):     Additional Information for Provider?   ---------------------------------------------------------------------------  --------------  [address]    [phone]; OK to leave message on voicemail  ---------------------------------------------------------------------------  --------------

## 2023-08-21 NOTE — TELEPHONE ENCOUNTER
Called back, Karl Hazel is who they said I needed to speak with . She was gone for today , she should call in morning(Tuesday). Is there a prescripted order or do they need an order from us, exactly what does it need to say?

## 2023-08-22 NOTE — TELEPHONE ENCOUNTER
Spoke w/ Keegan Payne at [facility], patient is now a resident there and would like to do home PT. They are not able to send an order. Order is ready to be signed and faxed.

## 2023-08-23 ENCOUNTER — TELEPHONE (OUTPATIENT)
Dept: FAMILY MEDICINE CLINIC | Age: 83
End: 2023-08-23

## 2023-08-23 DIAGNOSIS — N39.44 URINARY INCONTINENCE, NOCTURNAL ENURESIS: Primary | ICD-10-CM

## 2023-08-23 DIAGNOSIS — R30.0 DYSURIA: Primary | ICD-10-CM

## 2023-08-23 DIAGNOSIS — N39.44 URINARY INCONTINENCE, NOCTURNAL ENURESIS: ICD-10-CM

## 2023-08-23 RX ORDER — NITROFURANTOIN 25; 75 MG/1; MG/1
100 CAPSULE ORAL 2 TIMES DAILY
Qty: 20 CAPSULE | Refills: 0 | Status: SHIPPED
Start: 2023-08-23 | End: 2023-08-26

## 2023-08-23 NOTE — TELEPHONE ENCOUNTER
We will treat empirically with nitrofurantoin twice a day for the next 10 days. This may be adjusted depending on results of the culture.

## 2023-08-23 NOTE — TELEPHONE ENCOUNTER
Patient has had nocturnal incontinence x's 3 days. So [facility] home suggested she may have a UTI.    Daughter was told that the home had called us and checked on this and to  sample and bring it into us

## 2023-08-26 LAB
CULTURE: ABNORMAL
CULTURE: ABNORMAL
SPECIMEN DESCRIPTION: ABNORMAL

## 2023-08-26 RX ORDER — CIPROFLOXACIN 500 MG/1
500 TABLET, FILM COATED ORAL 2 TIMES DAILY
Qty: 14 TABLET | Refills: 0 | Status: SHIPPED | OUTPATIENT
Start: 2023-08-26 | End: 2023-09-02

## 2023-09-06 ENCOUNTER — OFFICE VISIT (OUTPATIENT)
Dept: FAMILY MEDICINE CLINIC | Age: 83
End: 2023-09-06
Payer: MEDICARE

## 2023-09-06 VITALS
TEMPERATURE: 97.8 F | OXYGEN SATURATION: 98 % | SYSTOLIC BLOOD PRESSURE: 120 MMHG | DIASTOLIC BLOOD PRESSURE: 68 MMHG | BODY MASS INDEX: 22.18 KG/M2 | HEIGHT: 59 IN | WEIGHT: 110 LBS | HEART RATE: 80 BPM | RESPIRATION RATE: 18 BRPM

## 2023-09-06 DIAGNOSIS — E78.2 MIXED HYPERLIPIDEMIA: ICD-10-CM

## 2023-09-06 DIAGNOSIS — R82.90 URINE FINDING: ICD-10-CM

## 2023-09-06 DIAGNOSIS — M10.9 ACUTE GOUT OF RIGHT HAND, UNSPECIFIED CAUSE: ICD-10-CM

## 2023-09-06 DIAGNOSIS — R82.90 URINE FINDING: Primary | ICD-10-CM

## 2023-09-06 LAB
BILIRUBIN, POC: NEGATIVE
BLOOD URINE, POC: NORMAL
CLARITY, POC: CLEAR
COLOR, POC: YELLOW
GLUCOSE URINE, POC: NEGATIVE
KETONES, POC: NORMAL
LEUKOCYTE EST, POC: NEGATIVE
NITRITE, POC: NEGATIVE
PH, POC: 5
PROTEIN, POC: 100
SPECIFIC GRAVITY, POC: 1.02
UROBILINOGEN, POC: 0.2

## 2023-09-06 PROCEDURE — 3074F SYST BP LT 130 MM HG: CPT | Performed by: FAMILY MEDICINE

## 2023-09-06 PROCEDURE — 99213 OFFICE O/P EST LOW 20 MIN: CPT | Performed by: FAMILY MEDICINE

## 2023-09-06 PROCEDURE — 3078F DIAST BP <80 MM HG: CPT | Performed by: FAMILY MEDICINE

## 2023-09-06 PROCEDURE — 1124F ACP DISCUSS-NO DSCNMKR DOCD: CPT | Performed by: FAMILY MEDICINE

## 2023-09-06 PROCEDURE — 81002 URINALYSIS NONAUTO W/O SCOPE: CPT | Performed by: FAMILY MEDICINE

## 2023-09-06 RX ORDER — ROSUVASTATIN CALCIUM 20 MG/1
20 TABLET, COATED ORAL NIGHTLY
Qty: 30 TABLET | Refills: 3 | Status: SHIPPED | OUTPATIENT
Start: 2023-09-06

## 2023-09-06 ASSESSMENT — ENCOUNTER SYMPTOMS
EYE REDNESS: 0
SINUS PAIN: 0
TROUBLE SWALLOWING: 0
PHOTOPHOBIA: 0
NAUSEA: 0
EYE PAIN: 0
DIARRHEA: 0
SORE THROAT: 0
ALLERGIC/IMMUNOLOGIC NEGATIVE: 1
BLOOD IN STOOL: 0
EYE DISCHARGE: 0
SHORTNESS OF BREATH: 0
VOMITING: 0
ABDOMINAL PAIN: 0
CHEST TIGHTNESS: 0
BACK PAIN: 0
COUGH: 0

## 2023-09-06 NOTE — PROGRESS NOTES
23  Missy Clock : 1940 Sex: female  Age: 81 y.o. Assessment and Plan:  Lester Diaz was seen today for follow-up and medication check. Diagnoses and all orders for this visit:    Urine finding  -     POCT Urinalysis no Micro  -     Culture, Urine; Future  She is currently asymptomatic. We will await culture reports before any further treatment. Mixed hyperlipidemia  -     rosuvastatin (CRESTOR) 20 MG tablet; Take 1 tablet by mouth nightly    Acute gout of right hand, unspecified cause  Restart the colchicine twice a day for the first 4 days then back to once a day. Recheck 3 to 5 days if not improving. Return in about 4 months (around 2024). Chief Complaint   Patient presents with    Follow-up     Patient recently had just gotten over a UTI-   Gave a re check urine     Medication Check       Here for recheck UTI. She developed some incontinence and was found to have Klebsiella urinary tract infection. She has completed her antibiotics and is feeling well. She also has a flareup of her gout in the right thumb and pointer finger. She did      Review of Systems   Constitutional:  Negative for appetite change, fatigue and unexpected weight change. HENT:  Negative for congestion, ear pain, hearing loss, sinus pain, sore throat and trouble swallowing. Eyes:  Negative for photophobia, pain, discharge and redness. Respiratory:  Negative for cough, chest tightness and shortness of breath. Cardiovascular:  Negative for chest pain, palpitations and leg swelling. Gastrointestinal:  Negative for abdominal pain, blood in stool, diarrhea, nausea and vomiting. Endocrine: Negative. Genitourinary:  Negative for dysuria, flank pain, frequency and hematuria. Musculoskeletal:  Positive for arthralgias and joint swelling. Negative for back pain and myalgias. Right hand   Skin: Negative. Allergic/Immunologic: Negative.     Neurological:  Negative for dizziness, seizures,

## 2023-09-08 LAB
CULTURE: ABNORMAL
CULTURE: ABNORMAL
SPECIMEN DESCRIPTION: ABNORMAL

## 2023-10-19 NOTE — CARE COORDINATION
[facility] Transitions Initial Follow Up Call    Call within 2 business days of discharge: Yes    Patient: Eloy Moses Patient : 1940   MRN: <[id number]>  Reason for Admission: -21 Acute Cystitis w/o Hematuria; Altered Mental Status; Dehydration  Discharge Date: 21 RARS: Readmission Risk Score: 11      Last Discharge [id number] [address]       Complaint Diagnosis Description Type Department Provider    21 Altered Mental Status Acute cystitis without hematuria . .. ED to Hosp-Admission (Discharged) (ADMITTED) [facility] Henry MD; Dar Russell. .. Spoke with: Pt's daughter Robert Slade states that she has a cousin who is a retired [employer] nurse staying with Pt until Friday and she is concerned that the Pt will not be safe at home alone. Spoke to her about private home aide, [facility] and Assisted Living options. In box message sent to Abraham Carranza [address] for assistance. [place] states [facility] is scheduled to come out this morning and Pt has PCP appt this afternoon. [place] will also discuss concerns with PCP. Discussed Falls precautions safety issues when ambulating and showering. V/U. Unable to do medication review at this time. Unable to get update on Pt condition at this time; daughter has not checked in with cousin yet this morning. Daughter given CTN contact information. Denies transportation, home, or medication needs at this time. [facility]: [facility]    Non-face-to-face services provided:    Transitions of Care Initial Call    Was this an external facility discharge? No Discharge Facility:     Challenges to be reviewed by the provider   Additional needs identified to be addressed with provider: Yes  Home Aide sevices, [facility] options, Assisted Living options       Method of communication with provider : chart routing      Advance Care Planning:   Does patient have an Advance Directive: reviewed and current. Was this a readmission?  No  Patient stated reason for admission: UTI; dehydration  Patients top risk factors for readmission: medical condition-UTI, Parkinson Disease    Care Transition Nurse (CTN) contacted the family by telephone to perform post hospital discharge assessment. Verified name and  with family as identifiers. Provided introduction to self, and explanation of the CTN role. CTN reviewed discharge instructions, medical action plan and red flags with family who verbalized understanding. Family given an opportunity to ask questions and does not have any further questions or concerns at this time. Were discharge instructions available to patient? Yes. Reviewed appropriate site of care based on symptoms and resources available to patient including: PCP, When to call 911 and [address]. The family agrees to contact the PCP office for questions related to their healthcare. Medication reconciliation was performed with unable to review at time of call, who verbalizes understanding of administration of home medications. Advised obtaining a 90-day supply of all daily and as-needed medications. Covid Risk Education     Educated patient about risk for severe COVID-19 due to risk factors according to CDC guidelines. LPN CC reviewed discharge instructions, medical action plan and red flag symptoms with the family who verbalized understanding. Discussed COVID vaccination status: Yes. Education provided on COVID-19 vaccination as appropriate. Discussed exposure protocols and quarantine with CDC Guidelines. Family was given an opportunity to verbalize any questions and concerns and agrees to contact LPN CC or health care provider for questions related to their healthcare. Reviewed and educated family on any new and changed medications related to discharge diagnosis. Was patient discharged with a pulse oximeter? No Discussed and confirmed pulse oximeter discharge instructions and when to notify provider or seek emergency care.        LPN CC provided contact information. Plan for follow-up call in 5-7 days based on severity of symptoms and risk factors.   Plan for next call: symptom management-Falls, UTI s/s, follow up appointment-PCP F/U and community resources-LSW - [facility], [address], [place]        Care Transitions 24 Hour Call    Schedule Follow Up Appointment with PCP: Completed  Do you have all of your prescriptions and are they filled?: Yes  Have you been contacted by a [address]?: No  Have you scheduled your follow up appointment?: Yes  How are you going to get to your appointment?: Car - family or friend to transport  Were you discharged with any Home Care or Post Acute Services: Yes  Post Acute Services: [address] (Comment: [facility] [phone])  Do you feel like you have everything you need to keep you well at home?: No  Care Transitions Interventions    Social Work: Completed           Follow Up  Future Appointments   Date Time Provider Blanca Barrzaa   9/15/2021  2:00 PM April Tavares, [address], LPN 3 = A little assistance

## 2023-10-24 ENCOUNTER — OFFICE VISIT (OUTPATIENT)
Dept: FAMILY MEDICINE CLINIC | Age: 83
End: 2023-10-24
Payer: MEDICARE

## 2023-10-24 VITALS
OXYGEN SATURATION: 97 % | BODY MASS INDEX: 22.22 KG/M2 | SYSTOLIC BLOOD PRESSURE: 120 MMHG | HEART RATE: 52 BPM | WEIGHT: 110 LBS | RESPIRATION RATE: 16 BRPM | DIASTOLIC BLOOD PRESSURE: 84 MMHG | TEMPERATURE: 97.2 F
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
## 2023-10-24 DIAGNOSIS — S00.31XA ABRASION OF NOSE, INITIAL ENCOUNTER: ICD-10-CM

## 2023-10-24 DIAGNOSIS — S60.419A ABRASION OF FINGER, INITIAL ENCOUNTER: ICD-10-CM

## 2023-10-24 DIAGNOSIS — S00.33XA CONTUSION OF NOSE, INITIAL ENCOUNTER: ICD-10-CM

## 2023-10-24 DIAGNOSIS — G20.A1 PARKINSON'S DISEASE WITHOUT FLUCTUATING MANIFESTATIONS, UNSPECIFIED WHETHER DYSKINESIA PRESENT: ICD-10-CM

## 2023-10-24 PROCEDURE — 3079F DIAST BP 80-89 MM HG: CPT | Performed by: FAMILY MEDICINE

## 2023-10-24 PROCEDURE — 1124F ACP DISCUSS-NO DSCNMKR DOCD: CPT | Performed by: FAMILY MEDICINE

## 2023-10-24 PROCEDURE — 3074F SYST BP LT 130 MM HG: CPT | Performed by: FAMILY MEDICINE

## 2023-10-24 PROCEDURE — 99214 OFFICE O/P EST MOD 30 MIN: CPT | Performed by: FAMILY MEDICINE

## 2023-10-24 RX ORDER — MUPIROCIN CALCIUM 20 MG/G
CREAM TOPICAL
Qty: 15 G | Refills: 0 | Status: SHIPPED | OUTPATIENT
Start: 2023-10-24 | End: 2023-11-23

## 2023-10-24 ASSESSMENT — ENCOUNTER SYMPTOMS
EYE DISCHARGE: 0
EYE REDNESS: 0
ABDOMINAL PAIN: 0
PHOTOPHOBIA: 0
SORE THROAT: 0
VOMITING: 0
BLOOD IN STOOL: 0
NAUSEA: 0
SHORTNESS OF BREATH: 0
FACIAL SWELLING: 1
EYE PAIN: 0
SINUS PAIN: 0
CHEST TIGHTNESS: 0
TROUBLE SWALLOWING: 0
ALLERGIC/IMMUNOLOGIC NEGATIVE: 1
BACK PAIN: 0
COUGH: 0
DIARRHEA: 0

## 2024-01-04 DIAGNOSIS — E78.2 MIXED HYPERLIPIDEMIA: ICD-10-CM

## 2024-01-04 RX ORDER — ROSUVASTATIN CALCIUM 20 MG/1
20 TABLET, COATED ORAL NIGHTLY
Qty: 30 TABLET | Refills: 0 | Status: SHIPPED | OUTPATIENT
Start: 2024-01-04

## 2024-01-04 RX ORDER — ROSUVASTATIN CALCIUM 20 MG/1
20 TABLET, COATED ORAL NIGHTLY
Qty: 90 TABLET | OUTPATIENT
Start: 2024-01-04

## 2024-01-04 NOTE — TELEPHONE ENCOUNTER
Last Appointment:  10/24/2023  Future Appointments   Date Time Provider Department Center   1/12/2024 10:20 AM [facility]

## 2024-01-12 ENCOUNTER — OFFICE VISIT (OUTPATIENT)
Dept: FAMILY MEDICINE CLINIC | Age: 84
End: 2024-01-12

## 2024-01-12 VITALS
DIASTOLIC BLOOD PRESSURE: 90 MMHG | TEMPERATURE: 98.7 F | RESPIRATION RATE: 16 BRPM | BODY MASS INDEX: 23.79 KG/M2 | WEIGHT: 118 LBS | HEIGHT: 59 IN | HEART RATE: 87 BPM | SYSTOLIC BLOOD PRESSURE: 130 MMHG | OXYGEN SATURATION: 98 %

## 2024-01-12 DIAGNOSIS — E78.2 MIXED HYPERLIPIDEMIA: ICD-10-CM

## 2024-01-12 DIAGNOSIS — N18.31 STAGE 3A CHRONIC KIDNEY DISEASE (HCC): ICD-10-CM

## 2024-01-12 DIAGNOSIS — N18.31 STAGE 3A CHRONIC KIDNEY DISEASE (HCC): Primary | ICD-10-CM

## 2024-01-12 DIAGNOSIS — G20.A1 PARKINSON'S DISEASE WITHOUT DYSKINESIA, UNSPECIFIED WHETHER MANIFESTATIONS FLUCTUATE: ICD-10-CM

## 2024-01-12 PROBLEM — N18.32 STAGE 3B CHRONIC KIDNEY DISEASE (HCC): Status: RESOLVED | Noted: 2022-01-05 | Resolved: 2024-01-12

## 2024-01-12 PROBLEM — R60.0 LOCALIZED EDEMA: Status: RESOLVED | Noted: 2022-01-05 | Resolved: 2024-01-12

## 2024-01-12 PROBLEM — N17.9 AKI (ACUTE KIDNEY INJURY) (HCC): Status: RESOLVED | Noted: 2021-12-02 | Resolved: 2024-01-12

## 2024-01-12 PROBLEM — R55 SYNCOPE AND COLLAPSE: Status: RESOLVED | Noted: 2021-12-02 | Resolved: 2024-01-12

## 2024-01-12 LAB
ANION GAP SERPL CALCULATED.3IONS-SCNC: 14 MMOL/L (ref 7–16)
BUN BLDV-MCNC: 19 MG/DL (ref 6–23)
CALCIUM SERPL-MCNC: 9 MG/DL (ref 8.6–10.2)
CHLORIDE BLD-SCNC: 103 MMOL/L (ref 98–107)
CO2: 22 MMOL/L (ref 22–29)
CREAT SERPL-MCNC: 1 MG/DL (ref 0.5–1)
GFR SERPL CREATININE-BSD FRML MDRD: 57 ML/MIN/1.73M2
GLUCOSE BLD-MCNC: 88 MG/DL (ref 74–99)
POTASSIUM SERPL-SCNC: 3.9 MMOL/L (ref 3.5–5)
SODIUM BLD-SCNC: 139 MMOL/L (ref 132–146)

## 2024-01-12 RX ORDER — ROSUVASTATIN CALCIUM 20 MG/1
20 TABLET, COATED ORAL NIGHTLY
Qty: 90 TABLET | Refills: 3 | Status: SHIPPED | OUTPATIENT
Start: 2024-01-12

## 2024-01-12 ASSESSMENT — PATIENT HEALTH QUESTIONNAIRE - PHQ9
SUM OF ALL RESPONSES TO PHQ QUESTIONS 1-9: 2
SUM OF ALL RESPONSES TO PHQ QUESTIONS 1-9: 2
2. FEELING DOWN, DEPRESSED OR HOPELESS: 1
SUM OF ALL RESPONSES TO PHQ9 QUESTIONS 1 & 2: 2
SUM OF ALL RESPONSES TO PHQ QUESTIONS 1-9: 2
1. LITTLE INTEREST OR PLEASURE IN DOING THINGS: 1
SUM OF ALL RESPONSES TO PHQ QUESTIONS 1-9: 2

## 2024-01-12 NOTE — PROGRESS NOTES
[facility] CARE  [address] [place] [id number]  Dept: [phone]  Dept Fax: [phone]   DATE OF VISIT : 2024      Patient:  Gianna Britt  Age: 83 y.o.       : 1940      Chief complaint:   Chief Complaint   Patient presents with    Established New Doctor         History of Present Illness     Gianna Britt is a 83 y.o. female who presented to the clinic today to establish care    Patient was previously seeing Dr. Rodriguez for Parkinson's, stage IIIa chronic kidney disease, hypertension, hyperlipidemia, and overactive bladder.  She reports following up with specialist for Parkinson's disease and continuing to be on Sinemet 25 100 mg, and Requip.  She denies any medication changes recently to her Parkinson medication.  And is tolerating them well.  No recent falls.  Reports attempting to discontinue nortriptyline and this past week due to wanting to see if she can tolerate discontinuing some of her medication.  Is following up closely with specialists for this.  She denies any worsening depressed mood at this time.  For CKD patient reports being unaware that she suffers from chronic kidney disease and reports having good urine output.  Denies any urinary symptoms at this time.  Cholesterol has been stable.  No recent changes to overall diet.      Medication List:    Current Outpatient Medications   Medication Sig Dispense Refill    rosuvastatin (CRESTOR) 20 MG tablet TAKE 1 TABLET BY MOUTH EVERY NIGHT 30 tablet 0    Handicap Placard MISC by Does not apply route 5 years 1 each 0    mirabegron (MYRBETRIQ) 25 MG TB24 Take 2 tablets by mouth daily      carbidopa-levodopa (SINEMET)  MG per tablet 2.5 @ 7am  2.5 @ 11am  2.5 @ 3pm  2 @ 7pm  0    ropinirole (REQUIP) 1 MG tablet Take 8 tablets by mouth every morning (before breakfast)      nortriptyline (PAMELOR) 10 MG capsule Take 1 capsule by mouth nightly       No current

## 2024-02-04 DIAGNOSIS — E78.2 MIXED HYPERLIPIDEMIA: ICD-10-CM

## 2024-02-05 RX ORDER — ROSUVASTATIN CALCIUM 20 MG/1
20 TABLET, COATED ORAL NIGHTLY
Qty: 30 TABLET | OUTPATIENT
Start: 2024-02-05

## 2024-04-17 ENCOUNTER — TELEPHONE (OUTPATIENT)
Dept: FAMILY MEDICINE CLINIC | Age: 84
End: 2024-04-17

## 2024-04-17 DIAGNOSIS — R39.9 URINARY TRACT INFECTION SYMPTOMS: Primary | ICD-10-CM

## 2024-04-17 NOTE — TELEPHONE ENCOUNTER
Daughter, Virginia, calling in.  Patient resides at [facility].  Daughter stated patient is having urinary symptoms.  Patient is experiencing burning with urination and frequency.  [facility] can send out specimen if you are willing to place the order.  Please advise.    Please fax order to [facility]  Fax#[phone]  Please call [facility] with Dr. Reynolds's message.  Phone# [phone]

## 2024-04-18 DIAGNOSIS — R39.9 URINARY TRACT INFECTION SYMPTOMS: ICD-10-CM

## 2024-04-18 LAB
BILIRUBIN URINE: NEGATIVE
COLOR: YELLOW
EPITHELIAL CELLS UA: ABNORMAL /HPF
GLUCOSE URINE: 100 MG/DL
KETONES, URINE: NEGATIVE MG/DL
LEUKOCYTE ESTERASE, URINE: ABNORMAL
NITRITE, URINE: NEGATIVE
PH UA: 6.5 (ref 5–9)
PROTEIN UA: 30 MG/DL
RBC UA: ABNORMAL /HPF
SPECIFIC GRAVITY UA: 1.01 (ref 1–1.03)
TURBIDITY: CLEAR
URINE HGB: NEGATIVE
UROBILINOGEN, URINE: 0.2 EU/DL (ref 0–1)
WBC UA: ABNORMAL /HPF

## 2024-04-18 NOTE — TELEPHONE ENCOUNTER
Orders faxed to the [facility] to the number provided.  Daughter, Virginia, notified.

## 2024-04-18 NOTE — TELEPHONE ENCOUNTER
Daughter calling in this morning to check status of order.  Can you please place order?  Orders pending.  [facility] will collect specimen and send to [facility].

## 2024-04-20 LAB
CULTURE: NO GROWTH
SPECIMEN DESCRIPTION: NORMAL

## 2024-05-18 ENCOUNTER — HOSPITAL ENCOUNTER (EMERGENCY)
Age: 84
Discharge: HOME | End: 2024-05-18
Payer: COMMERCIAL

## 2024-05-18 ENCOUNTER — APPOINTMENT (OUTPATIENT)
Dept: ULTRASOUND IMAGING | Age: 84
End: 2024-05-18
Payer: COMMERCIAL

## 2024-05-18 ENCOUNTER — APPOINTMENT (OUTPATIENT)
Dept: GENERAL RADIOLOGY | Age: 84
End: 2024-05-18
Payer: COMMERCIAL

## 2024-05-18 VITALS
HEART RATE: 74 BPM | RESPIRATION RATE: 16 BRPM | OXYGEN SATURATION: 96 % | TEMPERATURE: 97.8 F | SYSTOLIC BLOOD PRESSURE: 114 MMHG | DIASTOLIC BLOOD PRESSURE: 66 MMHG

## 2024-05-18 DIAGNOSIS — S80.01XA HEMATOMA OF RIGHT KNEE REGION: Primary | ICD-10-CM

## 2024-05-18 PROCEDURE — 93971 EXTREMITY STUDY: CPT

## 2024-05-18 PROCEDURE — 73562 X-RAY EXAM OF KNEE 3: CPT

## 2024-05-18 PROCEDURE — 99284 EMERGENCY DEPT VISIT MOD MDM: CPT

## 2024-05-18 NOTE — ED PROVIDER NOTES
Independent ELIF Visit.     [facility]  Department of Emergency Medicine   ED  Encounter Note  Admit Date/RoomTime: 2024  9:39 PM  ED Room: [place] [address]    NAME: Gianna Britt  : 1940  MRN: [id number]     Chief Complaint:  Joint Swelling (R knee swelling. Pt denies any injury. Pt states she noticed the swelling 3 hours ago. -thinners )    History of Present Illness       Gianna Britt is a 83 y.o. old female with a history of Parkinson's disease, high cholesterol, kidney disease who presents to the emergency department by private vehicle, for non-traumatic right knee swelling that started around 4pm today.  Since, she said the swelling has been moving up her leg.    She had a right knee replacement over 10 years ago.  She took Tylenol when she first started noticing the pain. She is not on any blood thinning medications.  She is able to ambulate and said the pain is currently better than it was earlier this afternoon.   She denies fevers, chills, nausea, vomiting, chest pain, shortness of breath. She is here with her family and they said she was able to ambulate into the ER with her walker which is her baseline.   ROS   Pertinent positives and negatives are stated within HPI, all other systems reviewed and are negative.    Past Medical History:  has a past medical history of Degenerative joint disease involving multiple joints, Hyperlipidemia, Hypertension, Parkinson disease (HCC), and Skin cancer.    Surgical History:  has a past surgical history that includes knee surgery (left) and Tonsillectomy and adenoidectomy.    Social History:  reports that she has never smoked. She has never used smokeless tobacco. She reports that she does not currently use alcohol. She reports that she does not use drugs.    Family History: family history includes No Known Problems in her father and mother.     Allergies: Patient has no known allergies.    Physical Exam   Oxygen Saturation Interpretation:  overlying the medial upper knee.   Infection is not excluded.           ED Course / Medical Decision Making   Medications - No data to display  ED Course as of 05/18/24 2153   Sat May 18, 2024   2148 Discussed today's findings with patient.  Applied an ACE wrap for compression.  Discussed home care, follow up and return precautions.  [JL]      ED Course User Index  [JL] Les Tello, APRN - CNP     Consult(s):   None    Procedure(s):   none.    MDM:       83 y.o. old female with a history of Parkinson's disease, high cholesterol, kidney disease who presents to the emergency department by private vehicle, for non-traumatic right knee swelling that started around 4pm today.  Since, she said the swelling has been moving up her leg.    She had a right knee replacement over 10 years ago.  She took Tylenol when she first started noticing the pain. She is not on any blood thinning medications.  She is able to ambulate and said the pain is currently better than it was earlier this afternoon.   She denies fevers, chills, nausea, vomiting, chest pain, shortness of breath. She is here with her family and they said she was able to ambulate into the ER with her walker which is her baseline.     Ddx but not limited to:  contusion, joint effusion, DVT, prosthesis malfunction    Examination:  All vital signs normal.  She is awake, alert, oriented, non-toxic appearing, no distress. Skin warm, pink, dry.  Respirations regular, non-labored, no tachypnea.  There is his right medial knee tenderness, mild in severity.  There is a moderate swelling, no deformity, full range of motion.  There is no wounds, erythema or warmth.  There is no pain out of proportion with movement of her right knee.  She has no calf or ankle tenderness.  She has a palpable right PT pulse.  Family states that she was ambulating in today with the use of her walker which is her baseline.  XR of the right knee per radiologist revealed a total knee arthroplasty with no

## 2024-05-19 NOTE — DISCHARGE INSTRUCTIONS
KEEP ACE WRAP ON FOR COMPRESSION AND COMFORT.  KEEP RIGHT LEG ELEVATED TO HELP WITH SWELLING.  APPLY HEAT FOR 20 MINUTES AT A TIME.  FOLLOW UP WITH YOUR PRIMARY DOCTOR AND ORTHO ON MONDAY.  RETURN FOR WORSENING SYMPTOMS.

## 2025-01-29 DIAGNOSIS — E78.2 MIXED HYPERLIPIDEMIA: ICD-10-CM

## 2025-01-29 RX ORDER — ROSUVASTATIN CALCIUM 20 MG/1
20 TABLET, COATED ORAL NIGHTLY
Qty: 90 TABLET | Refills: 1 | Status: SHIPPED | OUTPATIENT
Start: 2025-01-29

## 2025-01-29 NOTE — TELEPHONE ENCOUNTER
Gianna needs a new script for Rosuvastatin sent to [facility] in [place].       I have this ready to send.       Last Appointment:  1/12/2024  No future appointments.

## 2025-02-04 ENCOUNTER — HOSPITAL ENCOUNTER (OUTPATIENT)
Age: 85
Setting detail: OBSERVATION
Discharge: NURSING, REHAB OR LONG-TERM CARE | End: 2025-02-07
Attending: STUDENT IN AN ORGANIZED HEALTH CARE EDUCATION/TRAINING PROGRAM | Admitting: INTERNAL MEDICINE
Payer: MEDICARE

## 2025-02-04 ENCOUNTER — APPOINTMENT (OUTPATIENT)
Dept: CT IMAGING | Age: 85
End: 2025-02-04
Payer: MEDICARE

## 2025-02-04 ENCOUNTER — APPOINTMENT (OUTPATIENT)
Dept: ULTRASOUND IMAGING | Age: 85
End: 2025-02-04
Payer: MEDICARE

## 2025-02-04 ENCOUNTER — APPOINTMENT (OUTPATIENT)
Dept: GENERAL RADIOLOGY | Age: 85
End: 2025-02-04
Payer: MEDICARE
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
## 2025-02-04 DIAGNOSIS — S51.812A SKIN TEAR OF LEFT FOREARM WITHOUT COMPLICATION, INITIAL ENCOUNTER: ICD-10-CM

## 2025-02-04 DIAGNOSIS — L03.116 CELLULITIS OF LEFT LOWER LEG: ICD-10-CM

## 2025-02-04 DIAGNOSIS — J18.9 PNEUMONIA OF BOTH LUNGS DUE TO INFECTIOUS ORGANISM, UNSPECIFIED PART OF LUNG: Primary | ICD-10-CM

## 2025-02-04 LAB
ALBUMIN SERPL-MCNC: 3.7 G/DL (ref 3.5–5.2)
ALP SERPL-CCNC: 59 U/L (ref 35–104)
ALT SERPL-CCNC: <5 U/L (ref 0–32)
ANION GAP SERPL CALCULATED.3IONS-SCNC: 12 MMOL/L (ref 7–16)
AST SERPL-CCNC: 22 U/L (ref 0–31)
BASOPHILS # BLD: 0.05 K/UL (ref 0–0.2)
BASOPHILS NFR BLD: 1 % (ref 0–2)
BILIRUB SERPL-MCNC: 0.6 MG/DL (ref 0–1.2)
BILIRUB UR QL STRIP: NEGATIVE
BNP SERPL-MCNC: 1493 PG/ML (ref 0–450)
BUN SERPL-MCNC: 21 MG/DL (ref 6–23)
CALCIUM SERPL-MCNC: 8.6 MG/DL (ref 8.6–10.2)
CHLORIDE SERPL-SCNC: 101 MMOL/L (ref 98–107)
CLARITY UR: CLEAR
CO2 SERPL-SCNC: 23 MMOL/L (ref 22–29)
COLOR UR: YELLOW
CREAT SERPL-MCNC: 0.9 MG/DL (ref 0.5–1)
CRP SERPL HS-MCNC: 13 MG/L (ref 0–5)
EOSINOPHIL # BLD: 0.07 K/UL (ref 0.05–0.5)
EOSINOPHILS RELATIVE PERCENT: 1 % (ref 0–6)
ERYTHROCYTE [DISTWIDTH] IN BLOOD BY AUTOMATED COUNT: 12.9 % (ref 11.5–15)
ERYTHROCYTE [SEDIMENTATION RATE] IN BLOOD BY WESTERGREN METHOD: 25 MM/HR (ref 0–20)
GFR, ESTIMATED: 65 ML/MIN/1.73M2
GLUCOSE SERPL-MCNC: 73 MG/DL (ref 74–99)
GLUCOSE UR STRIP-MCNC: NEGATIVE MG/DL
HCT VFR BLD AUTO: 41.3 % (ref 34–48)
HGB BLD-MCNC: 13.2 G/DL (ref 11.5–15.5)
HGB UR QL STRIP.AUTO: ABNORMAL
IMM GRANULOCYTES # BLD AUTO: 0.03 K/UL (ref 0–0.58)
IMM GRANULOCYTES NFR BLD: 1 % (ref 0–5)
INFLUENZA A BY PCR: NOT DETECTED
INFLUENZA B BY PCR: NOT DETECTED
KETONES UR STRIP-MCNC: NEGATIVE MG/DL
LACTATE BLDV-SCNC: 1.1 MMOL/L (ref 0.5–1.9)
LEUKOCYTE ESTERASE UR QL STRIP: NEGATIVE
LYMPHOCYTES NFR BLD: 0.21 K/UL (ref 1.5–4)
LYMPHOCYTES RELATIVE PERCENT: 3 % (ref 20–42)
MCH RBC QN AUTO: 32.4 PG (ref 26–35)
MCHC RBC AUTO-ENTMCNC: 32 G/DL (ref 32–34.5)
MCV RBC AUTO: 101.2 FL (ref 80–99.9)
MONOCYTES NFR BLD: 0.88 K/UL (ref 0.1–0.95)
MONOCYTES NFR BLD: 14 % (ref 2–12)
NEUTROPHILS NFR BLD: 80 % (ref 43–80)
NEUTS SEG NFR BLD: 4.87 K/UL (ref 1.8–7.3)
NITRITE UR QL STRIP: NEGATIVE
PH UR STRIP: 6 [PH] (ref 5–8)
PLATELET # BLD AUTO: 273 K/UL (ref 130–450)
PMV BLD AUTO: 9.6 FL (ref 7–12)
POTASSIUM SERPL-SCNC: 4.2 MMOL/L (ref 3.5–5)
PROCALCITONIN SERPL-MCNC: 0.17 NG/ML (ref 0–0.08)
PROT SERPL-MCNC: 6.8 G/DL (ref 6.4–8.3)
PROT UR STRIP-MCNC: NEGATIVE MG/DL
RBC # BLD AUTO: 4.08 M/UL (ref 3.5–5.5)
RBC # BLD: ABNORMAL 10*6/UL
RBC #/AREA URNS HPF: ABNORMAL /HPF
SARS-COV-2 RDRP RESP QL NAA+PROBE: NOT DETECTED
SODIUM SERPL-SCNC: 136 MMOL/L (ref 132–146)
SP GR UR STRIP: 1.01 (ref 1–1.03)
SPECIMEN DESCRIPTION: NORMAL
TROPONIN I SERPL HS-MCNC: 30 NG/L (ref 0–9)
TROPONIN I SERPL HS-MCNC: 34 NG/L (ref 0–9)
UROBILINOGEN UR STRIP-ACNC: 0.2 EU/DL (ref 0–1)
WBC #/AREA URNS HPF: ABNORMAL /HPF
WBC OTHER # BLD: 6.1 K/UL (ref 4.5–11.5)

## 2025-02-04 PROCEDURE — 96375 TX/PRO/DX INJ NEW DRUG ADDON: CPT

## 2025-02-04 PROCEDURE — 93005 ELECTROCARDIOGRAM TRACING: CPT

## 2025-02-04 PROCEDURE — 80053 COMPREHEN METABOLIC PANEL: CPT

## 2025-02-04 PROCEDURE — 6370000000 HC RX 637 (ALT 250 FOR IP)

## 2025-02-04 PROCEDURE — 87635 SARS-COV-2 COVID-19 AMP PRB: CPT

## 2025-02-04 PROCEDURE — 87449 NOS EACH ORGANISM AG IA: CPT

## 2025-02-04 PROCEDURE — 96361 HYDRATE IV INFUSION ADD-ON: CPT

## 2025-02-04 PROCEDURE — 90714 TD VACC NO PRESV 7 YRS+ IM: CPT

## 2025-02-04 PROCEDURE — G0378 HOSPITAL OBSERVATION PER HR: HCPCS

## 2025-02-04 PROCEDURE — 87088 URINE BACTERIA CULTURE: CPT

## 2025-02-04 PROCEDURE — 0202U NFCT DS 22 TRGT SARS-COV-2: CPT

## 2025-02-04 PROCEDURE — 72125 CT NECK SPINE W/O DYE: CPT

## 2025-02-04 PROCEDURE — 87075 CULTR BACTERIA EXCEPT BLOOD: CPT

## 2025-02-04 PROCEDURE — 87040 BLOOD CULTURE FOR BACTERIA: CPT

## 2025-02-04 PROCEDURE — 96365 THER/PROPH/DIAG IV INF INIT: CPT

## 2025-02-04 PROCEDURE — 87205 SMEAR GRAM STAIN: CPT

## 2025-02-04 PROCEDURE — 6360000002 HC RX W HCPCS

## 2025-02-04 PROCEDURE — 2500000003 HC RX 250 WO HCPCS: Performed by: INTERNAL MEDICINE

## 2025-02-04 PROCEDURE — 86140 C-REACTIVE PROTEIN: CPT

## 2025-02-04 PROCEDURE — 81001 URINALYSIS AUTO W/SCOPE: CPT

## 2025-02-04 PROCEDURE — 85025 COMPLETE CBC W/AUTO DIFF WBC: CPT

## 2025-02-04 PROCEDURE — 99223 1ST HOSP IP/OBS HIGH 75: CPT | Performed by: INTERNAL MEDICINE

## 2025-02-04 PROCEDURE — 90471 IMMUNIZATION ADMIN: CPT

## 2025-02-04 PROCEDURE — 2580000003 HC RX 258

## 2025-02-04 PROCEDURE — 96374 THER/PROPH/DIAG INJ IV PUSH: CPT

## 2025-02-04 PROCEDURE — 6370000000 HC RX 637 (ALT 250 FOR IP): Performed by: INTERNAL MEDICINE

## 2025-02-04 PROCEDURE — 87899 AGENT NOS ASSAY W/OPTIC: CPT

## 2025-02-04 PROCEDURE — 84145 PROCALCITONIN (PCT): CPT

## 2025-02-04 PROCEDURE — 2500000003 HC RX 250 WO HCPCS

## 2025-02-04 PROCEDURE — 71045 X-RAY EXAM CHEST 1 VIEW: CPT

## 2025-02-04 PROCEDURE — 83605 ASSAY OF LACTIC ACID: CPT

## 2025-02-04 PROCEDURE — 73590 X-RAY EXAM OF LOWER LEG: CPT

## 2025-02-04 PROCEDURE — 87502 INFLUENZA DNA AMP PROBE: CPT

## 2025-02-04 PROCEDURE — 87070 CULTURE OTHR SPECIMN AEROBIC: CPT

## 2025-02-04 PROCEDURE — 87086 URINE CULTURE/COLONY COUNT: CPT

## 2025-02-04 PROCEDURE — 70450 CT HEAD/BRAIN W/O DYE: CPT

## 2025-02-04 PROCEDURE — 84484 ASSAY OF TROPONIN QUANT: CPT

## 2025-02-04 PROCEDURE — 93971 EXTREMITY STUDY: CPT

## 2025-02-04 PROCEDURE — 99285 EMERGENCY DEPT VISIT HI MDM: CPT

## 2025-02-04 PROCEDURE — 85652 RBC SED RATE AUTOMATED: CPT

## 2025-02-04 PROCEDURE — 83880 ASSAY OF NATRIURETIC PEPTIDE: CPT

## 2025-02-04 PROCEDURE — 87185 SC STD ENZYME DETCJ PER NZM: CPT

## 2025-02-04 PROCEDURE — 86403 PARTICLE AGGLUT ANTBDY SCRN: CPT

## 2025-02-04 RX ORDER — 0.9 % SODIUM CHLORIDE 0.9 %
500 INTRAVENOUS SOLUTION INTRAVENOUS ONCE
Status: COMPLETED | OUTPATIENT
Start: 2025-02-04 | End: 2025-02-04

## 2025-02-04 RX ORDER — CARBIDOPA AND LEVODOPA 25; 100 MG/1; MG/1
1 TABLET ORAL 4 TIMES DAILY
Status: DISCONTINUED | OUTPATIENT
Start: 2025-02-04 | End: 2025-02-07 | Stop reason: HOSPADM

## 2025-02-04 RX ORDER — POLYETHYLENE GLYCOL 3350 17 G/17G
17 POWDER, FOR SOLUTION ORAL DAILY PRN
Status: DISCONTINUED | OUTPATIENT
Start: 2025-02-04 | End: 2025-02-07 | Stop reason: HOSPADM

## 2025-02-04 RX ORDER — SODIUM CHLORIDE 9 MG/ML
INJECTION, SOLUTION INTRAVENOUS PRN
Status: DISCONTINUED | OUTPATIENT
Start: 2025-02-04 | End: 2025-02-07 | Stop reason: HOSPADM

## 2025-02-04 RX ORDER — 0.9 % SODIUM CHLORIDE 0.9 %
1000 INTRAVENOUS SOLUTION INTRAVENOUS ONCE
Status: COMPLETED | OUTPATIENT
Start: 2025-02-04 | End: 2025-02-04

## 2025-02-04 RX ORDER — ACETAMINOPHEN 650 MG/1
650 SUPPOSITORY RECTAL EVERY 6 HOURS PRN
Status: DISCONTINUED | OUTPATIENT
Start: 2025-02-04 | End: 2025-02-07 | Stop reason: HOSPADM

## 2025-02-04 RX ORDER — CARBIDOPA AND LEVODOPA 25; 100 MG/1; MG/1
2.5 TABLET ORAL ONCE
Status: COMPLETED | OUTPATIENT
Start: 2025-02-04 | End: 2025-02-04

## 2025-02-04 RX ORDER — SODIUM CHLORIDE 0.9 % (FLUSH) 0.9 %
5-40 SYRINGE (ML) INJECTION EVERY 12 HOURS SCHEDULED
Status: DISCONTINUED | OUTPATIENT
Start: 2025-02-04 | End: 2025-02-07 | Stop reason: HOSPADM

## 2025-02-04 RX ORDER — ROPINIROLE 2 MG/1
8 TABLET, FILM COATED ORAL
Status: DISCONTINUED | OUTPATIENT
Start: 2025-02-05 | End: 2025-02-07 | Stop reason: HOSPADM

## 2025-02-04 RX ORDER — CARBIDOPA/LEVODOPA 25MG-250MG
1 TABLET ORAL 4 TIMES DAILY
COMMUNITY
Start: 2025-01-02

## 2025-02-04 RX ORDER — ENOXAPARIN SODIUM 100 MG/ML
30 INJECTION SUBCUTANEOUS DAILY
Status: DISCONTINUED | OUTPATIENT
Start: 2025-02-04 | End: 2025-02-07 | Stop reason: HOSPADM

## 2025-02-04 RX ORDER — ACETAMINOPHEN 325 MG/1
650 TABLET ORAL EVERY 6 HOURS PRN
Status: DISCONTINUED | OUTPATIENT
Start: 2025-02-04 | End: 2025-02-07 | Stop reason: HOSPADM

## 2025-02-04 RX ORDER — ROSUVASTATIN CALCIUM 20 MG/1
20 TABLET, COATED ORAL NIGHTLY
Status: DISCONTINUED | OUTPATIENT
Start: 2025-02-04 | End: 2025-02-07 | Stop reason: HOSPADM

## 2025-02-04 RX ORDER — ACETAMINOPHEN 325 MG/1
650 TABLET ORAL ONCE
Status: COMPLETED | OUTPATIENT
Start: 2025-02-04 | End: 2025-02-04

## 2025-02-04 RX ORDER — ONDANSETRON 2 MG/ML
4 INJECTION INTRAMUSCULAR; INTRAVENOUS EVERY 6 HOURS PRN
Status: DISCONTINUED | OUTPATIENT
Start: 2025-02-04 | End: 2025-02-07 | Stop reason: HOSPADM

## 2025-02-04 RX ORDER — ONDANSETRON 4 MG/1
4 TABLET, ORALLY DISINTEGRATING ORAL EVERY 8 HOURS PRN
Status: DISCONTINUED | OUTPATIENT
Start: 2025-02-04 | End: 2025-02-07 | Stop reason: HOSPADM

## 2025-02-04 RX ORDER — SODIUM CHLORIDE 0.9 % (FLUSH) 0.9 %
5-40 SYRINGE (ML) INJECTION PRN
Status: DISCONTINUED | OUTPATIENT
Start: 2025-02-04 | End: 2025-02-07 | Stop reason: HOSPADM

## 2025-02-04 RX ORDER — BACITRACIN ZINC 500 [USP'U]/G
OINTMENT TOPICAL ONCE
Status: COMPLETED | OUTPATIENT
Start: 2025-02-04 | End: 2025-02-04

## 2025-02-04 RX ORDER — TROSPIUM CHLORIDE 20 MG/1
20 TABLET, FILM COATED ORAL
Status: DISCONTINUED | OUTPATIENT
Start: 2025-02-04 | End: 2025-02-07 | Stop reason: HOSPADM

## 2025-02-04 RX ADMIN — SODIUM CHLORIDE, PRESERVATIVE FREE 10 ML: 5 INJECTION INTRAVENOUS at 21:31

## 2025-02-04 RX ADMIN — ACETAMINOPHEN 650 MG: 325 TABLET ORAL at 14:08

## 2025-02-04 RX ADMIN — CARBIDOPA AND LEVODOPA 1 TABLET: 25; 100 TABLET ORAL at 21:30

## 2025-02-04 RX ADMIN — DOXYCYCLINE 100 MG: 100 INJECTION, POWDER, LYOPHILIZED, FOR SOLUTION INTRAVENOUS at 17:19

## 2025-02-04 RX ADMIN — ROSUVASTATIN CALCIUM 20 MG: 20 TABLET, FILM COATED ORAL at 21:30

## 2025-02-04 RX ADMIN — BACITRACIN ZINC: 500 OINTMENT TOPICAL at 17:13

## 2025-02-04 RX ADMIN — SODIUM CHLORIDE 1000 ML: 9 INJECTION, SOLUTION INTRAVENOUS at 14:08

## 2025-02-04 RX ADMIN — WATER 2000 MG: 1 INJECTION INTRAMUSCULAR; INTRAVENOUS; SUBCUTANEOUS at 17:13

## 2025-02-04 RX ADMIN — CARBIDOPA AND LEVODOPA 2.5 TABLET: 25; 100 TABLET ORAL at 17:13

## 2025-02-04 RX ADMIN — CLOSTRIDIUM TETANI TOXOID ANTIGEN (FORMALDEHYDE INACTIVATED) AND CORYNEBACTERIUM DIPHTHERIAE TOXOID ANTIGEN (FORMALDEHYDE INACTIVATED) 0.5 ML: 5; 2 INJECTION, SUSPENSION INTRAMUSCULAR at 17:14

## 2025-02-04 RX ADMIN — SODIUM CHLORIDE 500 ML: 9 INJECTION, SOLUTION INTRAVENOUS at 14:13

## 2025-02-04 ASSESSMENT — PAIN SCALES - GENERAL: PAINLEVEL_OUTOF10: 8

## 2025-02-04 NOTE — ED PROVIDER NOTES
[facility] EMERGENCY DEPARTMENT  EMERGENCY DEPARTMENT ENCOUNTER        Pt Name: Gianna Britt  MRN: [id number]  Birthdate 1940  Date of evaluation: 2/4/2025  Provider: Kelsey Nazario MD  PCP: Henri Reynolds MD  Note Started: 1:41 PM EST 2/4/25    CHIEF COMPLAINT       Chief Complaint   Patient presents with    Fall     Pt fell off toilet no loc and has a wound to left lower leg. Leg warm red and swollen       HISTORY OF PRESENT ILLNESS: 1 or more Elements   History From: Patient    Limitations to history : None    Gianna Britt is a 84 y.o. female with a history for Parkinson's who presents from home for mechanical fall off her toilet.  She states the toilet roll fell into the shower and the chair came out from under her and she lost her balance and fell forward.  She denies head trauma or loss of consciousness.  She is on Eliquis.  In addition the patient does endorse cough and runny nose.  Denies GI symptoms such as abdominal pain, vomiting, diarrhea.  She has not noted a fever at home but was found to be febrile to 103.1 in the ED.     Nursing Notes were all reviewed and agreed with or any disagreements were addressed in the HPI.        REVIEW OF EXTERNAL NOTE :       Patient was last seen on 5/18/2024 in the ED for hematoma of right knee region    She was seen by neurology on 11/13/2024 for Parkinson's disease, gait difficulty, at risk for falling    REVIEW OF SYSTEMS :           Positives and Pertinent negatives as per HPI.     SURGICAL HISTORY     Past Surgical History:   Procedure Laterality Date    KNEE SURGERY  left    TONSILLECTOMY AND ADENOIDECTOMY         CURRENTMEDICATIONS       Previous Medications    CARBIDOPA-LEVODOPA (SINEMET)  MG PER TABLET    2.5 @ 7am  2.5 @ 11am  2.5 @ 3pm  2 @ 7pm    HANDICAP PLACARD MISC    by Does not apply route 5 years    MIRABEGRON (MYRBETRIQ) 25 MG TB24    Take 2 tablets by mouth daily    NORTRIPTYLINE (PAMELOR) 10 MG CAPSULE

## 2025-02-04 NOTE — H&P
[facility]ist [facility] History and Physical      CHIEF COMPLAINT:  fall in bathroom     History of Present Illness:      This is an 84 year old female with past medical history of HLD, HTN, Parkinsons, degenerative joint disease who presents to ER with complaints of fall in bathroom off toilet. She had some skin abrasions to left leg with redness and edema. Patient denies loss of consciousness. Febrile on arrival. Lab workup reassuring. CXR showing cardiomegaly with pulmonary vascular congestion and atelectasis. She was given 1.5 L bolus, Sinemet, Ceftriaxone, Doxycycline, Tetanus shot and Tylenol in Er. Decision to admit.     Informant(s) for H&P: epic     REVIEW OF SYSTEMS:  A comprehensive review of systems was negative except for: what is in the HPI    PMH:  Past Medical History:   Diagnosis Date    Degenerative joint disease involving multiple joints     Hyperlipidemia     Hypertension     Parkinson disease (HCC)     Skin cancer     scc     Surgical History:  Past Surgical History:   Procedure Laterality Date    KNEE SURGERY  left    TONSILLECTOMY AND ADENOIDECTOMY       Medications Prior to Admission:    Prior to Admission medications    Medication Sig Start Date End Date Taking? Authorizing Provider   rosuvastatin (CRESTOR) 20 MG tablet Take 1 tablet by mouth nightly 1/29/25   Henri Reynolds MD   Handicap Placard MISC by Does not apply route 5 years 10/24/23   Lamine Rodriguez, DO   mirabegron (MYRBETRIQ) 25 MG TB24 Take 2 tablets by mouth daily 12/15/21   Matt Parmar MD   carbidopa-levodopa (SINEMET)  MG per tablet 2.5 @ 7am  2.5 @ 11am  2.5 @ 3pm  2 @ 7pm 4/26/19   Matt Parmar MD   ropinirole (REQUIP) 1 MG tablet Take 8 tablets by mouth every morning (before breakfast)    Matt Parmar MD   nortriptyline (PAMELOR) 10 MG capsule Take 1 capsule by mouth nightly    Matt Parmar MD     Allergies:    Patient has no known allergies.  Social History:

## 2025-02-04 NOTE — PROGRESS NOTES
Database initiated pharmacy and medications verified with the patient. She is A&O comes in from independent living. She ambulates with a walker and has fallen recently.  She is hard of hearing.

## 2025-02-04 NOTE — ED NOTES
ED to Inpatient Handoff Report    Notified Yessi that electronic handoff available and patient ready for transport to room 519.    Safety Risks: None identified    Patient in Restraints: no    Constant Observer or Patient : no    Telemetry Monitoring Ordered: No     Cardiac Rhythm: Sinus rhythm    Order to transfer to unit without monitor: NA    Last MEWS:  Time completed:          Vitals:    02/04/25 1321 02/04/25 1613 02/04/25 1644   BP: 122/79 (!) 104/58    Pulse: 87 85    Resp: 18 28    Temp: (!) 103.1 °F (39.5 °C)  99.6 °F (37.6 °C)   TempSrc: Oral  Oral   SpO2: 98% 95%    Weight: 49.4 kg (109 lb)     Height: 1.422 m (4' 8\")         Opportunity for questions and clarification was provided.

## 2025-02-05 LAB
ALBUMIN SERPL-MCNC: 3.3 G/DL (ref 3.5–5.2)
ALP SERPL-CCNC: 47 U/L (ref 35–104)
ALT SERPL-CCNC: <5 U/L (ref 0–32)
ANION GAP SERPL CALCULATED.3IONS-SCNC: 13 MMOL/L (ref 7–16)
AST SERPL-CCNC: 32 U/L (ref 0–31)
B PARAP IS1001 DNA NPH QL NAA+NON-PROBE: NOT DETECTED
B PERT DNA SPEC QL NAA+PROBE: NOT DETECTED
BASOPHILS # BLD: 0.02 K/UL (ref 0–0.2)
BASOPHILS NFR BLD: 0 % (ref 0–2)
BILIRUB SERPL-MCNC: 0.3 MG/DL (ref 0–1.2)
BUN SERPL-MCNC: 20 MG/DL (ref 6–23)
C PNEUM DNA NPH QL NAA+NON-PROBE: NOT DETECTED
CALCIUM SERPL-MCNC: 7.9 MG/DL (ref 8.6–10.2)
CHLORIDE SERPL-SCNC: 103 MMOL/L (ref 98–107)
CO2 SERPL-SCNC: 19 MMOL/L (ref 22–29)
CREAT SERPL-MCNC: 1.1 MG/DL (ref 0.5–1)
EOSINOPHIL # BLD: 0.01 K/UL (ref 0.05–0.5)
EOSINOPHILS RELATIVE PERCENT: 0 % (ref 0–6)
ERYTHROCYTE [DISTWIDTH] IN BLOOD BY AUTOMATED COUNT: 13 % (ref 11.5–15)
FLUAV H3 RNA NPH QL NAA+NON-PROBE: DETECTED
FLUAV RNA NPH QL NAA+NON-PROBE: DETECTED
FLUBV RNA NPH QL NAA+NON-PROBE: NOT DETECTED
GFR, ESTIMATED: 52 ML/MIN/1.73M2
GLUCOSE SERPL-MCNC: 78 MG/DL (ref 74–99)
HADV DNA NPH QL NAA+NON-PROBE: NOT DETECTED
HCOV 229E RNA NPH QL NAA+NON-PROBE: NOT DETECTED
HCOV HKU1 RNA NPH QL NAA+NON-PROBE: NOT DETECTED
HCOV NL63 RNA NPH QL NAA+NON-PROBE: NOT DETECTED
HCOV OC43 RNA NPH QL NAA+NON-PROBE: NOT DETECTED
HCT VFR BLD AUTO: 36.9 % (ref 34–48)
HGB BLD-MCNC: 11.5 G/DL (ref 11.5–15.5)
HMPV RNA NPH QL NAA+NON-PROBE: NOT DETECTED
HPIV1 RNA NPH QL NAA+NON-PROBE: NOT DETECTED
HPIV2 RNA NPH QL NAA+NON-PROBE: NOT DETECTED
HPIV3 RNA NPH QL NAA+NON-PROBE: NOT DETECTED
HPIV4 RNA NPH QL NAA+NON-PROBE: NOT DETECTED
IMM GRANULOCYTES # BLD AUTO: <0.03 K/UL (ref 0–0.58)
IMM GRANULOCYTES NFR BLD: 0 % (ref 0–5)
L PNEUMO1 AG UR QL IA.RAPID: NEGATIVE
LYMPHOCYTES NFR BLD: 0.42 K/UL (ref 1.5–4)
LYMPHOCYTES RELATIVE PERCENT: 9 % (ref 20–42)
M PNEUMO DNA NPH QL NAA+NON-PROBE: NOT DETECTED
MCH RBC QN AUTO: 31.8 PG (ref 26–35)
MCHC RBC AUTO-ENTMCNC: 31.2 G/DL (ref 32–34.5)
MCV RBC AUTO: 101.9 FL (ref 80–99.9)
MONOCYTES NFR BLD: 0.84 K/UL (ref 0.1–0.95)
MONOCYTES NFR BLD: 19 % (ref 2–12)
NEUTROPHILS NFR BLD: 71 % (ref 43–80)
NEUTS SEG NFR BLD: 3.15 K/UL (ref 1.8–7.3)
PLATELET # BLD AUTO: 231 K/UL (ref 130–450)
PMV BLD AUTO: 10.1 FL (ref 7–12)
POTASSIUM SERPL-SCNC: 3.8 MMOL/L (ref 3.5–5)
PROT SERPL-MCNC: 5.9 G/DL (ref 6.4–8.3)
RBC # BLD AUTO: 3.62 M/UL (ref 3.5–5.5)
RBC # BLD: ABNORMAL 10*6/UL
RSV RNA NPH QL NAA+NON-PROBE: NOT DETECTED
RV+EV RNA NPH QL NAA+NON-PROBE: NOT DETECTED
S PNEUM AG SPEC QL: NEGATIVE
SARS-COV-2 RNA NPH QL NAA+NON-PROBE: NOT DETECTED
SODIUM SERPL-SCNC: 135 MMOL/L (ref 132–146)
SPECIMEN DESCRIPTION: ABNORMAL
SPECIMEN SOURCE: NORMAL
WBC OTHER # BLD: 4.5 K/UL (ref 4.5–11.5)

## 2025-02-05 PROCEDURE — 36415 COLL VENOUS BLD VENIPUNCTURE: CPT

## 2025-02-05 PROCEDURE — 6370000000 HC RX 637 (ALT 250 FOR IP): Performed by: STUDENT IN AN ORGANIZED HEALTH CARE EDUCATION/TRAINING PROGRAM

## 2025-02-05 PROCEDURE — 2500000003 HC RX 250 WO HCPCS: Performed by: INTERNAL MEDICINE

## 2025-02-05 PROCEDURE — 85025 COMPLETE CBC W/AUTO DIFF WBC: CPT

## 2025-02-05 PROCEDURE — 97165 OT EVAL LOW COMPLEX 30 MIN: CPT

## 2025-02-05 PROCEDURE — 99232 SBSQ HOSP IP/OBS MODERATE 35: CPT | Performed by: STUDENT IN AN ORGANIZED HEALTH CARE EDUCATION/TRAINING PROGRAM

## 2025-02-05 PROCEDURE — 97161 PT EVAL LOW COMPLEX 20 MIN: CPT

## 2025-02-05 PROCEDURE — G0378 HOSPITAL OBSERVATION PER HR: HCPCS

## 2025-02-05 PROCEDURE — 96372 THER/PROPH/DIAG INJ SC/IM: CPT

## 2025-02-05 PROCEDURE — 6370000000 HC RX 637 (ALT 250 FOR IP): Performed by: INTERNAL MEDICINE

## 2025-02-05 PROCEDURE — 6360000002 HC RX W HCPCS: Performed by: INTERNAL MEDICINE

## 2025-02-05 PROCEDURE — 80053 COMPREHEN METABOLIC PANEL: CPT

## 2025-02-05 RX ORDER — OSELTAMIVIR PHOSPHATE 30 MG/1
30 CAPSULE ORAL DAILY
Status: DISCONTINUED | OUTPATIENT
Start: 2025-02-05 | End: 2025-02-07 | Stop reason: HOSPADM

## 2025-02-05 RX ORDER — CEFDINIR 300 MG/1
300 CAPSULE ORAL EVERY 12 HOURS SCHEDULED
Status: DISCONTINUED | OUTPATIENT
Start: 2025-02-05 | End: 2025-02-07 | Stop reason: HOSPADM

## 2025-02-05 RX ORDER — DOXYCYCLINE 100 MG/1
100 CAPSULE ORAL EVERY 12 HOURS SCHEDULED
Status: DISCONTINUED | OUTPATIENT
Start: 2025-02-05 | End: 2025-02-07 | Stop reason: HOSPADM

## 2025-02-05 RX ORDER — IPRATROPIUM BROMIDE AND ALBUTEROL SULFATE 2.5; .5 MG/3ML; MG/3ML
1 SOLUTION RESPIRATORY (INHALATION) EVERY 4 HOURS PRN
Status: DISCONTINUED | OUTPATIENT
Start: 2025-02-05 | End: 2025-02-07 | Stop reason: HOSPADM

## 2025-02-05 RX ADMIN — ENOXAPARIN SODIUM 30 MG: 100 INJECTION SUBCUTANEOUS at 08:09

## 2025-02-05 RX ADMIN — ROPINIROLE HYDROCHLORIDE 8 MG: 2 TABLET, FILM COATED ORAL at 06:00

## 2025-02-05 RX ADMIN — TROSPIUM CHLORIDE 20 MG: 20 TABLET, FILM COATED ORAL at 16:45

## 2025-02-05 RX ADMIN — TROSPIUM CHLORIDE 20 MG: 20 TABLET, FILM COATED ORAL at 06:00

## 2025-02-05 RX ADMIN — CEFDINIR 300 MG: 300 CAPSULE ORAL at 20:35

## 2025-02-05 RX ADMIN — DOXYCYCLINE HYCLATE 100 MG: 100 CAPSULE ORAL at 20:35

## 2025-02-05 RX ADMIN — DOXYCYCLINE HYCLATE 100 MG: 100 CAPSULE ORAL at 11:17

## 2025-02-05 RX ADMIN — OSELTAMIVIR PHOSPHATE 30 MG: 30 CAPSULE ORAL at 11:23

## 2025-02-05 RX ADMIN — CEFDINIR 300 MG: 300 CAPSULE ORAL at 11:17

## 2025-02-05 RX ADMIN — CARBIDOPA AND LEVODOPA 1 TABLET: 25; 100 TABLET ORAL at 08:09

## 2025-02-05 RX ADMIN — ACETAMINOPHEN 650 MG: 325 TABLET ORAL at 08:09

## 2025-02-05 RX ADMIN — CARBIDOPA AND LEVODOPA 1 TABLET: 25; 100 TABLET ORAL at 16:45

## 2025-02-05 RX ADMIN — SODIUM CHLORIDE, PRESERVATIVE FREE 10 ML: 5 INJECTION INTRAVENOUS at 20:35

## 2025-02-05 RX ADMIN — SODIUM CHLORIDE, PRESERVATIVE FREE 10 ML: 5 INJECTION INTRAVENOUS at 08:11

## 2025-02-05 RX ADMIN — CARBIDOPA AND LEVODOPA 1 TABLET: 25; 100 TABLET ORAL at 12:40

## 2025-02-05 RX ADMIN — ROSUVASTATIN CALCIUM 20 MG: 20 TABLET, FILM COATED ORAL at 20:35

## 2025-02-05 RX ADMIN — CARBIDOPA AND LEVODOPA 1 TABLET: 25; 100 TABLET ORAL at 20:35

## 2025-02-05 ASSESSMENT — ENCOUNTER SYMPTOMS
NAUSEA: 0
CONSTIPATION: 0
DIARRHEA: 0
ABDOMINAL PAIN: 0
VOMITING: 0

## 2025-02-05 NOTE — CARE COORDINATION
Social Work discharge planning  Pt is from [facility]. SW spoke to Emmy, who advised pt has had recent falls there with her ww. PT [facility] 12 today. SW spoke to pt and lvm for daughter Jigna to discuss snf. Gave snf choices list. Asked for 3 choices asap.   Electronically signed by VIDHYA Smith on 2/5/2025 at 2:48 PM     Addendum  SW met with pt, son Ricardo, daughter Virginia and grandson. They all agree with snf plan and will make 3 choices from list asap.   Electronically signed by VIDHYA Smith on 2/5/2025 at 3:29 PM

## 2025-02-05 NOTE — PLAN OF CARE
Problem: ABCDS Injury Assessment  Goal: Absence of physical injury  Outcome: Progressing     Problem: Skin/Tissue Integrity  Goal: Skin integrity remains intact  Description: 1.  Monitor for areas of redness and/or skin breakdown  2.  Assess vascular access sites hourly  3.  Every 4-6 hours minimum:  Change oxygen saturation probe site  4.  Every 4-6 hours:  If on nasal continuous positive airway pressure, respiratory therapy assess nares and determine need for appliance change or resting period  Outcome: Progressing     Problem: Discharge Planning  Goal: Discharge to home or other facility with appropriate resources  Outcome: Progressing     Problem: Safety - Adult  Goal: Free from fall injury  Outcome: Progressing

## 2025-02-05 NOTE — PROGRESS NOTES
Occupational Therapy  OCCUPATIONAL THERAPY INITIAL EVALUATION    [facility]   [address], [place]         Date:2025                                                  Patient Name: Gianna Britt    MRN: [id number]    : 1940    Room: [address] [place] [address]      Evaluating OT: Krystle Thurston OTR/L [id number]       Referring Provider:Annette Durham APRN - NP      Specific Provider Orders/Date:OT eval and treat 25       Diagnosis: Pneumonia of Both Lungs and Cellulitis  of LLE     Surgery: none      Pertinent Medical History: DJD,HTN, Parkinson's        Precautions:  Fall Risk, Droplet    Assessment of current deficits    [x] Functional mobility  [x]ADLs  [x] Strength               []Cognition    [x] Functional transfers   [x] IADLs         [x] Safety Awareness   [x]Endurance    [] Fine Coordination              [x] Balance      [] Vision/perception   []Sensation     []Gross Motor Coordination  [] ROM  [] Delirium                   [] Motor Control     OT PLAN OF CARE   OT POC based on physician orders, patient diagnosis and results of clinical assessment    Frequency/Duration 1-3 days/wk for 2 weeks PRN   Specific OT Treatment Interventions to include:   * Instruction/training on adapted ADL techniques and AE recommendations to increase functional independence within precautions       * Training on energy conservation strategies, correct breathing pattern and techniques to improve independence/tolerance for self-care routine  * Functional transfer/mobility training/DME recommendations for increased independence, safety, and fall prevention  * Patient/Family education to increase follow through with safety techniques and functional independence  * Recommendation of environmental modifications for increased safety with functional transfers/mobility and ADLs  * Therapeutic exercise to improve motor endurance, ROM, and functional strength for

## 2025-02-05 NOTE — PROGRESS NOTES
Physical Therapy  Facility/Department: [address] MED SURG  Physical Therapy Initial Assessment    Name: Gianna Britt  : 1940  MRN: [id number]  Date of Service: 2025    Attending Provider:  Derick Chandra MD    Evaluating PT:  Ean Sher Jr., P.T.    Room #:  0519/0519-A  Diagnosis:  Cellulitis of left lower leg [L03.116]  Fall, initial encounter [W19.XXXA]  Skin tear of left forearm without complication, initial encounter [S51.812A]  Pneumonia of both lungs due to infectious organism, unspecified part of lung [J18.9]  Community acquired pneumonia, bilateral [J18.9]  Pertinent PMHx/PSHx:  Parkinson's disease  Precautions:  falls, bed/chair alarm, [place]    SUBJECTIVE:    Pt lives at [facility] Independent living with a ramp to enter. Pt ambulated with a ww PTA.  She states she has been receiving home PT due to weakness and impaired balance b/o Parkinson's disease.     OBJECTIVE:   Initial Evaluation  Date: 25 Treatment Short Term/ Long Term   Goals   Was pt agreeable to Eval/treatment? yes     Does pt have pain? No c/o pain     Bed Mobility  Rolling: MOD A  Supine to sit: MOD A  Sit to supine: NA  Scooting: MOD A  Independent    Transfers Sit to stand: MOD A  Stand to sit: MOD A  Stand pivot: MOD A with ww  Independent   Ambulation   3 feet with ww MOD A  200 feet with ww supervision   Stair negotiation: ascended and descended NA  NA   AM-PAC 6 Clicks        BLE ROM is WFL.   BLE strength is grossly 3-/5 to 4-/5.   Balance: sitting is SBA/MIN A due to occasional posterior lean and standing with ww is MOD A due to strong posterior lean.   Endurance: fair-    Patient education  Pt educated on bed mobility, transfers, and gait sequence with ww.    Patient response to education:   Pt verbalized understanding Pt demonstrated skill Pt requires further education in this area   yes yes yes     ASSESSMENT:    Conditions Requiring Skilled Therapeutic Intervention:    [x]Decreased

## 2025-02-05 NOTE — PLAN OF CARE
Problem: ABCDS Injury Assessment  Goal: Absence of physical injury  2/4/2025 2256 by Tameka Moser RN  Outcome: Progressing  2/4/2025 1944 by Maria G Phan RN  Outcome: Progressing     Problem: Skin/Tissue Integrity  Goal: Skin integrity remains intact  Description: 1.  Monitor for areas of redness and/or skin breakdown  2.  Assess vascular access sites hourly  3.  Every 4-6 hours minimum:  Change oxygen saturation probe site  4.  Every 4-6 hours:  If on nasal continuous positive airway pressure, respiratory therapy assess nares and determine need for appliance change or resting period  2/4/2025 2256 by Tameka Moser RN  Outcome: Progressing  2/4/2025 1944 by Maria G Phan RN  Outcome: Progressing     Problem: Discharge Planning  Goal: Discharge to home or other facility with appropriate resources  2/4/2025 2256 by Tameka Moser RN  Outcome: Progressing  2/4/2025 1944 by Maria G Phan RN  Outcome: Progressing     Problem: Safety - Adult  Goal: Free from fall injury  2/4/2025 2256 by Tameka Moser RN  Outcome: Progressing  2/4/2025 1944 by Maria G Phan RN  Outcome: Progressing     Problem: Infection - Adult  Goal: Absence of infection at discharge  Outcome: Progressing  Goal: Absence of infection during hospitalization  Outcome: Progressing  Goal: Absence of fever/infection during anticipated neutropenic period  Outcome: Progressing

## 2025-02-05 NOTE — PROGRESS NOTES
Okay for dounebs PRN for wheezing. Electronically signed by Linda Scruggs RN on 2/5/2025 at 4:54 PM

## 2025-02-05 NOTE — CONSULTS
GENERAL SURGERY  CONSULT NOTE    Patient's Name/Date of Birth: Gianna Britt / 1940    Date: February 5, 2025     PCP: Henri Reynolds MD     Chief Complaint:   Chief Complaint   Patient presents with    Fall     Pt fell off toilet no loc and has a wound to left lower leg. Leg warm red and swollen       Physician Consulted: Dr. Oswald  Reason for Consult: Umbilical hernia  Referring Physician: Dr. Prateek MILLS  Gianna Britt is a 84 y.o. female with hx of Parkinson's, HTN, Afib on Eliquis who presents from her home for evaluation after a mechanical fall; she was found to have pneumonia on CXR therefore she was admitted for antibiotics and further management. She does admit to having a recent cough and runny nose. Febrile to 103 while in ED. She additionally has been found to be Influenza A positive.     General surgery has been consulted for umbilical hernia. She reports this hernia is new for her, states it presented about 3 days ago. It is more noticeable when she is sitting up in a chair. It is not bothersome to her or painful. Denied obstructive symptoms. No prior hernias.       Past Medical History:   Diagnosis Date    Degenerative joint disease involving multiple joints     Hyperlipidemia     Hypertension     Parkinson disease (HCC)     Skin cancer     scc       Past Surgical History:   Procedure Laterality Date    KNEE SURGERY  left    TONSILLECTOMY AND ADENOIDECTOMY         Medications Prior to Admission:    Prior to Admission medications    Medication Sig Start Date End Date Taking? Authorizing Provider   carbidopa-levodopa (SINEMET)  MG per tablet 1 tablet 4 times daily @ 8 am  @ 12 pm  @ 4 pm  @ 8 pm 1/2/25  Yes ProviderMatt MD   rosuvastatin (CRESTOR) 20 MG tablet Take 1 tablet by mouth nightly 1/29/25  Yes Henri Reynolds MD   mirabegron (MYRBETRIQ) 25 MG TB24 Take 2 tablets by mouth daily 12/15/21  Yes ProviderMatt MD   carbidopa-levodopa (SINEMET)  MG per tablet  Take 1 tablet by mouth at bedtime @11pm 4/26/19  Yes Matt Parmar MD   ropinirole (REQUIP) 1 MG tablet Take 8 tablets by mouth every morning (before breakfast)   Yes ProviderMatt MD   nortriptyline (PAMELOR) 10 MG capsule Take 1 capsule by mouth nightly   Yes Provider, Historical, MD   Handicap Placard MISC by Does not apply route 5 years 10/24/23   Rodriguez, [place] L, DO       No Known Allergies    Family History   Problem Relation Age of Onset    No Known Problems Mother     No Known Problems Father     ; No family history of problems with anesthesia     Social History     Tobacco Use    Smoking status: Never    Smokeless tobacco: Never   Vaping Use    Vaping status: Never Used   Substance Use Topics    Alcohol use: Not Currently    Drug use: No         Review of Systems   Review of Systems   Gastrointestinal:  Negative for abdominal pain, constipation, diarrhea, nausea and vomiting.   All other systems reviewed and are negative.        PHYSICAL EXAM:    Vitals:    02/05/25 1212   BP:    Pulse:    Resp:    Temp: 98.2 °F (36.8 °C)   SpO2:        General appearance:  lying in bed, NAD, hard of hearing  Lungs: symmetric chest rise, no respiratory distress  Heart: normal rate per peripheral pulse  Abdomen: soft, non-distended, non-tender, small maryellen-umbilical hernia, no inguinal or femoral hernias palpated.   Skin: warm and dry, no cyanosis      LABS:  CBC  Recent Labs     02/05/25  0723   WBC 4.5   HGB 11.5   HCT 36.9        BMP  Recent Labs     02/05/25  0723      K 3.8      CO2 19*   BUN 20   CREATININE 1.1*   CALCIUM 7.9*     Liver Function  Recent Labs     02/05/25  0723   BILITOT 0.3   AST 32*   ALT <5   ALKPHOS 47     No results for input(s): \"LACTATE\" in the last 72 hours.  No results for input(s): \"INR\" in the last 72 hours.    Invalid input(s): \"PT\", \"PTT\"    RADIOLOGY  I have personally reviewed all relevant imaging      ASSESSMENT:      Patient Active Problem List

## 2025-02-05 NOTE — PROGRESS NOTES
[facility] Hospitalist Progress Note    Admitting Date and Time: 2/4/2025 12:30 PM  Admit Dx: Cellulitis of left lower leg [L03.116]  Fall, initial encounter [W19.XXXA]  Skin tear of left forearm without complication, initial encounter [S51.812A]  Pneumonia of both lungs due to infectious organism, unspecified part of lung [J18.9]  Community acquired pneumonia, bilateral [J18.9]    Subjective:  Patient is being followed for Cellulitis of left lower leg [L03.116]  Fall, initial encounter [W19.XXXA]  Skin tear of left forearm without complication, initial encounter [S51.812A]  Pneumonia of both lungs due to infectious organism, unspecified part of lung [J18.9]  Community acquired pneumonia, bilateral [J18.9]   Pt feels okay  Per RN: no additional concerns    ROS: denies fever, chills, cp, sob, n/v, HA unless otherwise noted above     oseltamivir  30 mg Oral Daily    cefdinir  300 mg Oral 2 times per day    doxycycline hyclate  100 mg Oral 2 times per day    carbidopa-levodopa  1 tablet Oral 4x Daily    trospium  20 mg Oral BID AC    rOPINIRole  8 mg Oral QAM AC    rosuvastatin  20 mg Oral Nightly    sodium chloride flush  5-40 mL IntraVENous 2 times per day    enoxaparin  30 mg SubCUTAneous Daily     ipratropium 0.5 mg-albuterol 2.5 mg, 1 Dose, Q4H PRN  sodium chloride flush, 5-40 mL, PRN  sodium chloride, , PRN  ondansetron, 4 mg, Q8H PRN   Or  ondansetron, 4 mg, Q6H PRN  polyethylene glycol, 17 g, Daily PRN  acetaminophen, 650 mg, Q6H PRN   Or  acetaminophen, 650 mg, Q6H PRN         Objective:  /76   Pulse 72   Temp 98.2 °F (36.8 °C) (Oral)   Resp 16   Ht 1.422 m (4' 8\")   Wt 54.9 kg (121 lb)   SpO2 90%   BMI 27.13 kg/m²     General Appearance: alert and oriented to person, place and time and in NAD, laying in bed  Skin: warm and dry  Head: normocephalic and atraumatic  Eyes: PERRL, EOMI, conjunctivae normal  Neck: neck supple, trachea midline   Pulmonary/Chest: CTAB, no respiratory distress,

## 2025-02-06 PROBLEM — J15.9 BACTERIAL PNEUMONIA: Status: ACTIVE | Noted: 2025-02-06

## 2025-02-06 PROBLEM — J10.1 INFLUENZA A: Status: ACTIVE | Noted: 2025-02-06

## 2025-02-06 LAB
EKG ATRIAL RATE: 86 BPM
EKG P AXIS: 38 DEGREES
EKG P-R INTERVAL: 164 MS
EKG Q-T INTERVAL: 368 MS
EKG QRS DURATION: 78 MS
EKG QTC CALCULATION (BAZETT): 440 MS
EKG R AXIS: 19 DEGREES
EKG T AXIS: 49 DEGREES
EKG VENTRICULAR RATE: 86 BPM
MICROORGANISM SPEC CULT: ABNORMAL
MICROORGANISM SPEC CULT: ABNORMAL
SERVICE CMNT-IMP: ABNORMAL
SPECIMEN DESCRIPTION: ABNORMAL

## 2025-02-06 PROCEDURE — 6360000002 HC RX W HCPCS: Performed by: INTERNAL MEDICINE

## 2025-02-06 PROCEDURE — 2500000003 HC RX 250 WO HCPCS: Performed by: INTERNAL MEDICINE

## 2025-02-06 PROCEDURE — G0378 HOSPITAL OBSERVATION PER HR: HCPCS

## 2025-02-06 PROCEDURE — 6370000000 HC RX 637 (ALT 250 FOR IP): Performed by: INTERNAL MEDICINE

## 2025-02-06 PROCEDURE — 6370000000 HC RX 637 (ALT 250 FOR IP): Performed by: STUDENT IN AN ORGANIZED HEALTH CARE EDUCATION/TRAINING PROGRAM

## 2025-02-06 PROCEDURE — 99232 SBSQ HOSP IP/OBS MODERATE 35: CPT | Performed by: INTERNAL MEDICINE

## 2025-02-06 PROCEDURE — 93010 ELECTROCARDIOGRAM REPORT: CPT | Performed by: INTERNAL MEDICINE

## 2025-02-06 PROCEDURE — 94640 AIRWAY INHALATION TREATMENT: CPT

## 2025-02-06 PROCEDURE — 96372 THER/PROPH/DIAG INJ SC/IM: CPT

## 2025-02-06 RX ADMIN — CARBIDOPA AND LEVODOPA 1 TABLET: 25; 100 TABLET ORAL at 07:56

## 2025-02-06 RX ADMIN — ROPINIROLE HYDROCHLORIDE 8 MG: 2 TABLET, FILM COATED ORAL at 06:03

## 2025-02-06 RX ADMIN — DOXYCYCLINE HYCLATE 100 MG: 100 CAPSULE ORAL at 07:57

## 2025-02-06 RX ADMIN — DOXYCYCLINE HYCLATE 100 MG: 100 CAPSULE ORAL at 20:46

## 2025-02-06 RX ADMIN — IPRATROPIUM BROMIDE AND ALBUTEROL SULFATE 1 DOSE: .5; 3 SOLUTION RESPIRATORY (INHALATION) at 14:15

## 2025-02-06 RX ADMIN — CARBIDOPA AND LEVODOPA 1 TABLET: 25; 100 TABLET ORAL at 20:46

## 2025-02-06 RX ADMIN — ROSUVASTATIN CALCIUM 20 MG: 20 TABLET, FILM COATED ORAL at 20:46

## 2025-02-06 RX ADMIN — SODIUM CHLORIDE, PRESERVATIVE FREE 10 ML: 5 INJECTION INTRAVENOUS at 07:58

## 2025-02-06 RX ADMIN — ENOXAPARIN SODIUM 30 MG: 100 INJECTION SUBCUTANEOUS at 07:57

## 2025-02-06 RX ADMIN — CARBIDOPA AND LEVODOPA 1 TABLET: 25; 100 TABLET ORAL at 16:29

## 2025-02-06 RX ADMIN — CEFDINIR 300 MG: 300 CAPSULE ORAL at 07:56

## 2025-02-06 RX ADMIN — OSELTAMIVIR PHOSPHATE 30 MG: 30 CAPSULE ORAL at 07:57

## 2025-02-06 RX ADMIN — TROSPIUM CHLORIDE 20 MG: 20 TABLET, FILM COATED ORAL at 06:03

## 2025-02-06 RX ADMIN — CEFDINIR 300 MG: 300 CAPSULE ORAL at 20:46

## 2025-02-06 RX ADMIN — TROSPIUM CHLORIDE 20 MG: 20 TABLET, FILM COATED ORAL at 16:29

## 2025-02-06 RX ADMIN — CARBIDOPA AND LEVODOPA 1 TABLET: 25; 100 TABLET ORAL at 12:31

## 2025-02-06 RX ADMIN — SODIUM CHLORIDE, PRESERVATIVE FREE 10 ML: 5 INJECTION INTRAVENOUS at 20:46

## 2025-02-06 ASSESSMENT — PAIN SCALES - GENERAL: PAINLEVEL_OUTOF10: 0

## 2025-02-06 NOTE — CARE COORDINATION
Social Work discharge planning  Sw met with pt and daughter Jigna. They have chosen 1. [facility] - referral made to [place]. 2. [facility] 3. [facility]. Await snf eval.  Electronically signed by VIDHYA Smith on 2/6/2025 at 10:38 AM     Addendum  [facility] accepted and will start precert today. Pt is OBS, so PASRR completed. N17 started in [facility].  Electronically signed by VIDHYA Smith on 2/6/2025 at 12:54 PM

## 2025-02-06 NOTE — PATIENT CARE CONFERENCE
[facility] Quality Flow/Interdisciplinary Rounds Progress Note        Quality Flow Rounds held on February 6, 2025    Disciplines Attending:  Bedside Nurse, , , and Nursing Unit Leadership    Gianna Britt was admitted on 2/4/2025 12:30 PM    Anticipated Discharge Date:       Disposition:    Enrrique Score:  Enrrique Scale Score: 14    [facility] RISK OF UNPLANNED READMISSION 2.0             0 Total Score        Discussed patient goal for the day, patient clinical progression, and barriers to discharge.  The following Goal(s) of the Day/Commitment(s) have been identified:  Diagnostics - Report Results      Heike Paz RN  February 6, 2025

## 2025-02-06 NOTE — PLAN OF CARE
Problem: ABCDS Injury Assessment  Goal: Absence of physical injury  2/6/2025 0022 by Casey Andrade RN  Outcome: Progressing  2/5/2025 1210 by Selwyn Durham RN  Outcome: Progressing     Problem: Skin/Tissue Integrity  Goal: Skin integrity remains intact  Description: 1.  Monitor for areas of redness and/or skin breakdown  2.  Assess vascular access sites hourly  3.  Every 4-6 hours minimum:  Change oxygen saturation probe site  4.  Every 4-6 hours:  If on nasal continuous positive airway pressure, respiratory therapy assess nares and determine need for appliance change or resting period  2/6/2025 0022 by Casey Andrade RN  Outcome: Progressing  2/5/2025 1210 by Selwyn Durham RN  Outcome: Progressing     Problem: Discharge Planning  Goal: Discharge to home or other facility with appropriate resources  2/6/2025 0022 by Casey Andrade RN  Outcome: Progressing  2/5/2025 1210 by Selwyn Durham RN  Outcome: Progressing     Problem: Safety - Adult  Goal: Free from fall injury  2/6/2025 0022 by Casey Andrade RN  Outcome: Progressing  2/5/2025 1210 by Selwyn Durham RN  Outcome: Progressing     Problem: Infection - Adult  Goal: Absence of infection at discharge  2/6/2025 0022 by Casey Andrade RN  Outcome: Progressing  2/5/2025 1210 by Selwyn Durham RN  Outcome: Progressing  Goal: Absence of infection during hospitalization  Outcome: Progressing  Goal: Absence of fever/infection during anticipated neutropenic period  Outcome: Progressing     Problem: Skin/Tissue Integrity - Adult  Goal: Skin integrity remains intact  Description: 1.  Monitor for areas of redness and/or skin breakdown  2.  Assess vascular access sites hourly  3.  Every 4-6 hours minimum:  Change oxygen saturation probe site  4.  Every 4-6 hours:  If on nasal continuous positive airway pressure, respiratory therapy assess nares and determine need for appliance change or resting period  2/6/2025 0022 by Casey Andrade RN  Outcome:

## 2025-02-06 NOTE — PROGRESS NOTES
[facility] Hospitalist   Progress Note    Admitting Date and Time: 2/4/2025 12:30 PM  Admit Dx: Cellulitis of left lower leg [L03.116]  Fall, initial encounter [W19.XXXA]  Skin tear of left forearm without complication, initial encounter [S51.812A]  Pneumonia of both lungs due to infectious organism, unspecified part of lung [J18.9]  Community acquired pneumonia, bilateral [J18.9]    Subjective:    Patient was admitted with Cellulitis of left lower leg [L03.116]  Fall, initial encounter [W19.XXXA]  Skin tear of left forearm without complication, initial encounter [S51.812A]  Pneumonia of both lungs due to infectious organism, unspecified part of lung [J18.9]  Community acquired pneumonia, bilateral [J18.9]. Patient denies fever, chills, cp, sob, n/v.     oseltamivir  30 mg Oral Daily    cefdinir  300 mg Oral 2 times per day    doxycycline hyclate  100 mg Oral 2 times per day    carbidopa-levodopa  1 tablet Oral 4x Daily    trospium  20 mg Oral BID AC    rOPINIRole  8 mg Oral QAM AC    rosuvastatin  20 mg Oral Nightly    sodium chloride flush  5-40 mL IntraVENous 2 times per day    enoxaparin  30 mg SubCUTAneous Daily     ipratropium 0.5 mg-albuterol 2.5 mg, 1 Dose, Q4H PRN  sodium chloride flush, 5-40 mL, PRN  sodium chloride, , PRN  ondansetron, 4 mg, Q8H PRN   Or  ondansetron, 4 mg, Q6H PRN  polyethylene glycol, 17 g, Daily PRN  acetaminophen, 650 mg, Q6H PRN   Or  acetaminophen, 650 mg, Q6H PRN         Objective:    BP (!) 155/93   Pulse 65   Temp (!) 33.8 °F (1 °C)   Resp 18   Ht 1.422 m (4' 8\")   Wt 55.5 kg (122 lb 4.8 oz)   SpO2 95%   BMI 27.42 kg/m²   Skin: warm and dry, no rash or erythema  Pulmonary/Chest: clear to auscultation bilaterally- no wheezes, rales or rhonchi, normal air movement, no respiratory distress  Cardiovascular: rhythm reg at rate of 64  Abdomen: soft, non-tender, non-distended, normal bowel sounds, no masses or organomegaly  Extremities: no cyanosis, no clubbing,

## 2025-02-06 NOTE — PROGRESS NOTES
Attending Physician Statement:    Chief Complaint:   Chief Complaint   Patient presents with    Fall     Pt fell off toilet no loc and has a wound to left lower leg. Leg warm red and swollen       I have examined the patient and performed the key aspects of physical exam, reviewed the record (including all new notes, pertinent radiology images which are independently reviewed and laboratory findings), and discussed the case with the surgical team.  I agree with the assessment and plan with the following additions, corrections, and changes. 14pt review of symptoms completed and negative except as mentioned.    Was coughing from her influenza and felt a pop in her upper abdomen.  There is a soft and reducible supraumbilical hernia.  No immediate intervention needed.  She can follow-up in the office to further discuss.    Samuel Oswald MD  02/06/25  2:42 PM    NOTE: This report, in part or full, may have been transcribed using voice recognition software. Every effort was made to ensure accuracy; however, inadvertent computerized transcription errors may be present. Please excuse any transcriptional grammatical or spelling errors that may have escaped my editorial review.

## 2025-02-06 NOTE — DISCHARGE INSTR - COC
Continuity of Care Form    Patient Name: Gianna Britt   :  1940  MRN:  [id number]    Admit date:  2025  Discharge date:  ***    Code Status Order: Full Code   Advance Directives:   Advance Care Flowsheet Documentation             Admitting Physician:  Shoaib Ny MD  PCP: Henri Reynolds MD    Discharging Nurse: ***  Discharging Hospital Unit/Room#: 0519/0519-A  Discharging Unit Phone Number: [phone]    Emergency Contact:   Extended Emergency Contact Information  Primary Emergency Contact: Jigna Britt  Home Phone: [phone]  Relation: Child  Secondary Emergency Contact: brenna britt  Home Phone: [phone]  Relation: Other    Past Surgical History:  Past Surgical History:   Procedure Laterality Date    KNEE SURGERY  left    TONSILLECTOMY AND ADENOIDECTOMY         Immunization History:   Immunization History   Administered Date(s) Administered    COVID-19, MODERNA BLUE border, Primary or Immunocompromised, (age 12y+), IM, 100 mcg/0.5mL 2021, 2021    TD 5LF, TENIVAC, (age 7y+), IM, 0.5mL 2025    TDaP, ADACEL (age 10y-64y), BOOSTRIX (age 10y+), IM, 0.5mL 2023       Active Problems:  Patient Active Problem List   Diagnosis Code    At high risk for falls Z91.81    Degenerative joint disease involving multiple joints M15.9    Hyperlipidemia E78.5    Parkinson's disease (HCC) G20.A1    Hypertension I10    Stage 3a chronic kidney disease (HCC) N18.31    Pneumonia of both lungs due to infectious organism J18.9       Isolation/Infection:   Isolation            Droplet          Patient Infection Status       Infection Onset Added Last Indicated Last Indicated By Review Planned Expiration Resolved Resolved By    Influenza 25 Respiratory Panel, Molecular, with COVID-19 (Restricted: peds pts or suitable admitted adults) 25                         Nurse Assessment:  Last Vital Signs: BP (!) 155/93   Pulse 65   Temp (!) 33.8 °F (1 °C)   Resp

## 2025-02-07 VITALS
RESPIRATION RATE: 18 BRPM | SYSTOLIC BLOOD PRESSURE: 136 MMHG | TEMPERATURE: 98 F | BODY MASS INDEX: 27.44 KG/M2 | OXYGEN SATURATION: 96 % | HEART RATE: 72 BPM | DIASTOLIC BLOOD PRESSURE: 80 MMHG | HEIGHT: 56 IN | WEIGHT: 122 LBS

## 2025-02-07 LAB
ALBUMIN SERPL-MCNC: 3.4 G/DL (ref 3.5–5.2)
ALP SERPL-CCNC: 50 U/L (ref 35–104)
ALT SERPL-CCNC: <5 U/L (ref 0–32)
ANION GAP SERPL CALCULATED.3IONS-SCNC: 10 MMOL/L (ref 7–16)
AST SERPL-CCNC: 34 U/L (ref 0–31)
BASOPHILS # BLD: 0.02 K/UL (ref 0–0.2)
BASOPHILS NFR BLD: 1 % (ref 0–2)
BILIRUB SERPL-MCNC: 0.3 MG/DL (ref 0–1.2)
BUN SERPL-MCNC: 24 MG/DL (ref 6–23)
CALCIUM SERPL-MCNC: 8.4 MG/DL (ref 8.6–10.2)
CHLORIDE SERPL-SCNC: 103 MMOL/L (ref 98–107)
CO2 SERPL-SCNC: 24 MMOL/L (ref 22–29)
CREAT SERPL-MCNC: 1.1 MG/DL (ref 0.5–1)
EOSINOPHIL # BLD: 0.02 K/UL (ref 0.05–0.5)
EOSINOPHILS RELATIVE PERCENT: 1 % (ref 0–6)
ERYTHROCYTE [DISTWIDTH] IN BLOOD BY AUTOMATED COUNT: 12.9 % (ref 11.5–15)
GFR, ESTIMATED: 48 ML/MIN/1.73M2
GLUCOSE SERPL-MCNC: 90 MG/DL (ref 74–99)
HCT VFR BLD AUTO: 40.6 % (ref 34–48)
HGB BLD-MCNC: 12.9 G/DL (ref 11.5–15.5)
IMM GRANULOCYTES # BLD AUTO: <0.03 K/UL (ref 0–0.58)
IMM GRANULOCYTES NFR BLD: 0 % (ref 0–5)
LYMPHOCYTES NFR BLD: 0.95 K/UL (ref 1.5–4)
LYMPHOCYTES RELATIVE PERCENT: 28 % (ref 20–42)
MCH RBC QN AUTO: 31.6 PG (ref 26–35)
MCHC RBC AUTO-ENTMCNC: 31.8 G/DL (ref 32–34.5)
MCV RBC AUTO: 99.5 FL (ref 80–99.9)
MICROORGANISM SPEC CULT: ABNORMAL
MICROORGANISM/AGENT SPEC: ABNORMAL
MONOCYTES NFR BLD: 0.55 K/UL (ref 0.1–0.95)
MONOCYTES NFR BLD: 16 % (ref 2–12)
NEUTROPHILS NFR BLD: 55 % (ref 43–80)
NEUTS SEG NFR BLD: 1.87 K/UL (ref 1.8–7.3)
PLATELET # BLD AUTO: 206 K/UL (ref 130–450)
PMV BLD AUTO: 9.6 FL (ref 7–12)
POTASSIUM SERPL-SCNC: 3.6 MMOL/L (ref 3.5–5)
PROT SERPL-MCNC: 6 G/DL (ref 6.4–8.3)
RBC # BLD AUTO: 4.08 M/UL (ref 3.5–5.5)
SODIUM SERPL-SCNC: 137 MMOL/L (ref 132–146)
SPECIMEN DESCRIPTION: ABNORMAL
WBC OTHER # BLD: 3.4 K/UL (ref 4.5–11.5)

## 2025-02-07 PROCEDURE — 6370000000 HC RX 637 (ALT 250 FOR IP): Performed by: STUDENT IN AN ORGANIZED HEALTH CARE EDUCATION/TRAINING PROGRAM

## 2025-02-07 PROCEDURE — 6360000002 HC RX W HCPCS: Performed by: INTERNAL MEDICINE

## 2025-02-07 PROCEDURE — 2500000003 HC RX 250 WO HCPCS: Performed by: INTERNAL MEDICINE

## 2025-02-07 PROCEDURE — 96372 THER/PROPH/DIAG INJ SC/IM: CPT

## 2025-02-07 PROCEDURE — 80053 COMPREHEN METABOLIC PANEL: CPT

## 2025-02-07 PROCEDURE — G0378 HOSPITAL OBSERVATION PER HR: HCPCS

## 2025-02-07 PROCEDURE — 99239 HOSP IP/OBS DSCHRG MGMT >30: CPT | Performed by: INTERNAL MEDICINE

## 2025-02-07 PROCEDURE — 6370000000 HC RX 637 (ALT 250 FOR IP): Performed by: INTERNAL MEDICINE

## 2025-02-07 PROCEDURE — 85025 COMPLETE CBC W/AUTO DIFF WBC: CPT

## 2025-02-07 RX ORDER — DOXYCYCLINE 100 MG/1
100 CAPSULE ORAL EVERY 12 HOURS SCHEDULED
DISCHARGE
Start: 2025-02-07 | End: 2025-02-10

## 2025-02-07 RX ORDER — OSELTAMIVIR PHOSPHATE 30 MG/1
30 CAPSULE ORAL DAILY
DISCHARGE
Start: 2025-02-08 | End: 2025-02-10

## 2025-02-07 RX ORDER — CEFDINIR 300 MG/1
300 CAPSULE ORAL EVERY 12 HOURS SCHEDULED
DISCHARGE
Start: 2025-02-07 | End: 2025-02-09

## 2025-02-07 RX ADMIN — DOXYCYCLINE HYCLATE 100 MG: 100 CAPSULE ORAL at 09:12

## 2025-02-07 RX ADMIN — OSELTAMIVIR PHOSPHATE 30 MG: 30 CAPSULE ORAL at 09:11

## 2025-02-07 RX ADMIN — CARBIDOPA AND LEVODOPA 1 TABLET: 25; 100 TABLET ORAL at 17:08

## 2025-02-07 RX ADMIN — SODIUM CHLORIDE, PRESERVATIVE FREE 10 ML: 5 INJECTION INTRAVENOUS at 09:12

## 2025-02-07 RX ADMIN — CEFDINIR 300 MG: 300 CAPSULE ORAL at 09:12

## 2025-02-07 RX ADMIN — ENOXAPARIN SODIUM 30 MG: 100 INJECTION SUBCUTANEOUS at 09:12

## 2025-02-07 RX ADMIN — TROSPIUM CHLORIDE 20 MG: 20 TABLET, FILM COATED ORAL at 06:11

## 2025-02-07 RX ADMIN — CARBIDOPA AND LEVODOPA 1 TABLET: 25; 100 TABLET ORAL at 13:24

## 2025-02-07 RX ADMIN — ROPINIROLE HYDROCHLORIDE 8 MG: 2 TABLET, FILM COATED ORAL at 06:11

## 2025-02-07 RX ADMIN — CARBIDOPA AND LEVODOPA 1 TABLET: 25; 100 TABLET ORAL at 09:11

## 2025-02-07 RX ADMIN — TROSPIUM CHLORIDE 20 MG: 20 TABLET, FILM COATED ORAL at 17:08

## 2025-02-07 NOTE — PROGRESS NOTES
Nurse to nurse called to Shanthi at [facility]. All questions answered. Discharge papers faxed.     Electronically signed by Jun Atkinson RN on 2/7/2025 at 5:26 PM

## 2025-02-07 NOTE — PATIENT CARE CONFERENCE
[facility] Quality Flow/Interdisciplinary Rounds Progress Note        Quality Flow Rounds held on February 7, 2025    Disciplines Attending:  Bedside Nurse, , , and Nursing Unit Leadership    Gianna Britt was admitted on 2/4/2025 12:30 PM    Anticipated Discharge Date:       Disposition:    Enrrique Score:  Enrrique Scale Score: 15    [facility] RISK OF UNPLANNED READMISSION 2.0             0 Total Score        Discussed patient goal for the day, patient clinical progression, and barriers to discharge.  The following Goal(s) of the Day/Commitment(s) have been identified:  Diagnostics - Report Results      Heike Paz RN  February 7, 2025

## 2025-02-07 NOTE — CARE COORDINATION
Social Work discharge planning   Pola with [facility] has precert approval and can accept today. Rn aware. Sw placed pt on WILL CALL with Physician's (spoke to Josselyn). Notified pt/family of possible transition to snf today.  Electronically signed by VIDHYA Smith on 2/7/2025 at 9:13 AM     Addendum  Family here and stated awareness of discharge today. Pt to [facility] skilled between 4-6p (per Radha at \[facility]"). Charge Rn aware also.  Electronically signed by VIDHYA Smith on 2/7/2025 at 1:23 PM

## 2025-02-07 NOTE — PLAN OF CARE
Problem: ABCDS Injury Assessment  Goal: Absence of physical injury  2/6/2025 2148 by Casey Andrade RN  Outcome: Progressing  2/6/2025 1126 by Saniya Ferris RN  Outcome: Progressing     Problem: Skin/Tissue Integrity  Goal: Skin integrity remains intact  Description: 1.  Monitor for areas of redness and/or skin breakdown  2.  Assess vascular access sites hourly  3.  Every 4-6 hours minimum:  Change oxygen saturation probe site  4.  Every 4-6 hours:  If on nasal continuous positive airway pressure, respiratory therapy assess nares and determine need for appliance change or resting period  2/6/2025 2148 by Casey Andrade RN  Outcome: Progressing  2/6/2025 1126 by Saniya Ferris RN  Outcome: Progressing     Problem: Discharge Planning  Goal: Discharge to home or other facility with appropriate resources  Outcome: Progressing     Problem: Safety - Adult  Goal: Free from fall injury  2/6/2025 2148 by Casey Andrade RN  Outcome: Progressing  2/6/2025 1126 by Saniya Ferris RN  Outcome: Progressing     Problem: Infection - Adult  Goal: Absence of infection at discharge  Outcome: Progressing  Goal: Absence of infection during hospitalization  Outcome: Progressing  Goal: Absence of fever/infection during anticipated neutropenic period  Outcome: Progressing     Problem: Skin/Tissue Integrity - Adult  Goal: Skin integrity remains intact  Description: 1.  Monitor for areas of redness and/or skin breakdown  2.  Assess vascular access sites hourly  3.  Every 4-6 hours minimum:  Change oxygen saturation probe site  4.  Every 4-6 hours:  If on nasal continuous positive airway pressure, respiratory therapy assess nares and determine need for appliance change or resting period  2/6/2025 2148 by Casey Andrade RN  Outcome: Progressing  2/6/2025 1126 by Saniya Ferris RN  Outcome: Progressing

## 2025-02-07 NOTE — ACP (ADVANCE CARE PLANNING)
Advance Care Planning   Healthcare Decision Maker:    Primary Decision Maker: Jigna Rao - Child - [phone]    Primary Decision Maker: JUANITO OGDEN - Michael - [phone]    Secondary Decision Maker: brenna rao - Other - [phone]    Click here to complete Healthcare Decision Makers including selection of the Healthcare Decision Maker Relationship (ie \"Primary\").

## 2025-02-07 NOTE — PLAN OF CARE
Problem: ABCDS Injury Assessment  Goal: Absence of physical injury  2/7/2025 1618 by Yanique Atkinson RN  Outcome: Adequate for Discharge  2/7/2025 1140 by Yanique Atkinson RN  Outcome: Progressing     Problem: Skin/Tissue Integrity  Goal: Skin integrity remains intact  Description: 1.  Monitor for areas of redness and/or skin breakdown  2.  Assess vascular access sites hourly  3.  Every 4-6 hours minimum:  Change oxygen saturation probe site  4.  Every 4-6 hours:  If on nasal continuous positive airway pressure, respiratory therapy assess nares and determine need for appliance change or resting period  2/7/2025 1618 by Yanique Atkinson RN  Outcome: Adequate for Discharge  2/7/2025 1140 by Yanique Atkinson RN  Outcome: Progressing     Problem: Discharge Planning  Goal: Discharge to home or other facility with appropriate resources  2/7/2025 1618 by Yanique Atkinson RN  Outcome: Adequate for Discharge  2/7/2025 1140 by Yanique Atkinson RN  Outcome: Progressing     Problem: Safety - Adult  Goal: Free from fall injury  2/7/2025 1618 by Yanique Atkinson RN  Outcome: Adequate for Discharge  2/7/2025 1140 by Yanique Atkinson RN  Outcome: Progressing     Problem: Infection - Adult  Goal: Absence of infection at discharge  2/7/2025 1618 by Yanique Atkinson RN  Outcome: Adequate for Discharge  2/7/2025 1140 by Yanique Atkinson RN  Outcome: Progressing  Goal: Absence of infection during hospitalization  2/7/2025 1618 by Yanique Atkinson RN  Outcome: Adequate for Discharge  2/7/2025 1140 by Yanique Atkinson RN  Outcome: Progressing  Goal: Absence of fever/infection during anticipated neutropenic period  2/7/2025 1618 by Yanique Atkinson RN  Outcome: Adequate for Discharge  2/7/2025 1140 by Yanique Atkinson RN  Outcome: Progressing     Problem: Skin/Tissue Integrity - Adult  Goal: Skin integrity remains intact  Description: 1.  Monitor for areas of redness and/or skin breakdown  2.  Assess vascular access sites hourly  3.  Every 4-6 hours minimum:

## 2025-02-07 NOTE — PLAN OF CARE
Problem: ABCDS Injury Assessment  Goal: Absence of physical injury  2/7/2025 1140 by Yanique Atkinson RN  Outcome: Progressing  2/6/2025 2148 by Casey Andrade RN  Outcome: Progressing     Problem: Skin/Tissue Integrity  Goal: Skin integrity remains intact  Description: 1.  Monitor for areas of redness and/or skin breakdown  2.  Assess vascular access sites hourly  3.  Every 4-6 hours minimum:  Change oxygen saturation probe site  4.  Every 4-6 hours:  If on nasal continuous positive airway pressure, respiratory therapy assess nares and determine need for appliance change or resting period  2/7/2025 1140 by Yanique Atiknson RN  Outcome: Progressing  2/6/2025 2148 by Casey Andrade RN  Outcome: Progressing     Problem: Discharge Planning  Goal: Discharge to home or other facility with appropriate resources  2/7/2025 1140 by Yanique Atkinson RN  Outcome: Progressing  2/6/2025 2148 by Casey Andrade RN  Outcome: Progressing     Problem: Safety - Adult  Goal: Free from fall injury  2/7/2025 1140 by Yanique Atkinson RN  Outcome: Progressing  2/6/2025 2148 by Casey Andrade RN  Outcome: Progressing     Problem: Infection - Adult  Goal: Absence of infection at discharge  2/7/2025 1140 by Yanique Atkinson RN  Outcome: Progressing  2/6/2025 2148 by Casey Andrade RN  Outcome: Progressing  Goal: Absence of infection during hospitalization  2/7/2025 1140 by Yanique Atkinson RN  Outcome: Progressing  2/6/2025 2148 by Casey Andrade RN  Outcome: Progressing  Goal: Absence of fever/infection during anticipated neutropenic period  2/7/2025 1140 by Yanique Atkinson RN  Outcome: Progressing  2/6/2025 2148 by Casey Andrade RN  Outcome: Progressing     Problem: Skin/Tissue Integrity - Adult  Goal: Skin integrity remains intact  Description: 1.  Monitor for areas of redness and/or skin breakdown  2.  Assess vascular access sites hourly  3.  Every 4-6 hours minimum:  Change oxygen saturation probe site  4.  Every 4-6 hours:  If on nasal continuous

## 2025-02-08 NOTE — DISCHARGE SUMMARY
[facility] Hospitalist       Hospitalist Physician Discharge Summary       Samuel Oswald MD  [address] [address]  [phone]    Schedule an appointment as soon as possible for a visit  make appointment to discuss options for your hernia    [facility] [place]  [address]  [place] [address]  [phone]          Activity level: as andrew    Diet: ADULT DIET; Regular  ORAL HYDRATION; Electrolyte Solution; 300 ml (10 oz)  ADULT ORAL NUTRITION SUPPLEMENT; Breakfast, Lunch, Dinner; Standard High Calorie/High Protein Oral Supplement    Dispo:snf    Condition at discharge: fair        Patient ID:  Gianna Britt  [id number]  84 y.o.  1940    Admit date: 2/4/2025    Discharge date and time:  2/7/2025  7:01 PM    Admission Diagnoses: Principal Problem:    Pneumonia of both lungs due to infectious organism  Active Problems:    Influenza A    Bacterial pneumonia  Resolved Problems:    * No resolved hospital problems. *      Discharge Diagnoses: Principal Problem:    Pneumonia of both lungs due to infectious organism  Active Problems:    Influenza A    Bacterial pneumonia  Resolved Problems:    * No resolved hospital problems. *    Sepsis  Influenza A infection  Bacterial pneumonia  LLE cellulitis  Parkinsons disease  hyperlipidemia        Consults:  IP CONSULT TO INTERNAL MEDICINE  IP CONSULT TO GENERAL SURGERY    Procedures: none    Hospital Course: Patient was admitted with Cellulitis of left lower leg [L03.116]  Fall, initial encounter [W19.XXXA]  Skin tear of left forearm without complication, initial encounter [S51.812A]  Pneumonia of both lungs due to infectious organism, unspecified part of lung [J18.9]  Community acquired pneumonia, bilateral [J18.9]. Patient is an 84 year old female with past medical history of HLD, HTN, Parkinsons, degenerative joint disease who presents to ER with complaints of fall in bathroom off toilet. She had some skin abrasions to

## 2025-02-09 LAB
MICROORGANISM SPEC CULT: ABNORMAL
MICROORGANISM SPEC CULT: NORMAL
MICROORGANISM SPEC CULT: NORMAL
SERVICE CMNT-IMP: NORMAL
SERVICE CMNT-IMP: NORMAL
SPECIMEN DESCRIPTION: ABNORMAL
SPECIMEN DESCRIPTION: NORMAL
SPECIMEN DESCRIPTION: NORMAL

## 2025-02-26 ENCOUNTER — TELEPHONE (OUTPATIENT)
Dept: PRIMARY CARE CLINIC | Age: 85
End: 2025-02-26

## 2025-02-26 NOTE — TELEPHONE ENCOUNTER
[facility] called to see if you would follow pt. She is being discharged from [facility] tomorrow and will be going to assisted living.

## 2025-02-27 ENCOUNTER — TELEPHONE (OUTPATIENT)
Dept: PRIMARY CARE CLINIC | Age: 85
End: 2025-02-27

## 2025-02-27 NOTE — TELEPHONE ENCOUNTER
[facility] called and They said patient is at their facility now and they  are sending paper work to be filled out and signed and they need all medications sent to their pharmacy [facility]  That number is [phone]  Fax [phone]

## 2025-03-05 ENCOUNTER — TELEPHONE (OUTPATIENT)
Dept: PRIMARY CARE CLINIC | Age: 85
End: 2025-03-05

## 2025-03-05 NOTE — TELEPHONE ENCOUNTER
Received message of patient having a necrotic of breath/warm wound on her lower leg.  Patient should follow-up at walk-in clinic and or making appointment to have site evaluated prior to starting any antibiotics.

## 2025-03-05 NOTE — TELEPHONE ENCOUNTER
[facility] phone  called patient has an necrotic red/ warm wound on her LL leg measuring 5x5cm . She states it was there 4 weeks ago when she was in the hospital. They are requesting an antibiotic be called into their pharmacy at [phone] and then also requesting a referral be placed for   [facility] for wound care since they are just assisted living

## 2025-03-05 NOTE — TELEPHONE ENCOUNTER
Spoke with nurse, she is going to call the daughter to see if she can bring her through walk in today and if not will call back to schedule with you. If she comes through walk in are you wanting them to order the wound care or are you?

## 2025-03-08 PROBLEM — J10.1 INFLUENZA A: Status: RESOLVED | Noted: 2025-02-06 | Resolved: 2025-03-08

## 2025-03-16 ENCOUNTER — APPOINTMENT (OUTPATIENT)
Dept: CT IMAGING | Age: 85
End: 2025-03-16
Payer: MEDICARE

## 2025-03-16 ENCOUNTER — HOSPITAL ENCOUNTER (EMERGENCY)
Age: 85
Discharge: HOME | End: 2025-03-16
Payer: MEDICARE

## 2025-03-16 VITALS
WEIGHT: 122 LBS | TEMPERATURE: 98 F | SYSTOLIC BLOOD PRESSURE: 129 MMHG | HEART RATE: 75 BPM | BODY MASS INDEX: 27.44 KG/M2 | HEIGHT: 56 IN | RESPIRATION RATE: 18 BRPM | OXYGEN SATURATION: 97 % | DIASTOLIC BLOOD PRESSURE: 76 MMHG

## 2025-03-16 DIAGNOSIS — S09.90XA CLOSED HEAD INJURY, INITIAL ENCOUNTER: Primary | ICD-10-CM

## 2025-03-16 DIAGNOSIS — S01.01XA LACERATION OF SCALP, INITIAL ENCOUNTER: ICD-10-CM

## 2025-03-16 PROCEDURE — 12001 RPR S/N/AX/GEN/TRNK 2.5CM/<: CPT

## 2025-03-16 PROCEDURE — 70450 CT HEAD/BRAIN W/O DYE: CPT

## 2025-03-16 PROCEDURE — 99284 EMERGENCY DEPT VISIT MOD MDM: CPT

## 2025-03-16 PROCEDURE — 72125 CT NECK SPINE W/O DYE: CPT

## 2025-03-16 RX ORDER — LIDOCAINE HYDROCHLORIDE AND EPINEPHRINE 10; 10 MG/ML; UG/ML
20 INJECTION, SOLUTION INFILTRATION; PERINEURAL ONCE
Status: DISCONTINUED | OUTPATIENT
Start: 2025-03-16 | End: 2025-03-17 | Stop reason: HOSPADM

## 2025-03-16 ASSESSMENT — LIFESTYLE VARIABLES
HOW MANY STANDARD DRINKS CONTAINING ALCOHOL DO YOU HAVE ON A TYPICAL DAY: PATIENT DOES NOT DRINK
HOW OFTEN DO YOU HAVE A DRINK CONTAINING ALCOHOL: NEVER

## 2025-03-17 NOTE — ED PROVIDER NOTES
repaired with 3 staples. Wound care and staple removal discussed with patient and daughter. Ct head and neck negative for intracranial hemorrhage or fracture.    Counseling:   I spoke with patient about findings and care of wound and staple removal    Assessment      1. Closed head injury, initial encounter    2. Laceration of scalp, initial encounter      Plan   Discharged home.  Patient condition is good    New Medications     New Prescriptions    No medications on file     Electronically signed by Yeimi Estrada PA-C   DD: 3/16/25  **This report was transcribed using voice recognition software. Every effort was made to ensure accuracy; however, inadvertent computerized transcription errors may be present.  END OF ED PROVIDER NOTE

## 2025-03-26 RX ORDER — MIRABEGRON 50 MG/1
TABLET, FILM COATED, EXTENDED RELEASE ORAL
Qty: 30 TABLET | Refills: 11 | OUTPATIENT
Start: 2025-03-26

## 2025-03-26 NOTE — TELEPHONE ENCOUNTER
This medication has a \"historical provider\". Last I see refill was 12/20/21  Last Appointment:  1/12/2024  No future appointments.

## 2025-05-02 ENCOUNTER — APPOINTMENT (OUTPATIENT)
Dept: GENERAL RADIOLOGY | Age: 85
End: 2025-05-02
Payer: MEDICARE

## 2025-05-02 ENCOUNTER — HOSPITAL ENCOUNTER (EMERGENCY)
Age: 85
Discharge: HOME | End: 2025-05-02
Attending: STUDENT IN AN ORGANIZED HEALTH CARE EDUCATION/TRAINING PROGRAM
Payer: MEDICARE

## 2025-05-02 VITALS
TEMPERATURE: 97.5 F | WEIGHT: 110 LBS | SYSTOLIC BLOOD PRESSURE: 141 MMHG | BODY MASS INDEX: 24.75 KG/M2 | OXYGEN SATURATION: 95 % | RESPIRATION RATE: 18 BRPM | HEART RATE: 59 BPM | DIASTOLIC BLOOD PRESSURE: 90 MMHG | HEIGHT: 56 IN

## 2025-05-02 DIAGNOSIS — R55 SYNCOPE, UNSPECIFIED SYNCOPE TYPE: Primary | ICD-10-CM

## 2025-05-02 LAB
ALBUMIN SERPL-MCNC: 3.7 G/DL (ref 3.5–5.2)
ALP SERPL-CCNC: 62 U/L (ref 35–104)
ALT SERPL-CCNC: <5 U/L (ref 0–32)
ANION GAP SERPL CALCULATED.3IONS-SCNC: 14 MMOL/L (ref 7–16)
AST SERPL-CCNC: 15 U/L (ref 0–31)
BASOPHILS # BLD: 0.06 K/UL (ref 0–0.2)
BASOPHILS NFR BLD: 1 % (ref 0–2)
BILIRUB SERPL-MCNC: 0.4 MG/DL (ref 0–1.2)
BILIRUB UR QL STRIP: NEGATIVE
BUN SERPL-MCNC: 22 MG/DL (ref 6–23)
CALCIUM SERPL-MCNC: 8.5 MG/DL (ref 8.6–10.2)
CHLORIDE SERPL-SCNC: 103 MMOL/L (ref 98–107)
CLARITY UR: CLEAR
CO2 SERPL-SCNC: 21 MMOL/L (ref 22–29)
COLOR UR: YELLOW
CREAT SERPL-MCNC: 0.8 MG/DL (ref 0.5–1)
EKG ATRIAL RATE: 63 BPM
EKG P AXIS: 20 DEGREES
EKG P-R INTERVAL: 160 MS
EKG Q-T INTERVAL: 454 MS
EKG QRS DURATION: 86 MS
EKG QTC CALCULATION (BAZETT): 464 MS
EKG R AXIS: 12 DEGREES
EKG T AXIS: 38 DEGREES
EKG VENTRICULAR RATE: 63 BPM
EOSINOPHIL # BLD: 0.18 K/UL (ref 0.05–0.5)
EOSINOPHILS RELATIVE PERCENT: 2 % (ref 0–6)
ERYTHROCYTE [DISTWIDTH] IN BLOOD BY AUTOMATED COUNT: 13.2 % (ref 11.5–15)
GFR, ESTIMATED: 76 ML/MIN/1.73M2
GLUCOSE SERPL-MCNC: 94 MG/DL (ref 74–99)
GLUCOSE UR STRIP-MCNC: NEGATIVE MG/DL
HCT VFR BLD AUTO: 40 % (ref 34–48)
HGB BLD-MCNC: 12.9 G/DL (ref 11.5–15.5)
HGB UR QL STRIP.AUTO: NEGATIVE
IMM GRANULOCYTES # BLD AUTO: 0.03 K/UL (ref 0–0.58)
IMM GRANULOCYTES NFR BLD: 0 % (ref 0–5)
KETONES UR STRIP-MCNC: NEGATIVE MG/DL
LEUKOCYTE ESTERASE UR QL STRIP: NEGATIVE
LYMPHOCYTES NFR BLD: 0.91 K/UL (ref 1.5–4)
LYMPHOCYTES RELATIVE PERCENT: 10 % (ref 20–42)
MAGNESIUM SERPL-MCNC: 2 MG/DL (ref 1.6–2.6)
MCH RBC QN AUTO: 31.8 PG (ref 26–35)
MCHC RBC AUTO-ENTMCNC: 32.3 G/DL (ref 32–34.5)
MCV RBC AUTO: 98.5 FL (ref 80–99.9)
MONOCYTES NFR BLD: 0.86 K/UL (ref 0.1–0.95)
MONOCYTES NFR BLD: 10 % (ref 2–12)
NEUTROPHILS NFR BLD: 78 % (ref 43–80)
NEUTS SEG NFR BLD: 7.03 K/UL (ref 1.8–7.3)
NITRITE UR QL STRIP: NEGATIVE
PH UR STRIP: 5.5 [PH] (ref 5–8)
PLATELET # BLD AUTO: 261 K/UL (ref 130–450)
PMV BLD AUTO: 9.7 FL (ref 7–12)
POTASSIUM SERPL-SCNC: 4.1 MMOL/L (ref 3.5–5)
PROT SERPL-MCNC: 6.2 G/DL (ref 6.4–8.3)
PROT UR STRIP-MCNC: NEGATIVE MG/DL
RBC # BLD AUTO: 4.06 M/UL (ref 3.5–5.5)
RBC #/AREA URNS HPF: NORMAL /HPF
SARS-COV-2 RDRP RESP QL NAA+PROBE: NOT DETECTED
SODIUM SERPL-SCNC: 138 MMOL/L (ref 132–146)
SP GR UR STRIP: 1.01 (ref 1–1.03)
SPECIMEN DESCRIPTION: NORMAL
TROPONIN I SERPL HS-MCNC: 17 NG/L (ref 0–14)
UROBILINOGEN UR STRIP-ACNC: 0.2 EU/DL (ref 0–1)
WBC #/AREA URNS HPF: NORMAL /HPF
WBC OTHER # BLD: 9.1 K/UL (ref 4.5–11.5)

## 2025-05-02 PROCEDURE — 99285 EMERGENCY DEPT VISIT HI MDM: CPT

## 2025-05-02 PROCEDURE — 81001 URINALYSIS AUTO W/SCOPE: CPT

## 2025-05-02 PROCEDURE — 83735 ASSAY OF MAGNESIUM: CPT

## 2025-05-02 PROCEDURE — 71046 X-RAY EXAM CHEST 2 VIEWS: CPT

## 2025-05-02 PROCEDURE — 93010 ELECTROCARDIOGRAM REPORT: CPT | Performed by: INTERNAL MEDICINE

## 2025-05-02 PROCEDURE — 84484 ASSAY OF TROPONIN QUANT: CPT

## 2025-05-02 PROCEDURE — 93005 ELECTROCARDIOGRAM TRACING: CPT

## 2025-05-02 PROCEDURE — 87635 SARS-COV-2 COVID-19 AMP PRB: CPT

## 2025-05-02 PROCEDURE — 80053 COMPREHEN METABOLIC PANEL: CPT

## 2025-05-02 PROCEDURE — 85025 COMPLETE CBC W/AUTO DIFF WBC: CPT

## 2025-05-02 RX ORDER — 0.9 % SODIUM CHLORIDE 0.9 %
1000 INTRAVENOUS SOLUTION INTRAVENOUS ONCE
Status: DISCONTINUED | OUTPATIENT
Start: 2025-05-02 | End: 2025-05-02

## 2025-05-02 ASSESSMENT — PAIN - FUNCTIONAL ASSESSMENT: PAIN_FUNCTIONAL_ASSESSMENT: NONE - DENIES PAIN

## 2025-05-02 NOTE — ED PROVIDER NOTES
[facility] EMERGENCY DEPARTMENT  EMERGENCY DEPARTMENT ENCOUNTER        Pt Name: Gianna Britt  MRN: [id number]  Birthdate 1940  Date of evaluation: 5/2/2025  Provider: Concepcion Braden MD  PCP: Kareem Moses MD  Note Started: 4:17 PM EDT 5/2/25    CHIEF COMPLAINT       Chief Complaint   Patient presents with    Hypotension       HISTORY OF PRESENT ILLNESS: 1 or more Elements   History From: Patient    Limitations to history : None    Gianna Britt is a 84 y.o. female who presents via EMS from nursing facility for syncopal episode.  Patient states she ate lunch and when she came back to her room she had a short episode of syncope while sitting in her wheelchair.  She describes \"blacking out.\"  No fall.  No head injury.  Patient was believed to have lost consciousness for about 30 seconds.  Patient had been feeling well otherwise.  She states she has been eating and drinking well. Patient has no complaints at this time.  She states she feels fine.  Daughter at bedside notes that this has happened a couple times in the past and is usually related to dehydration.  Patient is DNR CC.    Patient denies fever, chills, headache, shortness of breath, chest pain, abdominal pain, nausea, vomiting, diarrhea, lightheadedness, dysuria, hematuria, hematochezia, and melena.    Nursing Notes were all reviewed and agreed with or any disagreements were addressed in the HPI.        REVIEW OF EXTERNAL NOTES :         REVIEW OF SYSTEMS :      Positives and Pertinent negatives as per HPI.     SURGICAL HISTORY     Past Surgical History:   Procedure Laterality Date    KNEE SURGERY  left    TONSILLECTOMY AND ADENOIDECTOMY         CURRENTMEDICATIONS       Discharge Medication List as of 5/2/2025  4:37 PM        CONTINUE these medications which have NOT CHANGED    Details   carbidopa-levodopa (SINEMET)  MG per tablet 1 tablet 4 times daily @ 8 am  @ 12 pm  @ 4 pm  @ 8 pmHistorical Med       rosuvastatin (CRESTOR) 20 MG tablet Take 1 tablet by mouth nightly, Disp-90 tablet, R-1**Patient requests 90 days supply**Normal      Handicap Placard [facility] Starting Tue 10/24/2023, Disp-1 each, R-0, Print5 years      mirabegron (MYRBETRIQ) 25 MG TB24 Take 2 tablets by mouth dailyHistorical Med      carbidopa-levodopa (SINEMET)  MG per tablet Take 1 tablet by mouth at bedtime @11pm, R-0Historical Med      ropinirole (REQUIP) 1 MG tablet Take 8 tablets by mouth every morning (before breakfast)Historical Med      nortriptyline (PAMELOR) 10 MG capsule Take 1 capsule by mouth nightlyHistorical Med             ALLERGIES     Patient has no known allergies.    FAMILYHISTORY       Family History   Problem Relation Age of Onset    No Known Problems Mother     No Known Problems Father         SOCIAL HISTORY       Social History     Tobacco Use    Smoking status: Never    Smokeless tobacco: Never   Vaping Use    Vaping status: Never Used   Substance Use Topics    Alcohol use: Not Currently    Drug use: No       SCREENINGS                         CIWA Assessment  BP: (!) 141/90  Pulse: 59           PHYSICAL EXAM  1 or more Elements     ED Triage Vitals [05/02/25 1332]   BP Systolic BP Percentile Diastolic BP Percentile Temp Temp Source Pulse Respirations SpO2   96/61 -- -- 97.5 °F (36.4 °C) Oral 59 18 95 %      Height Weight - Scale         1.422 m (4' 8\") 49.9 kg (110 lb)                  Constitutional/General: Alert and oriented x3, thin appearing, in no acute distress  Head: Normocephalic and atraumatic  Eyes: PERRL, EOMI, conjunctiva normal  ENT:  Oropharynx clear, handling secretions, dry mucous membranes  Neck: Supple, full ROM, no stridor, no meningeal signs  Respiratory: Lungs clear to auscultation bilaterally, no wheezes, rales, or rhonchi. Not in respiratory distress  Cardiovascular:  Regular rate. Regular rhythm. Equal extremity pulses.   GI:  Abdomen Soft, Non tender, Non distended. No rebound, guarding, or